# Patient Record
Sex: MALE | Race: BLACK OR AFRICAN AMERICAN | Employment: FULL TIME | URBAN - METROPOLITAN AREA
[De-identification: names, ages, dates, MRNs, and addresses within clinical notes are randomized per-mention and may not be internally consistent; named-entity substitution may affect disease eponyms.]

---

## 2017-02-05 ENCOUNTER — HOSPITAL ENCOUNTER (OUTPATIENT)
Dept: LAB | Age: 67
Discharge: HOME | End: 2017-02-05
Payer: COMMERCIAL

## 2017-02-05 LAB
25(OH)D3 SERPL-MCNC: 28.3 NG/ML (ref 30–100)
ALBUMIN SERPL BCP-MCNC: 4.1 G/DL (ref 3.4–5)
ALBUMIN/GLOB SERPL: 1.3 {RATIO} (ref 0.8–1.7)
ALP SERPL-CCNC: 76 U/L (ref 45–117)
ALT SERPL-CCNC: 25 U/L (ref 16–61)
ANION GAP BLD CALC-SCNC: 10 MMOL/L (ref 3–18)
AST SERPL W P-5'-P-CCNC: 15 U/L (ref 15–37)
BASOPHILS # BLD AUTO: 0 K/UL (ref 0–0.06)
BASOPHILS # BLD: 0 % (ref 0–2)
BILIRUB DIRECT SERPL-MCNC: 0.2 MG/DL (ref 0–0.2)
BILIRUB SERPL-MCNC: 0.6 MG/DL (ref 0.2–1)
BUN SERPL-MCNC: 18 MG/DL (ref 7–18)
BUN/CREAT SERPL: 17 (ref 12–20)
CALCIUM SERPL-MCNC: 9 MG/DL (ref 8.5–10.1)
CHLORIDE SERPL-SCNC: 109 MMOL/L (ref 100–108)
CHOLEST SERPL-MCNC: 104 MG/DL
CO2 SERPL-SCNC: 25 MMOL/L (ref 21–32)
CREAT SERPL-MCNC: 1.08 MG/DL (ref 0.6–1.3)
CREAT UR-MCNC: 233 MG/DL (ref 30–125)
DIFFERENTIAL METHOD BLD: ABNORMAL
EOSINOPHIL # BLD: 0.1 K/UL (ref 0–0.4)
EOSINOPHIL NFR BLD: 2 % (ref 0–5)
ERYTHROCYTE [DISTWIDTH] IN BLOOD BY AUTOMATED COUNT: 13.1 % (ref 11.6–14.5)
GLOBULIN SER CALC-MCNC: 3.1 G/DL (ref 2–4)
GLUCOSE SERPL-MCNC: 102 MG/DL (ref 74–99)
HBA1C MFR BLD: 6.3 % (ref 4.2–5.6)
HCT VFR BLD AUTO: 44.2 % (ref 36–48)
HDLC SERPL-MCNC: 34 MG/DL (ref 40–60)
HDLC SERPL: 3.1 {RATIO} (ref 0–5)
HGB BLD-MCNC: 15.1 G/DL (ref 13–16)
LDLC SERPL CALC-MCNC: 48 MG/DL (ref 0–100)
LIPID PROFILE,FLP: ABNORMAL
LYMPHOCYTES # BLD AUTO: 55 % (ref 21–52)
LYMPHOCYTES # BLD: 3 K/UL (ref 0.9–3.6)
MCH RBC QN AUTO: 31.7 PG (ref 24–34)
MCHC RBC AUTO-ENTMCNC: 34.2 G/DL (ref 31–37)
MCV RBC AUTO: 92.9 FL (ref 74–97)
MICROALBUMIN UR-MCNC: 1.2 MG/DL (ref 0–3)
MICROALBUMIN/CREAT UR-RTO: 5 MG/G (ref 0–30)
MONOCYTES # BLD: 0.4 K/UL (ref 0.05–1.2)
MONOCYTES NFR BLD AUTO: 6 % (ref 3–10)
NEUTS SEG # BLD: 2 K/UL (ref 1.8–8)
NEUTS SEG NFR BLD AUTO: 37 % (ref 40–73)
PLATELET # BLD AUTO: 122 K/UL (ref 135–420)
PMV BLD AUTO: 10.9 FL (ref 9.2–11.8)
POTASSIUM SERPL-SCNC: 4 MMOL/L (ref 3.5–5.5)
PROT SERPL-MCNC: 7.2 G/DL (ref 6.4–8.2)
RBC # BLD AUTO: 4.76 M/UL (ref 4.7–5.5)
SODIUM SERPL-SCNC: 144 MMOL/L (ref 136–145)
T4 FREE SERPL-MCNC: 1 NG/DL (ref 0.7–1.5)
TRIGL SERPL-MCNC: 110 MG/DL (ref ?–150)
TSH SERPL DL<=0.05 MIU/L-ACNC: 1.17 UIU/ML (ref 0.36–3.74)
VLDLC SERPL CALC-MCNC: 22 MG/DL
WBC # BLD AUTO: 5.5 K/UL (ref 4.6–13.2)

## 2017-02-05 PROCEDURE — 82306 VITAMIN D 25 HYDROXY: CPT | Performed by: INTERNAL MEDICINE

## 2017-02-05 PROCEDURE — 82043 UR ALBUMIN QUANTITATIVE: CPT | Performed by: INTERNAL MEDICINE

## 2017-02-05 PROCEDURE — 80061 LIPID PANEL: CPT | Performed by: INTERNAL MEDICINE

## 2017-02-05 PROCEDURE — 84439 ASSAY OF FREE THYROXINE: CPT | Performed by: INTERNAL MEDICINE

## 2017-02-05 PROCEDURE — 36415 COLL VENOUS BLD VENIPUNCTURE: CPT | Performed by: INTERNAL MEDICINE

## 2017-02-05 PROCEDURE — 85025 COMPLETE CBC W/AUTO DIFF WBC: CPT | Performed by: INTERNAL MEDICINE

## 2017-02-05 PROCEDURE — 83036 HEMOGLOBIN GLYCOSYLATED A1C: CPT | Performed by: INTERNAL MEDICINE

## 2017-02-05 PROCEDURE — 80048 BASIC METABOLIC PNL TOTAL CA: CPT | Performed by: INTERNAL MEDICINE

## 2017-02-05 PROCEDURE — 80076 HEPATIC FUNCTION PANEL: CPT | Performed by: INTERNAL MEDICINE

## 2017-02-05 PROCEDURE — 84443 ASSAY THYROID STIM HORMONE: CPT | Performed by: INTERNAL MEDICINE

## 2017-02-08 ENCOUNTER — OFFICE VISIT (OUTPATIENT)
Dept: FAMILY MEDICINE CLINIC | Age: 67
End: 2017-02-08

## 2017-02-08 VITALS
BODY MASS INDEX: 29.85 KG/M2 | OXYGEN SATURATION: 99 % | DIASTOLIC BLOOD PRESSURE: 76 MMHG | HEART RATE: 74 BPM | HEIGHT: 65 IN | SYSTOLIC BLOOD PRESSURE: 130 MMHG | RESPIRATION RATE: 20 BRPM | WEIGHT: 179.2 LBS | TEMPERATURE: 98.1 F

## 2017-02-08 DIAGNOSIS — Z12.5 PROSTATE CANCER SCREENING: ICD-10-CM

## 2017-02-08 DIAGNOSIS — E11.9 TYPE 2 DIABETES MELLITUS WITHOUT COMPLICATION, WITHOUT LONG-TERM CURRENT USE OF INSULIN (HCC): ICD-10-CM

## 2017-02-08 DIAGNOSIS — E78.00 PURE HYPERCHOLESTEROLEMIA: ICD-10-CM

## 2017-02-08 DIAGNOSIS — E55.9 VITAMIN D DEFICIENCY: ICD-10-CM

## 2017-02-08 DIAGNOSIS — Z00.00 ANNUAL PHYSICAL EXAM: Primary | ICD-10-CM

## 2017-02-08 NOTE — PROGRESS NOTES
SUBJECTIVE:   Cecilia Gonzalez is a 61 y.o. male presenting for his annual checkup. Still has a click in the LT ear that he gets when he has to pull on the ear lobe. Has an occasional \"soreness\" but follows it with \"it is really not that bad\", along the center on the RT sole of foot. Does not affect his walking or ability to do anything. Past Medical History   Diagnosis Date    CAD (coronary artery disease)     Constipation     Diabetes mellitus ([address] 8/7/2015    Diarrhea     Gastric ulcer 12/16/2014     On EGD in 2014.  Hiatal hernia 12/16/2014     On EGD, 2014. With Dr. Massiel Mascorro.  History of Helicobacter pylori infection 12/16/2014     On EGD in 2014, Dr. Massiel Mascorro. S/p Tx.  Hyperlipidemia     Impotence of organic origin     Lactose intolerance     Low serum testosterone level     Nocturia     Other testicular hypofunction     Sinus congestion     Stented coronary artery 09/2013.  Vitamin D deficiency        Allergies: Amaryl [glimepiride]     Current Outpatient Prescriptions   Medication Sig    simvastatin (ZOCOR) 20 mg tablet take 1 tablet by mouth NIGHTLY    ONETOUCH ULTRA TEST strip TEST once daily    metFORMIN (GLUCOPHAGE) 500 mg tablet take 1 tablet by mouth twice a day with meals    ergocalciferol (ERGOCALCIFEROL) 50,000 unit capsule     Lancets (ONETOUCH ULTRASOFT LANCETS) misc For once daily testing    nitroglycerin (NITROSTAT) 0.4 mg SL tablet by SubLINGual route every five (5) minutes as needed for Chest Pain.  aspirin 81 mg tablet Take 81 mg by mouth daily.  sildenafil citrate (VIAGRA) 100 mg tablet Take 1 Tab by mouth daily as needed for up to 4 doses. No current facility-administered medications for this visit. ROS:  Feeling well. No dyspnea or chest pain on exertion. No abdominal pain, change in bowel habits, black or bloody stools. No urinary tract or prostatic symptoms. No neurological complaints.       OBJECTIVE:   The patient appears well, alert, oriented x 3, in no distress. Visit Vitals    /76 (BP 1 Location: Left arm, BP Patient Position: Sitting)    Pulse 74    Temp 98.1 °F (36.7 °C) (Oral)    Resp 20    Ht 5' 5\" (1.651 m)    Wt 179 lb 3.2 oz (81.3 kg)    SpO2 99%    BMI 29.82 kg/m2       General appearance: alert, cooperative, no distress, appears stated age  Head: Normocephalic, without obvious abnormality, atraumatic  Ears: normal TM's and external ear canals AU  Throat: Lips, mucosa, and tongue normal. Teeth and gums normal  Neck: supple, symmetrical, trachea midline, no adenopathy, thyroid: not enlarged, symmetric, no tenderness/mass/nodules, no carotid bruit and no JVD  Back: symmetric, no curvature. ROM normal. No CVA tenderness. Lungs: clear to auscultation bilaterally  Heart: regular rate and rhythm, S1, S2 normal, no murmur, click, rub or gallop  Abdomen: soft, non-tender. Bowel sounds normal. No masses,  no organomegaly  Extremities: extremities normal, atraumatic, no cyanosis or edema  Pulses: 2+ and symmetric  Skin: Skin color, texture, turgor normal. No rashes or lesions  Neurological is normal, no focal findings.     Diabetic foot exam:     Left:    Filament test normal sensation with micro filament   Pulse DP: 2+ (normal)   Pulse PT: 2+ (normal)   Deformities: None  Right:    Filament test normal sensation with micro filament   Pulse DP: 2+ (normal)   Pulse PT: 2+ (normal)   Deformities: None        Lab Results   Component Value Date/Time    WBC 5.5 02/05/2017 08:37 AM    HGB 15.1 02/05/2017 08:37 AM    HCT 44.2 02/05/2017 08:37 AM    PLATELET 972 94/32/4886 08:37 AM    MCV 92.9 02/05/2017 08:37 AM         Lab Results   Component Value Date/Time    Sodium 144 02/05/2017 08:37 AM    Potassium 4.0 02/05/2017 08:37 AM    Chloride 109 02/05/2017 08:37 AM    CO2 25 02/05/2017 08:37 AM    Anion gap 10 02/05/2017 08:37 AM    Glucose 102 02/05/2017 08:37 AM    BUN 18 02/05/2017 08:37 AM    Creatinine 1.08 02/05/2017 08:37 AM    BUN/Creatinine ratio 17 02/05/2017 08:37 AM    GFR est AA >60 02/05/2017 08:37 AM    GFR est non-AA >60 02/05/2017 08:37 AM    Calcium 9.0 02/05/2017 08:37 AM         Lab Results   Component Value Date/Time    ALT (SGPT) 25 02/05/2017 08:37 AM    AST (SGOT) 15 02/05/2017 08:37 AM    Alk. phosphatase 76 02/05/2017 08:37 AM    Bilirubin, direct 0.2 02/05/2017 08:37 AM    Bilirubin, total 0.6 02/05/2017 08:37 AM         Lab Results   Component Value Date/Time    Cholesterol, total 104 02/05/2017 08:37 AM    HDL Cholesterol 34 02/05/2017 08:37 AM    LDL, calculated 48 02/05/2017 08:37 AM    VLDL, calculated 22 02/05/2017 08:37 AM    Triglyceride 110 02/05/2017 08:37 AM    CHOL/HDL Ratio 3.1 02/05/2017 08:37 AM         Lab Results   Component Value Date/Time    TSH 1.17 02/05/2017 08:37 AM    T4, Free 1.0 02/05/2017 08:37 AM         Lab Results   Component Value Date/Time    Vitamin D 25-Hydroxy 28.3 02/05/2017 08:37 AM    VITAMIN D, 25-HYDROXY 31.0 12/06/2014 09:15 AM           ASSESSMENT:   healthy adult male    Natalio Hayden was seen today for complete physical.    Diagnoses and all orders for this visit:    Annual physical exam    Prostate cancer screening  -     PROSTATE SPECIFIC AG; Future    Type 2 diabetes mellitus without complication, without long-term current use of insulin ([facility] [address]  -     REFERRAL TO OPHTHALMOLOGY  -      DIABETES FOOT EXAM    Pure hypercholesterolemia    Vitamin D deficiency          PLAN:   follow a low fat, low cholesterol diet. Will f/u in 4 mon for DM. The plan was discussed with the patient. The patient verbalized understanding and is in agreement with the plan. All medication potential side effects were discussed with the patient.

## 2017-02-08 NOTE — PATIENT INSTRUCTIONS

## 2017-02-08 NOTE — PROGRESS NOTES
Jaciel Ovalle is a 61 y.o. male  Chief Complaint   Patient presents with    Complete Physical     1. Have you been to the ER, urgent care clinic since your last visit? Hospitalized since your last visit? No    2. Have you seen or consulted any other health care providers outside of the [address] [place] [address] since your last visit? Include any pap smears or colon screening.  No

## 2017-02-08 NOTE — MR AVS SNAPSHOT
Visit Information Date & Time Provider Department Dept. Phone Encounter #  
 2/8/2017  3:00 PM Thalia Xavier, 3 [facility] [phone] [id number] Upcoming Health Maintenance Date Due Hepatitis C Screening 1950 EYE EXAM RETINAL OR DILATED Q1 8/17/2016 FOOT EXAM Q1 2/1/2017 HEMOGLOBIN A1C Q6M 8/5/2017 MICROALBUMIN Q1 2/5/2018 LIPID PANEL Q1 2/5/2018 COLONOSCOPY 1/1/2022 DTaP/Tdap/Td series (2 - Td) 12/16/2024 Allergies as of 2/8/2017  Review Complete On: 2/8/2017 By: Margo Newberry LPN Severity Noted Reaction Type Reactions Amaryl [Glimepiride]  08/11/2015   Side Effect Vertigo Patient reported that this medications made him very dizzy and light headed. It was discontinued by Dr Stanley Guillen Current Immunizations  Reviewed on 2/8/2017 Name Date Tdap 12/16/2014 Reviewed by Thalia Xavier MD on 2/8/2017 at  3:35 PM  
You Were Diagnosed With   
  
 Codes Comments Annual physical exam    -  Primary ICD-10-CM: Z00.00 ICD-9-CM: V70.0 Prostate cancer screening     ICD-10-CM: Z12.5 ICD-9-CM: V76.44 Type 2 diabetes mellitus without complication, without long-term current use of insulin (HCC)     ICD-10-CM: E11.9 ICD-9-CM: 250.00 Pure hypercholesterolemia     ICD-10-CM: E78.00 ICD-9-CM: 272.0 Vitamin D deficiency     ICD-10-CM: E55.9 ICD-9-CM: 268.9 Vitals BP Pulse Temp Resp Height(growth percentile) Weight(growth percentile) 130/76 (BP 1 Location: Left arm, BP Patient Position: Sitting) 74 98.1 °F (36.7 °C) (Oral) 20 5' 5\" (1.651 m) 179 lb 3.2 oz (81.3 kg) SpO2 BMI Smoking Status 99% 29.82 kg/m2 Former Smoker Vitals History BMI and BSA Data Body Mass Index Body Surface Area  
 29.82 kg/m 2 1.93 m 2 Preferred Pharmacy Pharmacy Name Phone [facility] [address] [phone] Your Updated Medication List  
  
   
This list is accurate as of: 2/8/17  3:51 PM.  Always use your most recent med list.  
  
  
  
  
 aspirin 81 mg tablet Take 81 mg by mouth daily. ergocalciferol 50,000 unit capsule Commonly known as:  ERGOCALCIFEROL Lancets Misc Commonly known as:  ONETOUCH ULTRASOFT LANCETS For once daily testing  
  
 metFORMIN 500 mg tablet Commonly known as:  GLUCOPHAGE  
take 1 tablet by mouth twice a day with meals NITROSTAT 0.4 mg SL tablet Generic drug:  nitroglycerin  
by SubLINGual route every five (5) minutes as needed for Chest Pain. ONETOUCH ULTRA TEST strip Generic drug:  glucose blood VI test strips TEST once daily  
  
 sildenafil citrate 100 mg tablet Commonly known as:  VIAGRA Take 1 Tab by mouth daily as needed for up to 4 doses. simvastatin 20 mg tablet Commonly known as:  ZOCOR  
take 1 tablet by mouth NIGHTLY We Performed the Following  DIABETES FOOT EXAM [HM7 Custom] REFERRAL TO OPHTHALMOLOGY [REF57 Custom] Comments:  
 DO NOT SCHEDULE  Get note from Dr. Filiberto Grove To-Do List   
 02/08/2017 Lab:  [facility] (PROSTATIC SPECIFIC AG) Referral Information Referral ID Referred By Referred To  
  
 [id number] Alexia eHrrera Not Available Visits Status Start Date End Date 1 New Request 2/8/17 2/8/18 If your referral has a status of pending review or denied, additional information will be sent to support the outcome of this decision. Patient Instructions Well Visit, Men 48 to 72: Care Instructions Your Care Instructions Physical exams can help you stay healthy. Your doctor has checked your overall health and may have suggested ways to take good care of yourself. He or she also may have recommended tests. At home, you can help prevent illness with healthy eating, regular exercise, and other steps. Follow-up care is a key part of your treatment and safety. Be sure to make and go to all appointments, and call your doctor if you are having problems. It's also a good idea to know your test results and keep a list of the medicines you take. How can you care for yourself at home? · Reach and stay at a healthy weight. This will lower your risk for many problems, such as obesity, diabetes, heart disease, and high blood pressure. · Get at least 30 minutes of exercise on most days of the week. Walking is a good choice. You also may want to do other activities, such as running, swimming, cycling, or playing tennis or team sports. · Do not smoke. Smoking can make health problems worse. If you need help quitting, talk to your doctor about stop-smoking programs and medicines. These can increase your chances of quitting for good. · Protect your skin from too much sun. When you're outdoors from 10 a.m. to 4 p.m., stay in the shade or cover up with clothing and a hat with a wide brim. Wear sunglasses that block UV rays. Even when it's cloudy, put broad-spectrum sunscreen (SPF 30 or higher) on any exposed skin. · See a dentist one or two times a year for checkups and to have your teeth cleaned. · Wear a seat belt in the car. · Limit alcohol to 2 drinks a day. Too much alcohol can cause health problems. Follow your doctor's advice about when to have certain tests. These tests can spot problems early. · Cholesterol. Your doctor will tell you how often to have this done based on your overall health and other things that can increase your risk for heart attack and stroke. · Blood pressure. Have your blood pressure checked during a routine doctor visit. Your doctor will tell you how often to check your blood pressure based on your age, your blood pressure results, and other factors.  
· Prostate exam. Talk to your doctor about whether you should have a blood test (called a PSA test) for prostate cancer. Experts disagree on whether men should have this test. Some experts recommend that you discuss the benefits and risks of the test with your doctor. · Diabetes. Ask your doctor whether you should have tests for diabetes. · Vision. Some experts recommend that you have yearly exams for glaucoma and other age-related eye problems starting at age 48. · Hearing. Tell your doctor if you notice any change in your hearing. You can have tests to find out how well you hear. · Colon cancer. You should begin tests for colon cancer at age 48. You may have one of several tests. Your doctor will tell you how often to have tests based on your age and risk. Risks include whether you already had a precancerous polyp removed from your colon or whether your parent, brother, sister, or child has had colon cancer. · Heart attack and stroke risk. At least every 4 to 6 years, you should have your risk for heart attack and stroke assessed. Your doctor uses factors such as your age, blood pressure, cholesterol, and whether you smoke or have diabetes to show what your risk for a heart attack or stroke is over the next 10 years. · Abdominal aortic aneurysm. Ask your doctor whether you should have a test to check for an aneurysm. You may need a test if you ever smoked or if your parent, brother, sister, or child has had an aneurysm. When should you call for help? Watch closely for changes in your health, and be sure to contact your doctor if you have any problems or symptoms that concern you. Where can you learn more? Go to http://indio-kavita.info/. Enter G897 in the search box to learn more about \"Well Visit, Men 48 to 72: Care Instructions. \" Current as of: July 19, 2016 Content Version: 11.1 © 5888-2383 [employer].  Care instructions adapted under license by [employer] (which disclaims liability or warranty for this information). If you have questions about a medical condition or this instruction, always ask your healthcare professional. [address] any warranty or liability for your use of this information. Introducing \[facility] [place] [facility]\"" & HEALTH SERVICES! Dear Bruce Vanegas: Thank you for requesting a [employer] account. Our records indicate that you already have an active [employer] account. You can access your account anytime at [email or web address] Did you know that you can access your hospital and ER discharge instructions at any time in In[employer]ient? You can also review all of your test results from your hospital stay or ER visit. Additional Information If you have questions, please visit the Frequently Asked Questions section of the In[employer] website at [email or web address] Remember, [employer] is NOT to be used for urgent needs. For medical emergencies, dial 911. Now available from your iPhone and Android! Please provide this summary of care documentation to your next provider. Your primary care clinician is listed as [address]. If you have any questions after today's visit, please call [phone].

## 2017-03-30 ENCOUNTER — OFFICE VISIT (OUTPATIENT)
Dept: FAMILY MEDICINE CLINIC | Age: 67
End: 2017-03-30

## 2017-03-30 ENCOUNTER — HOSPITAL ENCOUNTER (OUTPATIENT)
Dept: LAB | Age: 67
Discharge: HOME | End: 2017-03-30
Payer: COMMERCIAL

## 2017-03-30 VITALS
HEIGHT: 65 IN | DIASTOLIC BLOOD PRESSURE: 82 MMHG | BODY MASS INDEX: 29.99 KG/M2 | RESPIRATION RATE: 18 BRPM | HEART RATE: 73 BPM | OXYGEN SATURATION: 97 % | TEMPERATURE: 97.7 F | WEIGHT: 180 LBS | SYSTOLIC BLOOD PRESSURE: 128 MMHG

## 2017-03-30 DIAGNOSIS — J06.9 UPPER RESPIRATORY TRACT INFECTION, UNSPECIFIED TYPE: Primary | ICD-10-CM

## 2017-03-30 DIAGNOSIS — J10.1 INFLUENZA B: ICD-10-CM

## 2017-03-30 DIAGNOSIS — J02.9 SORE THROAT: ICD-10-CM

## 2017-03-30 LAB
FLUAV+FLUBV AG NOSE QL IA.RAPID: NEGATIVE POS/NEG
FLUAV+FLUBV AG NOSE QL IA.RAPID: POSITIVE POS/NEG
PSA SERPL-MCNC: 0.3 NG/ML (ref 0–4)
VALID INTERNAL CONTROL?: YES

## 2017-03-30 PROCEDURE — 84153 ASSAY OF PSA TOTAL: CPT | Performed by: INTERNAL MEDICINE

## 2017-03-30 PROCEDURE — 36415 COLL VENOUS BLD VENIPUNCTURE: CPT | Performed by: INTERNAL MEDICINE

## 2017-03-30 RX ORDER — OSELTAMIVIR PHOSPHATE 75 MG/1
75 CAPSULE ORAL 2 TIMES DAILY
Qty: 10 CAP | Refills: 0 | Status: SHIPPED | OUTPATIENT
Start: 2017-03-30 | End: 2017-04-04

## 2017-03-30 NOTE — PATIENT INSTRUCTIONS
Influenza (Flu): Care Instructions  Your Care Instructions  Influenza (flu) is an infection in the lungs and breathing passages. It is caused by the influenza virus. There are different strains, or types, of the flu virus from year to year. Unlike the common cold, the flu comes on suddenly and the symptoms, such as a cough, congestion, fever, chills, fatigue, aches, and pains, are more severe. These symptoms may last up to 10 days. Although the flu can make you feel very sick, it usually doesn't cause serious health problems. Home treatment is usually all you need for flu symptoms. But your doctor may prescribe antiviral medicine to prevent other health problems, such as pneumonia, from developing. Older people and those who have a long-term health condition, such as lung disease, are most at risk for having pneumonia or other health problems. Follow-up care is a key part of your treatment and safety. Be sure to make and go to all appointments, and call your doctor if you are having problems. Its also a good idea to know your test results and keep a list of the medicines you take. How can you care for yourself at home? · Get plenty of rest.  · Drink plenty of fluids, enough so that your urine is light yellow or clear like water. If you have kidney, heart, or liver disease and have to limit fluids, talk with your doctor before you increase the amount of fluids you drink. · Take an over-the-counter pain medicine if needed, such as acetaminophen (Tylenol), ibuprofen (Advil, Motrin), or naproxen (Aleve), to relieve fever, headache, and muscle aches. Read and follow all instructions on the label. No one younger than 20 should take aspirin. It has been linked to Reye syndrome, a serious illness. · Do not smoke. Smoking can make the flu worse. If you need help quitting, talk to your doctor about stop-smoking programs and medicines. These can increase your chances of quitting for good.   · Breathe moist air from a hot shower or from a sink filled with hot water to help clear a stuffy nose. · Before you use cough and cold medicines, check the label. These medicines may not be safe for young children or for people with certain health problems. · If the skin around your nose and lips becomes sore, put some petroleum jelly on the area. · To ease coughing:  ¨ Drink fluids to soothe a scratchy throat. ¨ Suck on cough drops or plain hard candy. ¨ Take an over-the-counter cough medicine that contains dextromethorphan to help you get some sleep. Read and follow all instructions on the label. ¨ Raise your head at night with an extra pillow. This may help you rest if coughing keeps you awake. · Take any prescribed medicine exactly as directed. Call your doctor if you think you are having a problem with your medicine. To avoid spreading the flu  · Wash your hands regularly, and keep your hands away from your face. · Stay home from school, work, and other public places until you are feeling better and your fever has been gone for at least 24 hours. The fever needs to have gone away on its own without the help of medicine. · Ask people living with you to talk to their doctors about preventing the flu. They may get antiviral medicine to keep from getting the flu from you. · To prevent the flu in the future, get a flu vaccine every fall. Encourage people living with you to get the vaccine. · Cover your mouth when you cough or sneeze. When should you call for help? Call 911 anytime you think you may need emergency care. For example, call if:  · You have severe trouble breathing. Call your doctor now or seek immediate medical care if:  · You have new or worse trouble breathing. · You seem to be getting much sicker. · You feel very sleepy or confused. · You have a new or higher fever. · You get a new rash.   Watch closely for changes in your health, and be sure to contact your doctor if:  · You begin to get better and then get worse. · You are not getting better after 1 week. Where can you learn more? Go to http://indio-kavita.info/. Enter W509 in the search box to learn more about \"Influenza (Flu): Care Instructions. \"  Current as of: May 23, 2016  Content Version: 11.2  © 7100-1197 [employer]. Care instructions adapted under license by [employer] (which disclaims liability or warranty for this information). If you have questions about a medical condition or this instruction, always ask your healthcare professional. [address] any warranty or liability for your use of this information.

## 2017-03-30 NOTE — PROGRESS NOTES
Paula Calderon is a 61 y.o. male here today with complaints of cough and congestion        1. Have you been to the ER, urgent care clinic since your last visit? Hospitalized since your last visit? Yes Reason for visit: patient first 3/23/17 cold    2. Have you seen or consulted any other health care providers outside of the [address] [place] [address] since your last visit? Include any pap smears or colon screening.  No

## 2017-03-30 NOTE — PROGRESS NOTES
Assessment/Plan:    Bridget Pierson was seen today for cough. Diagnoses and all orders for this visit:    Upper respiratory tract infection, unspecified type    Sore throat  -     AMB POC KALPESH INFLUENZA A/B TEST    Influenza B  -     oseltamivir (TAMIFLU) 75 mg capsule; Take 1 Cap by mouth two (2) times a day for 5 days. The plan was discussed with the patient. The patient verbalized understanding and is in agreement with the plan. All medication potential side effects were discussed with the patient.    -------------------------------------------------------------------------------------------------------------------        Skip Soulier is a 61 y.o. male and presents with Cough         Subjective:  Pt here for > 1 week of cough, sore throat, sinus congestion, feeling run down. He went to [facility] ago, received what he believes was Amoxicillin for sinusitis. He has not felt any improvement on the meds. ROS:  Constitutional: No recent weight change. No weakness/fatigue. No f/c. Skin: No rashes, change in nails/hair, itching   HENT: No HA, dizziness. No hearing loss/tinnitus. No nasal congestion/discharge. Eyes: No change in vision, double/blurred vision or eye pain/redness. Cardiovascular: No CP/palpitations. No MOE/orthopnea/PND. Respiratory: No cough/sputum, dyspnea, wheezing. Gastointestinal: No dysphagia, reflux. No n/v. No constipation/diarrhea. No melena/rectal bleeding. Genitourinary: No dysuria, urinary hesitancy, nocturia, hematuria. No incontinence. Musculoskeletal: No joint pain/stiffness. No muscle pain/tenderness. Endo: No heat/cold intolerance, no polyuria/polydypsia. Heme: No h/o anemia. No easy bleeding/bruising. Allergy/Immunology: No seasonal rhinitis. Denies frequent colds, sinus/ear infections. Neurological: No seizures/numbness/weakness. No paresthesias. Psychiatric:  No depression, anxiety.      The problem list was updated as a part of today's visit. Patient Active Problem List   Diagnosis Code    Hyperlipidemia E78.5    Lactose intolerance E73.9    Sinus congestion R09.81    CAD (coronary artery disease) I25.10    Vitamin D deficiency E55.9    Low serum testosterone level E29.1    Hiatal hernia K44.9    History of Helicobacter pylori infection Z86.19    Gastric ulcer K25.9    Impotence of organic origin N52.9    Diabetes mellitus ([address] E11.9       The PSH, FH were reviewed. SH:  Social History   Substance Use Topics    Smoking status: Former Smoker     Packs/day: 1.00    Smokeless tobacco: Current User    Alcohol use Yes      Comment: occasionally       Medications/Allergies:  Current Outpatient Prescriptions on File Prior to Visit   Medication Sig Dispense Refill    sildenafil citrate (VIAGRA) 100 mg tablet Take 1 Tab by mouth daily as needed for up to 4 doses. 6 Tab 12    simvastatin (ZOCOR) 20 mg tablet take 1 tablet by mouth NIGHTLY 90 Tab 2    ONETOUCH ULTRA TEST strip TEST once daily 100 Strip 2    metFORMIN (GLUCOPHAGE) 500 mg tablet take 1 tablet by mouth twice a day with meals 180 Tab 2    Lancets (ONETOUCH ULTRASOFT LANCETS) misc For once daily testing 1 Each 5    nitroglycerin (NITROSTAT) 0.4 mg SL tablet by SubLINGual route every five (5) minutes as needed for Chest Pain.  aspirin 81 mg tablet Take 81 mg by mouth daily.  ergocalciferol (ERGOCALCIFEROL) 50,000 unit capsule   0     No current facility-administered medications on file prior to visit. Allergies   Allergen Reactions    Amaryl [Glimepiride] Vertigo     Patient reported that this medications made him very dizzy and light headed.  It was discontinued by Dr Tere Calabrese Maintenance:   Health Maintenance   Topic Date Due    Hepatitis C Screening  1950    HEMOGLOBIN A1C Q6M  08/05/2017    EYE EXAM RETINAL OR DILATED Q1  08/24/2017    MICROALBUMIN Q1  02/05/2018    LIPID PANEL Q1  02/05/2018    FOOT EXAM Q1  02/08/2018    COLONOSCOPY  03/22/2022    DTaP/Tdap/Td series (2 - Td) 12/16/2024    ZOSTER VACCINE AGE 60>  Addressed    Pneumococcal 19-64 Medium Risk  Addressed    INFLUENZA AGE 9 TO ADULT  Addressed       Objective:  Visit Vitals    /82    Pulse 73    Temp 97.7 °F (36.5 °C)    Resp 18    Ht 5' 5\" (1.651 m)    Wt 180 lb (81.6 kg)    SpO2 97%    BMI 29.95 kg/m2          Nurses notes and VS reviewed. Physical Examination: General appearance - ill-appearing  Mouth - erythematous  Neck - supple, no significant adenopathy  Chest - clear to auscultation, no wheezes, rales or rhonchi, symmetric air entry  Heart - normal rate, regular rhythm, normal S1, S2, no murmurs, rubs, clicks or gallops        Labwork and Ancillary Studies:    CBC w/Diff  Lab Results   Component Value Date/Time    WBC 5.5 02/05/2017 08:37 AM    HGB 15.1 02/05/2017 08:37 AM    PLATELET 638 59/93/5905 08:37 AM         Basic Metabolic Profile  Lab Results   Component Value Date/Time    Sodium 144 02/05/2017 08:37 AM    Potassium 4.0 02/05/2017 08:37 AM    Chloride 109 02/05/2017 08:37 AM    CO2 25 02/05/2017 08:37 AM    Anion gap 10 02/05/2017 08:37 AM    Glucose 102 02/05/2017 08:37 AM    BUN 18 02/05/2017 08:37 AM    Creatinine 1.08 02/05/2017 08:37 AM    BUN/Creatinine ratio 17 02/05/2017 08:37 AM    GFR est AA >60 02/05/2017 08:37 AM    GFR est non-AA >60 02/05/2017 08:37 AM    Calcium 9.0 02/05/2017 08:37 AM         LFT  Lab Results   Component Value Date/Time    ALT (SGPT) 25 02/05/2017 08:37 AM    AST (SGOT) 15 02/05/2017 08:37 AM    Alk.  phosphatase 76 02/05/2017 08:37 AM    Bilirubin, direct 0.2 02/05/2017 08:37 AM    Bilirubin, total 0.6 02/05/2017 08:37 AM         Cholesterol  Lab Results   Component Value Date/Time    Cholesterol, total 104 02/05/2017 08:37 AM    HDL Cholesterol 34 02/05/2017 08:37 AM    LDL, calculated 48 02/05/2017 08:37 AM    Triglyceride 110 02/05/2017 08:37 AM    CHOL/HDL Ratio 3.1 02/05/2017 08:37 AM

## 2017-06-03 LAB
CREATININE, EXTERNAL: 0.97
LDL-C, EXTERNAL: 54

## 2017-06-04 RX ORDER — METFORMIN HYDROCHLORIDE 500 MG/1
TABLET ORAL
Qty: 180 TAB | Refills: 1 | Status: SHIPPED | OUTPATIENT
Start: 2017-06-04 | End: 2017-11-25 | Stop reason: SDUPTHER

## 2017-06-13 ENCOUNTER — OFFICE VISIT (OUTPATIENT)
Dept: FAMILY MEDICINE CLINIC | Age: 67
End: 2017-06-13

## 2017-06-13 VITALS
HEIGHT: 65 IN | OXYGEN SATURATION: 95 % | DIASTOLIC BLOOD PRESSURE: 90 MMHG | WEIGHT: 181 LBS | TEMPERATURE: 97.9 F | RESPIRATION RATE: 16 BRPM | BODY MASS INDEX: 30.16 KG/M2 | SYSTOLIC BLOOD PRESSURE: 124 MMHG | HEART RATE: 65 BPM

## 2017-06-13 DIAGNOSIS — R03.0 ELEVATED BLOOD PRESSURE READING: ICD-10-CM

## 2017-06-13 DIAGNOSIS — E11.9 TYPE 2 DIABETES MELLITUS WITHOUT COMPLICATION, WITHOUT LONG-TERM CURRENT USE OF INSULIN (HCC): Primary | ICD-10-CM

## 2017-06-13 DIAGNOSIS — E78.00 PURE HYPERCHOLESTEROLEMIA: ICD-10-CM

## 2017-06-13 DIAGNOSIS — E11.9 TYPE 2 DIABETES MELLITUS WITHOUT COMPLICATION, WITHOUT LONG-TERM CURRENT USE OF INSULIN (HCC): ICD-10-CM

## 2017-06-13 LAB — HBA1C MFR BLD HPLC: 6.2 %

## 2017-06-13 NOTE — PATIENT INSTRUCTIONS

## 2017-06-13 NOTE — PROGRESS NOTES
Assessment/Plan:    Kleber Hicks was seen today for diabetes. Diagnoses and all orders for this visit:    Type 2 diabetes mellitus without complication, without long-term current use of insulin ([facility])  -     AMB POC HEMOGLOBIN A1C  -     HEMOGLOBIN A1C W/O EAG; Future    Pure hypercholesterolemia  -     LIPID PANEL; Future    Elevated blood pressure reading        Pt will f/u 4 mon. Repeat labs. The plan was discussed with the patient. The patient verbalized understanding and is in agreement with the plan. All medication potential side effects were discussed with the patient.    -------------------------------------------------------------------------------------------------------------------        Darcy Sweeney is a 61 y.o. male and presents with Diabetes         Subjective:  DM: well controlled. A1c in office 6.2%. HLD: TGs are elevated on recent BW he did in June for his employment physical.   His TGs were well controlled when I saw him in Feb 2017. He admits that he has not been following his diet as expected. His BP is also not at goal, like it usually is. Does not have a diagnosis of HTN     ROS:  Constitutional: No recent weight change. No weakness/fatigue. No f/c. Skin: No rashes, change in nails/hair, itching   HENT: No HA, dizziness. No hearing loss/tinnitus. No nasal congestion/discharge. Eyes: No change in vision, double/blurred vision or eye pain/redness. Cardiovascular: No CP/palpitations. No MOE/orthopnea/PND. Respiratory: No cough/sputum, dyspnea, wheezing. Gastointestinal: No dysphagia, reflux. No n/v. No constipation/diarrhea. No melena/rectal bleeding. Genitourinary: No dysuria, urinary hesitancy, nocturia, hematuria. No incontinence. Musculoskeletal: No joint pain/stiffness. No muscle pain/tenderness. Endo: No heat/cold intolerance, no polyuria/polydypsia. Heme: No h/o anemia. No easy bleeding/bruising. Allergy/Immunology: No seasonal rhinitis.  Denies frequent colds, sinus/ear infections. Neurological: No seizures/numbness/weakness. No paresthesias. Psychiatric:  No depression, anxiety. The problem list was updated as a part of today's visit. Patient Active Problem List   Diagnosis Code    Hyperlipidemia E78.5    Lactose intolerance E73.9    Sinus congestion R09.81    CAD (coronary artery disease) I25.10    Vitamin D deficiency E55.9    Low serum testosterone level E29.1    Hiatal hernia K44.9    History of Helicobacter pylori infection Z86.19    Gastric ulcer K25.9    Impotence of organic origin N52.9    Diabetes mellitus ([facility] [address] E11.9       The PSH, FH were reviewed. SH:  Social History   Substance Use Topics    Smoking status: Former Smoker     Packs/day: 1.00    Smokeless tobacco: Current User    Alcohol use Yes      Comment: occasionally       Medications/Allergies:  Current Outpatient Prescriptions on File Prior to Visit   Medication Sig Dispense Refill    metFORMIN (GLUCOPHAGE) 500 mg tablet take 1 tablet by mouth twice a day with meals 180 Tab 1    sildenafil citrate (VIAGRA) 100 mg tablet Take 1 Tab by mouth daily as needed for up to 4 doses. 6 Tab 12    simvastatin (ZOCOR) 20 mg tablet take 1 tablet by mouth NIGHTLY 90 Tab 2    ONETOUCH ULTRA TEST strip TEST once daily 100 Strip 2    Lancets (ONETOUCH ULTRASOFT LANCETS) misc For once daily testing 1 Each 5    nitroglycerin (NITROSTAT) 0.4 mg SL tablet by SubLINGual route every five (5) minutes as needed for Chest Pain.  aspirin 81 mg tablet Take 81 mg by mouth daily. No current facility-administered medications on file prior to visit. Allergies   Allergen Reactions    Amaryl [Glimepiride] Vertigo     Patient reported that this medications made him very dizzy and light headed.  It was discontinued by Dr Susu Giraldo Maintenance:   Health Maintenance   Topic Date Due    INFLUENZA AGE 9 TO ADULT  08/01/2017    HEMOGLOBIN A1C Q6M  08/05/2017  EYE EXAM RETINAL OR DILATED Q1  08/24/2017    MICROALBUMIN Q1  02/05/2018    LIPID PANEL Q1  02/05/2018    FOOT EXAM Q1  02/08/2018    COLONOSCOPY  03/22/2022    DTaP/Tdap/Td series (2 - Td) 12/16/2024    Hepatitis C Screening  Completed    ZOSTER VACCINE AGE 60>  Addressed    Pneumococcal 19-64 Medium Risk  Addressed       Objective:  Visit Vitals    /90 (BP 1 Location: Left arm, BP Patient Position: Sitting)    Pulse 65    Temp 97.9 °F (36.6 °C) (Oral)    Resp 16    Ht 5' 5\" (1.651 m)    Wt 181 lb (82.1 kg)    SpO2 95%    BMI 30.12 kg/m2          Nurses notes and VS reviewed. Physical Examination: General appearance - alert, well appearing, and in no distress  Chest - clear to auscultation, no wheezes, rales or rhonchi, symmetric air entry  Heart - normal rate, regular rhythm, normal S1, S2, no murmurs, rubs, clicks or gallops      Labwork and Ancillary Studies:    CBC w/Diff  Lab Results   Component Value Date/Time    WBC 5.5 02/05/2017 08:37 AM    HGB 15.1 02/05/2017 08:37 AM    PLATELET 646 85/21/0322 08:37 AM         Basic Metabolic Profile  Lab Results   Component Value Date/Time    Sodium 144 02/05/2017 08:37 AM    Potassium 4.0 02/05/2017 08:37 AM    Chloride 109 02/05/2017 08:37 AM    CO2 25 02/05/2017 08:37 AM    Anion gap 10 02/05/2017 08:37 AM    Glucose 102 02/05/2017 08:37 AM    BUN 18 02/05/2017 08:37 AM    Creatinine 1.08 02/05/2017 08:37 AM    BUN/Creatinine ratio 17 02/05/2017 08:37 AM    GFR est AA >60 02/05/2017 08:37 AM    GFR est non-AA >60 02/05/2017 08:37 AM    Calcium 9.0 02/05/2017 08:37 AM         LFT  Lab Results   Component Value Date/Time    ALT (SGPT) 25 02/05/2017 08:37 AM    AST (SGOT) 15 02/05/2017 08:37 AM    Alk.  phosphatase 76 02/05/2017 08:37 AM    Bilirubin, direct 0.2 02/05/2017 08:37 AM    Bilirubin, total 0.6 02/05/2017 08:37 AM         Cholesterol  Lab Results   Component Value Date/Time    Cholesterol, total 104 02/05/2017 08:37 AM    HDL Cholesterol 34 02/05/2017 08:37 AM    LDL, calculated 48 02/05/2017 08:37 AM    Triglyceride 110 02/05/2017 08:37 AM    CHOL/HDL Ratio 3.1 02/05/2017 08:37 AM

## 2017-06-13 NOTE — PROGRESS NOTES
Rhoda Wilson is a 61 y.o. male here today for 4 month follow up     1. Have you been to the ER, urgent care clinic or hospitalized since your last visit? [facility]     2. Have you seen or consulted any other health care providers outside of the [address] [place] [address] since your last visit (Include any pap smears or colon screening)? NO      Do you have an Advanced Directive? NO    Would you like information on Advanced Directives?  NO    Learning Assessment 4/4/2014   PRIMARY LEARNER Patient   HIGHEST LEVEL OF EDUCATION - PRIMARY LEARNER  GRADUATED HIGH SCHOOL OR GED   BARRIERS PRIMARY LEARNER NONE   PRIMARY LANGUAGE ENGLISH   LEARNER PREFERENCE PRIMARY VIDEOS   ANSWERED BY PATIENT   RELATIONSHIP SELF

## 2017-06-13 NOTE — MR AVS SNAPSHOT
Visit Information Date & Time Provider Department Dept. Phone Encounter #  
 6/13/2017  3:15 PM Yamileth Muhammad [phone] [id number] Upcoming Health Maintenance Date Due INFLUENZA AGE 9 TO ADULT 8/1/2017 HEMOGLOBIN A1C Q6M 8/5/2017 EYE EXAM RETINAL OR DILATED Q1 8/24/2017 MICROALBUMIN Q1 2/5/2018 LIPID PANEL Q1 2/5/2018 FOOT EXAM Q1 2/8/2018 COLONOSCOPY 3/22/2022 DTaP/Tdap/Td series (2 - Td) 12/16/2024 Allergies as of 6/13/2017  Review Complete On: 6/13/2017 By: Elizabeth ePdro LPN Severity Noted Reaction Type Reactions Amaryl [Glimepiride]  08/11/2015   Side Effect Vertigo Patient reported that this medications made him very dizzy and light headed. It was discontinued by Dr Raiza Cordova Current Immunizations  Reviewed on 3/30/2017 Name Date Tdap 12/16/2014 Not reviewed this visit You Were Diagnosed With   
  
 Codes Comments Type 2 diabetes mellitus without complication, without long-term current use of insulin (HCC)    -  Primary ICD-10-CM: E11.9 ICD-9-CM: 250.00 Pure hypercholesterolemia     ICD-10-CM: E78.00 ICD-9-CM: 272.0 Vitals BP Pulse Temp Resp Height(growth percentile) Weight(growth percentile) 124/90 (BP 1 Location: Left arm, BP Patient Position: Sitting) 65 97.9 °F (36.6 °C) (Oral) 16 5' 5\" (1.651 m) 181 lb (82.1 kg) SpO2 BMI Smoking Status 95% 30.12 kg/m2 Former Smoker Vitals History BMI and BSA Data Body Mass Index Body Surface Area  
 30.12 kg/m 2 1.94 m 2 Preferred Pharmacy Pharmacy Name Phone [address] [phone] Your Updated Medication List  
  
   
This list is accurate as of: 6/13/17  3:31 PM.  Always use your most recent med list.  
  
  
  
  
 aspirin 81 mg tablet Take 81 mg by mouth daily. Lancets Misc Commonly known as:  ONETOUCH ULTRASOFT LANCETS For once daily testing  
  
 metFORMIN 500 mg tablet Commonly known as:  GLUCOPHAGE  
take 1 tablet by mouth twice a day with meals NITROSTAT 0.4 mg SL tablet Generic drug:  nitroglycerin  
by SubLINGual route every five (5) minutes as needed for Chest Pain. ONETOUCH ULTRA TEST strip Generic drug:  glucose blood VI test strips TEST once daily  
  
 sildenafil citrate 100 mg tablet Commonly known as:  VIAGRA Take 1 Tab by mouth daily as needed for up to 4 doses. simvastatin 20 mg tablet Commonly known as:  ZOCOR  
take 1 tablet by mouth NIGHTLY We Performed the Following AMB POC HEMOGLOBIN A1C [10395 CPT(R)] To-Do List   
 06/13/2017 Lab:  HEMOGLOBIN A1C W/O EAG   
  
 06/13/2017 Lab:  LIPID PANEL Patient Instructions Learning About Diabetes Food Guidelines Your Care Instructions Meal planning is important to manage diabetes. It helps keep your blood sugar at a target level (which you set with your doctor). You don't have to eat special foods. You can eat what your family eats, including sweets once in a while. But you do have to pay attention to how often you eat and how much you eat of certain foods. You may want to work with a dietitian or a certified diabetes educator (CDE) to help you plan meals and snacks. A dietitian or CDE can also help you lose weight if that is one of your goals. What should you know about eating carbs? Managing the amount of carbohydrate (carbs) you eat is an important part of healthy meals when you have diabetes. Carbohydrate is found in many foods. · Learn which foods have carbs. And learn the amounts of carbs in different foods. ¨ Bread, cereal, pasta, and rice have about 15 grams of carbs in a serving. A serving is 1 slice of bread (1 ounce), ½ cup of cooked cereal, or 1/3 cup of cooked pasta or rice. ¨ Fruits have 15 grams of carbs in a serving. A serving is 1 small fresh fruit, such as an apple or orange; ½ of a banana; ½ cup of cooked or canned fruit; ½ cup of fruit juice; 1 cup of melon or raspberries; or 2 tablespoons of dried fruit. ¨ Milk and no-sugar-added yogurt have 15 grams of carbs in a serving. A serving is 1 cup of milk or 2/3 cup of no-sugar-added yogurt. ¨ Starchy vegetables have 15 grams of carbs in a serving. A serving is ½ cup of mashed potatoes or sweet potato; 1 cup winter squash; ½ of a small baked potato; ½ cup of cooked beans; or ½ cup cooked corn or green peas. · Learn how much carbs to eat each day and at each meal. A dietitian or CDE can teach you how to keep track of the amount of carbs you eat. This is called carbohydrate counting. · If you are not sure how to count carbohydrate grams, use the Plate Method to plan meals. It is a good, quick way to make sure that you have a balanced meal. It also helps you spread carbs throughout the day. ¨ Divide your plate by types of foods. Put non-starchy vegetables on half the plate, meat or other protein food on one-quarter of the plate, and a grain or starchy vegetable in the final quarter of the plate. To this you can add a small piece of fruit and 1 cup of milk or yogurt, depending on how many carbs you are supposed to eat at a meal. 
· Try to eat about the same amount of carbs at each meal. Do not \"save up\" your daily allowance of carbs to eat at one meal. 
· Proteins have very little or no carbs per serving. Examples of proteins are beef, chicken, turkey, fish, eggs, tofu, cheese, cottage cheese, and peanut butter. A serving size of meat is 3 ounces, which is about the size of a deck of cards. Examples of meat substitute serving sizes (equal to 1 ounce of meat) are 1/4 cup of cottage cheese, 1 egg, 1 tablespoon of peanut butter, and ½ cup of tofu. How can you eat out and still eat healthy? · Learn to estimate the serving sizes of foods that have carbohydrate. If you measure food at home, it will be easier to estimate the amount in a serving of restaurant food. · If the meal you order has too much carbohydrate (such as potatoes, corn, or baked beans), ask to have a low-carbohydrate food instead. Ask for a salad or green vegetables. · If you use insulin, check your blood sugar before and after eating out to help you plan how much to eat in the future. · If you eat more carbohydrate at a meal than you had planned, take a walk or do other exercise. This will help lower your blood sugar. What else should you know? · Limit saturated fat, such as the fat from meat and dairy products. This is a healthy choice because people who have diabetes are at higher risk of heart disease. So choose lean cuts of meat and nonfat or low-fat dairy products. Use olive or canola oil instead of butter or shortening when cooking. · Don't skip meals. Your blood sugar may drop too low if you skip meals and take insulin or certain medicines for diabetes. · Check with your doctor before you drink alcohol. Alcohol can cause your blood sugar to drop too low. Alcohol can also cause a bad reaction if you take certain diabetes medicines. Follow-up care is a key part of your treatment and safety. Be sure to make and go to all appointments, and call your doctor if you are having problems. It's also a good idea to know your test results and keep a list of the medicines you take. Where can you learn more? Go to http://indio-kavita.info/. Enter G397 in the search box to learn more about \"Learning About Diabetes Food Guidelines. \" Current as of: May 23, 2016 Content Version: 11.2 © 6927-5468 [employer]. Care instructions adapted under license by [employer] (which disclaims liability or warranty for this information).  If you have questions about a medical condition or this instruction, always ask your healthcare professional. Giulia Posey any warranty or liability for your use of this information. Introducing [facility]! Dear Tiffanie Mohan: Thank you for requesting a [employer] account. Our records indicate that you already have an active [employer] account. You can access your account anytime at [email or web address] Did you know that you can access your hospital and ER discharge instructions at any time in [employer]? You can also review all of your test results from your hospital stay or ER visit. Additional Information If you have questions, please visit the Frequently Asked Questions section of the [employer] website at [email or web address] Remember, [employer] is NOT to be used for urgent needs. For medical emergencies, dial 911. Now available from your iPhone and Android! Please provide this summary of care documentation to your next provider. Your primary care clinician is listed as Ronda [address]. If you have any questions after today's visit, please call [phone].

## 2017-06-27 ENCOUNTER — OFFICE VISIT (OUTPATIENT)
Dept: FAMILY MEDICINE CLINIC | Age: 67
End: 2017-06-27

## 2017-06-27 VITALS
BODY MASS INDEX: 30.49 KG/M2 | RESPIRATION RATE: 16 BRPM | HEART RATE: 62 BPM | DIASTOLIC BLOOD PRESSURE: 88 MMHG | TEMPERATURE: 97.8 F | SYSTOLIC BLOOD PRESSURE: 132 MMHG | OXYGEN SATURATION: 97 % | WEIGHT: 183 LBS | HEIGHT: 65 IN

## 2017-06-27 DIAGNOSIS — R09.81 SINUS CONGESTION: ICD-10-CM

## 2017-06-27 DIAGNOSIS — H69.82 EUSTACHIAN TUBE DYSFUNCTION, LEFT: Primary | ICD-10-CM

## 2017-06-27 NOTE — PROGRESS NOTES
Prince Bonilla is a 61 y.o. male here today with complaints of left ear pressure. 1. Have you been to the ER, urgent care clinic since your last visit? Hospitalized since your last visit? No    2. Have you seen or consulted any other health care providers outside of the [address] [place] [address] since your last visit? Include any pap smears or colon screening.  No

## 2017-06-27 NOTE — MR AVS SNAPSHOT
Visit Information Date & Time Provider Department Dept. Phone Encounter #  
 6/27/2017  8:00 AM Simonnigel Karina, 3 [facility] [phone] [id number] Your Appointments 10/11/2017  3:15 PM  
Follow Up with Shana Collins MD  
3 [facility] Merlene June) Appt Note: 4 month f/u  
 [address] [facility] [address] [facility] [id number] [phone]  
  
   
 [address] Upcoming Health Maintenance Date Due INFLUENZA AGE 9 TO ADULT 8/1/2017 HEMOGLOBIN A1C Q6M 12/13/2017 MICROALBUMIN Q1 2/5/2018 FOOT EXAM Q1 2/8/2018 EYE EXAM RETINAL OR DILATED Q1 6/1/2018 LIPID PANEL Q1 6/3/2018 COLONOSCOPY 3/22/2022 DTaP/Tdap/Td series (2 - Td) 12/16/2024 Allergies as of 6/27/2017  Review Complete On: 6/27/2017 By: Shana Collins MD  
  
 Severity Noted Reaction Type Reactions Amaryl [Glimepiride]  08/11/2015   Side Effect Vertigo Patient reported that this medications made him very dizzy and light headed. It was discontinued by Dr Dianelys Phillips Current Immunizations  Reviewed on 3/30/2017 Name Date Tdap 12/16/2014 Not reviewed this visit You Were Diagnosed With   
  
 Codes Comments Eustachian tube dysfunction, left    -  Primary ICD-10-CM: B11.39 ICD-9-CM: 381.81 Sinus congestion     ICD-10-CM: R09.81 ICD-9-CM: 478.19 Vitals BP Pulse Temp Resp Height(growth percentile) Weight(growth percentile) 132/88 62 97.8 °F (36.6 °C) 16 5' 5\" (1.651 m) 183 lb (83 kg) SpO2 BMI Smoking Status 97% 30.45 kg/m2 Former Smoker Vitals History BMI and BSA Data Body Mass Index Body Surface Area  
 30.45 kg/m 2 1.95 m 2 Preferred Pharmacy Pharmacy Name Phone [address]  [address] [phone] Your Updated Medication List  
  
   
 This list is accurate as of: 6/27/17  8:26 AM.  Always use your most recent med list.  
  
  
  
  
 aspirin 81 mg tablet Take 81 mg by mouth daily. Lancets Misc Commonly known as:  ONETOUCH ULTRASOFT LANCETS For once daily testing  
  
 metFORMIN 500 mg tablet Commonly known as:  GLUCOPHAGE  
take 1 tablet by mouth twice a day with meals NITROSTAT 0.4 mg SL tablet Generic drug:  nitroglycerin  
by SubLINGual route every five (5) minutes as needed for Chest Pain. ONETOUCH ULTRA TEST strip Generic drug:  glucose blood VI test strips TEST once daily  
  
 sildenafil citrate 100 mg tablet Commonly known as:  VIAGRA Take 1 Tab by mouth daily as needed for up to 4 doses. simvastatin 20 mg tablet Commonly known as:  ZOCOR  
take 1 tablet by mouth NIGHTLY We Performed the Following REFERRAL TO ENT-OTOLARYNGOLOGY [HKZ20 Custom] Referral Information Referral ID Referred By Referred To  
  
 [id number] [address], [facility] 2018 [facility] [address] Phone: [phone] Fax: [phone] Visits Status Start Date End Date 1 New Request 6/27/17 6/27/18 If your referral has a status of pending review or denied, additional information will be sent to support the outcome of this decision. Introducing [facility] & HEALTH SERVICES! Dear Madhavi Julien: Thank you for requesting a Fashion Project account. Our records indicate that you already have an active Fashion Project account. You can access your account anytime at [email or web address] Did you know that you can access your hospital and ER discharge instructions at any time in Fashion Project? You can also review all of your test results from your hospital stay or ER visit. Additional Information If you have questions, please visit the Frequently Asked Questions section of the CondoDomain website at [email or web address] Remember, CondoDomain is NOT to be used for urgent needs. For medical emergencies, dial 911. Now available from your iPhone and Android! Please provide this summary of care documentation to your next provider. Your primary care clinician is listed as Ronda 51. If you have any questions after today's visit, please call [phone].

## 2017-06-27 NOTE — PROGRESS NOTES
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 Diagnoses and all orders for this visit:    Eustachian tube dysfunction, left  -     REFERRAL TO ENT-OTOLARYNGOLOGY    Sinus congestion  -     REFERRAL TO ENT-OTOLARYNGOLOGY      Will await recommendations. The plan was discussed with the patient. The patient verbalized understanding and is in agreement with the plan. All medication potential side effects were discussed with the patient.    -------------------------------------------------------------------------------------------------------------------        Anson Mac is a 61 y.o. male and presents with Ear Fullness         Subjective:  Pt here for a continuous issue with his LT ear, fullness, pressure. Has been going on for months. Goes back to the time when he had the URI in March 2017. He has periods where he feels fine, like the issue has resolved then it returns. Associated post nasal drainage and sinus congestion. ROS:  Constitutional: No recent weight change. No weakness/fatigue. No f/c. Skin: No rashes, change in nails/hair, itching   HENT: No HA, dizziness. No hearing loss/tinnitus. No nasal congestion/discharge. Eyes: No change in vision, double/blurred vision or eye pain/redness. Cardiovascular: No CP/palpitations. No MOE/orthopnea/PND. Respiratory: No cough/sputum, dyspnea, wheezing. Gastointestinal: No dysphagia, reflux. No n/v. No constipation/diarrhea. No melena/rectal bleeding. Genitourinary: No dysuria, urinary hesitancy, nocturia, hematuria. No incontinence. Musculoskeletal: No joint pain/stiffness. No muscle pain/tenderness. Endo: No heat/cold intolerance, no polyuria/polydypsia. Heme: No h/o anemia. No easy bleeding/bruising. Allergy/Immunology: No seasonal rhinitis. Denies frequent colds, sinus/ear infections. Neurological: No seizures/numbness/weakness. No paresthesias. Psychiatric:  No depression, anxiety.      The problem list was updated as a part of today's visit. Patient Active Problem List   Diagnosis Code    Hyperlipidemia E78.5    Lactose intolerance E73.9    Sinus congestion R09.81    CAD (coronary artery disease) I25.10    Vitamin D deficiency E55.9    Low serum testosterone level E29.1    Hiatal hernia K44.9    History of Helicobacter pylori infection Z86.19    Gastric ulcer K25.9    Impotence of organic origin N52.9    Diabetes mellitus ([address] E11.9       The PSH, FH were reviewed. SH:  Social History   Substance Use Topics    Smoking status: Former Smoker     Packs/day: 1.00    Smokeless tobacco: Current User    Alcohol use Yes      Comment: occasionally       Medications/Allergies:  Current Outpatient Prescriptions on File Prior to Visit   Medication Sig Dispense Refill    metFORMIN (GLUCOPHAGE) 500 mg tablet take 1 tablet by mouth twice a day with meals 180 Tab 1    sildenafil citrate (VIAGRA) 100 mg tablet Take 1 Tab by mouth daily as needed for up to 4 doses. 6 Tab 12    simvastatin (ZOCOR) 20 mg tablet take 1 tablet by mouth NIGHTLY 90 Tab 2    ONETOUCH ULTRA TEST strip TEST once daily 100 Strip 2    Lancets (ONETOUCH ULTRASOFT LANCETS) misc For once daily testing 1 Each 5    nitroglycerin (NITROSTAT) 0.4 mg SL tablet by SubLINGual route every five (5) minutes as needed for Chest Pain.  aspirin 81 mg tablet Take 81 mg by mouth daily. No current facility-administered medications on file prior to visit. Allergies   Allergen Reactions    Amaryl [Glimepiride] Vertigo     Patient reported that this medications made him very dizzy and light headed.  It was discontinued by Dr Shaun Leeivers Maintenance:   Health Maintenance   Topic Date Due    INFLUENZA AGE 9 TO ADULT  08/01/2017    HEMOGLOBIN A1C Q6M  12/13/2017    MICROALBUMIN Q1  02/05/2018    FOOT EXAM Q1  02/08/2018    EYE EXAM RETINAL OR DILATED Q1  06/01/2018    LIPID PANEL Q1  06/03/2018    COLONOSCOPY  03/22/2022  DTaP/Tdap/Td series (2 - Td) 12/16/2024    Hepatitis C Screening  Completed    ZOSTER VACCINE AGE 60>  Addressed    Pneumococcal 19-64 Medium Risk  Addressed       Objective:  Visit Vitals    /88    Pulse 62    Temp 97.8 °F (36.6 °C)    Resp 16    Ht 5' 5\" (1.651 m)    Wt 183 lb (83 kg)    SpO2 97%    BMI 30.45 kg/m2          Nurses notes and VS reviewed. Physical Examination: General appearance - alert, well appearing, and in no distress  Ears - bilateral TM's and external ear canals normal  Mouth - mucous membranes moist, pharynx normal without lesions  Chest - clear to auscultation, no wheezes, rales or rhonchi, symmetric air entry  Heart - normal rate, regular rhythm, normal S1, S2, no murmurs, rubs, clicks or gallops        Labwork and Ancillary Studies:    CBC w/Diff  Lab Results   Component Value Date/Time    WBC 5.5 02/05/2017 08:37 AM    HGB 15.1 02/05/2017 08:37 AM    PLATELET 372 87/01/1658 08:37 AM         Basic Metabolic Profile  Lab Results   Component Value Date/Time    Sodium 144 02/05/2017 08:37 AM    Potassium 4.0 02/05/2017 08:37 AM    Chloride 109 02/05/2017 08:37 AM    CO2 25 02/05/2017 08:37 AM    Anion gap 10 02/05/2017 08:37 AM    Glucose 102 02/05/2017 08:37 AM    BUN 18 02/05/2017 08:37 AM    Creatinine 1.08 02/05/2017 08:37 AM    BUN/Creatinine ratio 17 02/05/2017 08:37 AM    GFR est AA >60 02/05/2017 08:37 AM    GFR est non-AA >60 02/05/2017 08:37 AM    Calcium 9.0 02/05/2017 08:37 AM         LFT  Lab Results   Component Value Date/Time    ALT (SGPT) 25 02/05/2017 08:37 AM    AST (SGOT) 15 02/05/2017 08:37 AM    Alk.  phosphatase 76 02/05/2017 08:37 AM    Bilirubin, direct 0.2 02/05/2017 08:37 AM    Bilirubin, total 0.6 02/05/2017 08:37 AM         Cholesterol  Lab Results   Component Value Date/Time    Cholesterol, total 104 02/05/2017 08:37 AM    HDL Cholesterol 34 02/05/2017 08:37 AM    LDL, calculated 48 02/05/2017 08:37 AM    Triglyceride 110 02/05/2017 08:37 AM    CHOL/HDL Ratio 3.1 02/05/2017 08:37 AM

## 2017-08-23 RX ORDER — SIMVASTATIN 20 MG/1
TABLET, FILM COATED ORAL
Qty: 90 TAB | Refills: 2 | Status: SHIPPED | OUTPATIENT
Start: 2017-08-23 | End: 2018-04-27 | Stop reason: ALTCHOICE

## 2017-10-11 ENCOUNTER — OFFICE VISIT (OUTPATIENT)
Dept: FAMILY MEDICINE CLINIC | Age: 67
End: 2017-10-11

## 2017-10-11 VITALS
TEMPERATURE: 97.6 F | RESPIRATION RATE: 20 BRPM | BODY MASS INDEX: 30.66 KG/M2 | SYSTOLIC BLOOD PRESSURE: 124 MMHG | WEIGHT: 184 LBS | HEIGHT: 65 IN | DIASTOLIC BLOOD PRESSURE: 80 MMHG | HEART RATE: 68 BPM | OXYGEN SATURATION: 96 %

## 2017-10-11 DIAGNOSIS — E78.00 PURE HYPERCHOLESTEROLEMIA: ICD-10-CM

## 2017-10-11 DIAGNOSIS — E11.9 TYPE 2 DIABETES MELLITUS WITHOUT COMPLICATION, WITHOUT LONG-TERM CURRENT USE OF INSULIN (HCC): Primary | ICD-10-CM

## 2017-10-11 NOTE — MR AVS SNAPSHOT
Visit Information Date & Time Provider Department Dept. Phone Encounter #  
 10/11/2017  1:45 PM Kathy Menchaca, [address] [phone] [id number] Upcoming Health Maintenance Date Due INFLUENZA AGE 9 TO ADULT 8/1/2017 Pneumococcal 65+ Low/Medium Risk (1 of 2 - PCV13) 8/29/2017 MEDICARE YEARLY EXAM 8/29/2017 HEMOGLOBIN A1C Q6M 12/13/2017 MICROALBUMIN Q1 2/5/2018 FOOT EXAM Q1 2/8/2018 EYE EXAM RETINAL OR DILATED Q1 6/1/2018 LIPID PANEL Q1 6/3/2018 GLAUCOMA SCREENING Q2Y 6/1/2019 COLONOSCOPY 3/22/2022 DTaP/Tdap/Td series (2 - Td) 12/16/2024 Allergies as of 10/11/2017  Review Complete On: 10/11/2017 By: Sofía Downs LPN Severity Noted Reaction Type Reactions Amaryl [Glimepiride]  08/11/2015   Side Effect Vertigo Patient reported that this medications made him very dizzy and light headed. It was discontinued by Dr Maciel Bell Current Immunizations  Reviewed on 10/11/2017 Name Date Tdap 12/16/2014 Reviewed by Kathy Menchaca MD on 10/11/2017 at  2:30 PM  
You Were Diagnosed With   
  
 Codes Comments Type 2 diabetes mellitus without complication, without long-term current use of insulin (HCC)    -  Primary ICD-10-CM: E11.9 ICD-9-CM: 250.00 Pure hypercholesterolemia     ICD-10-CM: E78.00 ICD-9-CM: 272.0 Vitals BP Pulse Temp Resp Height(growth percentile) Weight(growth percentile) 124/80 (BP 1 Location: Left arm, BP Patient Position: Sitting) 68 97.6 °F (36.4 °C) (Oral) 20 5' 5\" (1.651 m) 184 lb (83.5 kg) SpO2 BMI Smoking Status 96% 30.62 kg/m2 Former Smoker BMI and BSA Data Body Mass Index Body Surface Area  
 30.62 kg/m 2 1.96 m 2 Preferred Pharmacy Pharmacy Name Phone [address]  [address] [phone] Your Updated Medication List  
  
   
 This list is accurate as of: 10/11/17  2:31 PM.  Always use your most recent med list.  
  
  
  
  
 aspirin 81 mg tablet Take 81 mg by mouth daily. Lancets Misc Commonly known as:  ONETOUCH ULTRASOFT LANCETS For once daily testing  
  
 metFORMIN 500 mg tablet Commonly known as:  GLUCOPHAGE  
take 1 tablet by mouth twice a day with meals NITROSTAT 0.4 mg SL tablet Generic drug:  nitroglycerin  
by SubLINGual route every five (5) minutes as needed for Chest Pain. ONETOUCH ULTRA TEST strip Generic drug:  glucose blood VI test strips TEST once daily  
  
 sildenafil citrate 100 mg tablet Commonly known as:  VIAGRA Take 1 Tab by mouth daily as needed for up to 4 doses. simvastatin 20 mg tablet Commonly known as:  ZOCOR  
TAKE ONE TABLET BY MOUTH NIGHTLY To-Do List   
 10/11/2017 Lab:  HEMOGLOBIN A1C W/O EAG   
  
 10/11/2017 Lab:  LIPID PANEL Patient Instructions Learning About Meal Planning for Diabetes Why plan your meals? Meal planning can be a key part of managing diabetes. Planning meals and snacks with the right balance of carbohydrate, protein, and fat can help you keep your blood sugar at the target level you set with your doctor. You don't have to eat special foods. You can eat what your family eats, including sweets once in a while. But you do have to pay attention to how often you eat and how much you eat of certain foods. You may want to work with a dietitian or a certified diabetes educator. He or she can give you tips and meal ideas and can answer your questions about meal planning. This health professional can also help you reach a healthy weight if that is one of your goals. What plan is right for you? Your dietitian or diabetes educator may suggest that you start with the plate format or carbohydrate counting. The plate format The plate format is a simple way to help you manage how you eat.  You plan meals by learning how much space each food should take on a plate. Using the plate format helps you spread carbohydrate throughout the day. It can make it easier to keep your blood sugar level within your target range. It also helps you see if you're eating healthy portion sizes. To use the plate format, you put non-starchy vegetables on half your plate. Add meat or meat substitutes on one-quarter of the plate. Put a grain or starchy vegetable (such as brown rice or a potato) on the final quarter of the plate. You can add a small piece of fruit and some low-fat or fat-free milk or yogurt, depending on your carbohydrate goal for each meal. 
Here are some tips for using the plate format: · Make sure that you are not using an oversized plate. A 9-inch plate is best. Many restaurants use larger plates. · Get used to using the plate format at home. Then you can use it when you eat out. · Write down your questions about using the plate format. Talk to your doctor, a dietitian, or a diabetes educator about your concerns. Carbohydrate counting With carbohydrate counting, you plan meals based on the amount of carbohydrate in each food. Carbohydrate raises blood sugar higher and more quickly than any other nutrient. It is found in desserts, breads and cereals, and fruit. It's also found in starchy vegetables such as potatoes and corn, grains such as rice and pasta, and milk and yogurt. Spreading carbohydrate throughout the day helps keep your blood sugar levels within your target range. Your daily amount depends on several things, including your weight, how active you are, which diabetes medicines you take, and what your goals are for your blood sugar levels. A registered dietitian or diabetes educator can help you plan how much carbohydrate to include in each meal and snack. A guideline for your daily amount of carbohydrate is: · 45 to 60 grams at each meal. That's about the same as 3 to 4 carbohydrate servings. · 15 to 20 grams at each snack. That's about the same as 1 carbohydrate serving. The Nutrition Facts label on packaged foods tells you how much carbohydrate is in a serving of the food. First, look at the serving size on the food label. Is that the amount you eat in a serving? All of the nutrition information on a food label is based on that serving size. So if you eat more or less than that, you'll need to adjust the other numbers. Total carbohydrate is the next thing you need to look for on the label. If you count carbohydrate servings, one serving of carbohydrate is 15 grams. For foods that don't come with labels, such as fresh fruits and vegetables, you'll need a guide that lists carbohydrate in these foods. Ask your doctor, dietitian, or diabetes educator about books or other nutrition guides you can use. If you take insulin, you need to know how many grams of carbohydrate are in a meal. This lets you know how much rapid-acting insulin to take before you eat. If you use an insulin pump, you get a constant rate of insulin during the day. So the pump must be programmed at meals to give you extra insulin to cover the rise in blood sugar after meals. When you know how much carbohydrate you will eat, you can take the right amount of insulin. Or, if you always use the same amount of insulin, you need to make sure that you eat the same amount of carbohydrate at meals. If you need more help to understand carbohydrate counting and food labels, ask your doctor, dietitian, or diabetes educator. How do you get started with meal planning? Here are some tips to get started: 
· Plan your meals a week at a time. Don't forget to include snacks too. · Use cookbooks or online recipes to plan several main meals. Plan some quick meals for busy nights. You also can double some recipes that freeze well. Then you can save half for other busy nights when you don't have time to cook. · Make sure you have the ingredients you need for your recipes. If you're running low on basic items, put these items on your shopping list too. · List foods that you use to make breakfasts, lunches, and snacks. List plenty of fruits and vegetables. · Post this list on the refrigerator. Add to it as you think of more things you need. · Take the list to the store to do your weekly shopping. Follow-up care is a key part of your treatment and safety. Be sure to make and go to all appointments, and call your doctor if you are having problems. It's also a good idea to know your test results and keep a list of the medicines you take. Where can you learn more? Go to http://indio-kavita.info/. Christiana Roy in the search box to learn more about \"Learning About Meal Planning for Diabetes. \" Current as of: March 13, 2017 Content Version: 11.3 © 2195-8322 SQLstream. Care instructions adapted under license by Litesprite (which disclaims liability or warranty for this information). If you have questions about a medical condition or this instruction, always ask your healthcare professional. [place] [address] any warranty or liability for your use of this information. Introducing [facility] & HEALTH SERVICES! Dear Emi Orozco: Thank you for requesting a Handpressions account. Our records indicate that you already have an active Handpressions account. You can access your account anytime at [email or web address] Did you know that you can access your hospital and ER discharge instructions at any time in Handpressions? You can also review all of your test results from your hospital stay or ER visit. Additional Information If you have questions, please visit the Frequently Asked Questions section of the Handpressions website at [email or web address] Remember, Handpressions is NOT to be used for urgent needs. For medical emergencies, dial 911. Now available from your iPhone and Android! Please provide this summary of care documentation to your next provider. Your primary care clinician is listed as Ronda 51. If you have any questions after today's visit, please call [phone].

## 2017-10-11 NOTE — PROGRESS NOTES
Julisa Sánchez is a 74 y.o. male (: 1950) presenting to address:DM 4 month f/u    Chief Complaint   Patient presents with    Diabetes     4 month f/u       Vitals:    10/11/17 1340   BP: 124/80   Pulse: 68   Resp: 20   Temp: 97.6 °F (36.4 °C)   TempSrc: Oral   SpO2: 96%   Weight: 184 lb (83.5 kg)   Height: 5' 5\" (1.651 m)   PainSc:   0 - No pain       Hearing/Vision:   No exam data present    Learning Assessment:     Learning Assessment 2014   PRIMARY LEARNER Patient   HIGHEST LEVEL OF EDUCATION - PRIMARY LEARNER  GRADUATED HIGH SCHOOL OR GED   BARRIERS PRIMARY LEARNER NONE   PRIMARY LANGUAGE ENGLISH   LEARNER PREFERENCE PRIMARY VIDEOS   ANSWERED BY PATIENT   RELATIONSHIP SELF     Depression Screening:     PHQ over the last two weeks 2017   Little interest or pleasure in doing things Not at all   Feeling down, depressed or hopeless Not at all   Total Score PHQ 2 0     Fall Risk Assessment:   No flowsheet data found. Abuse Screening:     Abuse Screening Questionnaire 2014   Do you ever feel afraid of your partner? N   Are you in a relationship with someone who physically or mentally threatens you? N   Is it safe for you to go home? Y     Coordination of Care Questionaire:   1. Have you been to the ER, urgent care clinic since your last visit? Hospitalized since your last visit? no    2. Have you seen or consulted any other health care providers outside of the [address] [place] [address] since your last visit? Include any pap smears or colon screening. no    Advanced Directive:   1. Do you have an Advanced Directive? no    2. Would you like information on Advanced Directives? No    Patient declined flu vaccine.

## 2017-10-11 NOTE — PROGRESS NOTES
Assessment/Plan:    *Diagnoses and all orders for this visit:    1. Type 2 diabetes mellitus without complication, without long-term current use of insulin (HCC)  -     HEMOGLOBIN A1C W/O EAG; Future    2. Pure hypercholesterolemia  -     LIPID PANEL; Future        physical in Feb 2018. Labs above this week. Will contact pt on results. The plan was discussed with the patient. The patient verbalized understanding and is in agreement with the plan. All medication potential side effects were discussed with the patient.    -------------------------------------------------------------------------------------------------------------------        Chai Valencia is a 74 y.o. male and presents with Diabetes (4 month f/u)         Subjective:  DM: has been stable, needs repeat A1c. Was supposed to do labs prior to visit but did not. HLD: will repeat. ROS:  Constitutional: No recent weight change. No weakness/fatigue. No f/c. Skin: No rashes, change in nails/hair, itching   HENT: No HA, dizziness. No hearing loss/tinnitus. No nasal congestion/discharge. Eyes: No change in vision, double/blurred vision or eye pain/redness. Cardiovascular: No CP/palpitations. No MOE/orthopnea/PND. Respiratory: No cough/sputum, dyspnea, wheezing. Gastointestinal: No dysphagia, reflux. No n/v. No constipation/diarrhea. No melena/rectal bleeding. Genitourinary: No dysuria, urinary hesitancy, nocturia, hematuria. No incontinence. Musculoskeletal: No joint pain/stiffness. No muscle pain/tenderness. Endo: No heat/cold intolerance, no polyuria/polydypsia. Heme: No h/o anemia. No easy bleeding/bruising. Allergy/Immunology: No seasonal rhinitis. Denies frequent colds, sinus/ear infections. Neurological: No seizures/numbness/weakness. No paresthesias. Psychiatric:  No depression, anxiety. The problem list was updated as a part of today's visit.   Patient Active Problem List   Diagnosis Code    Hyperlipidemia E78.5    Lactose intolerance E73.9    Sinus congestion R09.81    CAD (coronary artery disease) I25.10    Vitamin D deficiency E55.9    Low serum testosterone level E29.1    Hiatal hernia K44.9    History of Helicobacter pylori infection Z86.19    Gastric ulcer K25.9    Impotence of organic origin N52.9    Diabetes mellitus (HCC) E11.9       The PSH, FH were reviewed. SH:  Social History   Substance Use Topics    Smoking status: Former Smoker     Packs/day: 1.00    Smokeless tobacco: Current User    Alcohol use Yes      Comment: occasionally       Medications/Allergies:  Current Outpatient Prescriptions on File Prior to Visit   Medication Sig Dispense Refill    simvastatin (ZOCOR) 20 mg tablet TAKE ONE TABLET BY MOUTH NIGHTLY 90 Tab 2    metFORMIN (GLUCOPHAGE) 500 mg tablet take 1 tablet by mouth twice a day with meals 180 Tab 1    sildenafil citrate (VIAGRA) 100 mg tablet Take 1 Tab by mouth daily as needed for up to 4 doses. 6 Tab 12    ONETOUCH ULTRA TEST strip TEST once daily 100 Strip 2    Lancets (ONETOUCH ULTRASOFT LANCETS) misc For once daily testing 1 Each 5    nitroglycerin (NITROSTAT) 0.4 mg SL tablet by SubLINGual route every five (5) minutes as needed for Chest Pain.  aspirin 81 mg tablet Take 81 mg by mouth daily. No current facility-administered medications on file prior to visit. Allergies   Allergen Reactions    Amaryl [Glimepiride] Vertigo     Patient reported that this medications made him very dizzy and light headed.  It was discontinued by Dr Lemon Esters Maintenance:   Health Maintenance   Topic Date Due    INFLUENZA AGE 9 TO ADULT  08/01/2017    Pneumococcal 65+ Low/Medium Risk (1 of 2 - PCV13) 08/29/2017    MEDICARE YEARLY EXAM  08/29/2017    HEMOGLOBIN A1C Q6M  12/13/2017    MICROALBUMIN Q1  02/05/2018    FOOT EXAM Q1  02/08/2018    EYE EXAM RETINAL OR DILATED Q1  06/01/2018    LIPID PANEL Q1  06/03/2018    GLAUCOMA SCREENING Q2Y  06/01/2019    COLONOSCOPY  03/22/2022    DTaP/Tdap/Td series (2 - Td) 12/16/2024    Hepatitis C Screening  Completed    ZOSTER VACCINE AGE 60>  Addressed       Objective:  Visit Vitals    /80 (BP 1 Location: Left arm, BP Patient Position: Sitting)    Pulse 68    Temp 97.6 °F (36.4 °C) (Oral)    Resp 20    Ht 5' 5\" (1.651 m)    Wt 184 lb (83.5 kg)    SpO2 96%    BMI 30.62 kg/m2          Nurses notes and VS reviewed. Physical Examination: General appearance - alert, well appearing, and in no distress  Chest - clear to auscultation, no wheezes, rales or rhonchi, symmetric air entry  Heart - normal rate, regular rhythm, normal S1, S2, no murmurs, rubs, clicks or gallops  Abdomen - soft, nontender, nondistended, no masses or organomegaly        Labwork and Ancillary Studies:    CBC w/Diff  Lab Results   Component Value Date/Time    WBC 5.5 02/05/2017 08:37 AM    HGB 15.1 02/05/2017 08:37 AM    PLATELET 365 00/89/9499 08:37 AM         Basic Metabolic Profile  Lab Results   Component Value Date/Time    Sodium 144 02/05/2017 08:37 AM    Potassium 4.0 02/05/2017 08:37 AM    Chloride 109 02/05/2017 08:37 AM    CO2 25 02/05/2017 08:37 AM    Anion gap 10 02/05/2017 08:37 AM    Glucose 102 02/05/2017 08:37 AM    BUN 18 02/05/2017 08:37 AM    Creatinine 1.08 02/05/2017 08:37 AM    BUN/Creatinine ratio 17 02/05/2017 08:37 AM    GFR est AA >60 02/05/2017 08:37 AM    GFR est non-AA >60 02/05/2017 08:37 AM    Calcium 9.0 02/05/2017 08:37 AM         LFT  Lab Results   Component Value Date/Time    ALT (SGPT) 25 02/05/2017 08:37 AM    AST (SGOT) 15 02/05/2017 08:37 AM    Alk.  phosphatase 76 02/05/2017 08:37 AM    Bilirubin, direct 0.2 02/05/2017 08:37 AM    Bilirubin, total 0.6 02/05/2017 08:37 AM         Cholesterol  Lab Results   Component Value Date/Time    Cholesterol, total 104 02/05/2017 08:37 AM    HDL Cholesterol 34 02/05/2017 08:37 AM    LDL, calculated 48 02/05/2017 08:37 AM Triglyceride 110 02/05/2017 08:37 AM    CHOL/HDL Ratio 3.1 02/05/2017 08:37 AM

## 2017-10-14 ENCOUNTER — HOSPITAL ENCOUNTER (OUTPATIENT)
Dept: LAB | Age: 67
Discharge: HOME | End: 2017-10-14
Payer: COMMERCIAL

## 2017-10-14 DIAGNOSIS — E78.00 PURE HYPERCHOLESTEROLEMIA: ICD-10-CM

## 2017-10-14 DIAGNOSIS — E11.9 TYPE 2 DIABETES MELLITUS WITHOUT COMPLICATION, WITHOUT LONG-TERM CURRENT USE OF INSULIN (HCC): ICD-10-CM

## 2017-10-14 LAB
CHOLEST SERPL-MCNC: 118 MG/DL
HBA1C MFR BLD: 6.4 % (ref 4.2–5.6)
HDLC SERPL-MCNC: 34 MG/DL (ref 40–60)
HDLC SERPL: 3.5 {RATIO} (ref 0–5)
LDLC SERPL CALC-MCNC: 55.4 MG/DL (ref 0–100)
LIPID PROFILE,FLP: ABNORMAL
TRIGL SERPL-MCNC: 143 MG/DL (ref ?–150)
VLDLC SERPL CALC-MCNC: 28.6 MG/DL

## 2017-10-14 PROCEDURE — 80061 LIPID PANEL: CPT | Performed by: INTERNAL MEDICINE

## 2017-10-14 PROCEDURE — 83036 HEMOGLOBIN GLYCOSYLATED A1C: CPT | Performed by: INTERNAL MEDICINE

## 2017-10-14 PROCEDURE — 36415 COLL VENOUS BLD VENIPUNCTURE: CPT | Performed by: INTERNAL MEDICINE

## 2017-10-25 ENCOUNTER — OFFICE VISIT (OUTPATIENT)
Dept: FAMILY MEDICINE CLINIC | Age: 67
End: 2017-10-25

## 2017-10-25 VITALS
SYSTOLIC BLOOD PRESSURE: 118 MMHG | HEART RATE: 76 BPM | DIASTOLIC BLOOD PRESSURE: 82 MMHG | WEIGHT: 182.8 LBS | BODY MASS INDEX: 30.46 KG/M2 | HEIGHT: 65 IN | TEMPERATURE: 97.8 F | OXYGEN SATURATION: 98 % | RESPIRATION RATE: 18 BRPM

## 2017-10-25 DIAGNOSIS — J22 ACUTE RESPIRATORY INFECTION: Primary | ICD-10-CM

## 2017-10-25 LAB
S PYO AG THROAT QL: NEGATIVE
VALID INTERNAL CONTROL?: YES

## 2017-10-25 NOTE — PATIENT INSTRUCTIONS
Viral URI VS allergy Sx  Try OTC antihistamine such as Claritin, Allegra Zyrtec  Take OTC acetaminophen (TYLENOL) or ibuprofen in age appropriate dosages if needed for pain or fever. Do not take aspirin. Avoid acidic beverages such as orange juice. Gargle with warm water, use throat lozenges as needed. Follow up for new symptoms, worsening symptoms or failure to improve. Upper Respiratory Infection (Cold): Care Instructions  Your Care Instructions    An upper respiratory infection, or URI, is an infection of the nose, sinuses, or throat. URIs are spread by coughs, sneezes, and direct contact. The common cold is the most frequent kind of URI. The flu and sinus infections are other kinds of URIs. Almost all URIs are caused by viruses. Antibiotics won't cure them. But you can treat most infections with home care. This may include drinking lots of fluids and taking over-the-counter pain medicine. You will probably feel better in 4 to 10 days. The doctor has checked you carefully, but problems can develop later. If you notice any problems or new symptoms, get medical treatment right away. Follow-up care is a key part of your treatment and safety. Be sure to make and go to all appointments, and call your doctor if you are having problems. It's also a good idea to know your test results and keep a list of the medicines you take. How can you care for yourself at home? · To prevent dehydration, drink plenty of fluids, enough so that your urine is light yellow or clear like water. Choose water and other caffeine-free clear liquids until you feel better. If you have kidney, heart, or liver disease and have to limit fluids, talk with your doctor before you increase the amount of fluids you drink. · Take an over-the-counter pain medicine, such as acetaminophen (Tylenol), ibuprofen (Advil, Motrin), or naproxen (Aleve). Read and follow all instructions on the label.   · Before you use cough and cold medicines, check the label. These medicines may not be safe for young children or for people with certain health problems. · Be careful when taking over-the-counter cold or flu medicines and Tylenol at the same time. Many of these medicines have acetaminophen, which is Tylenol. Read the labels to make sure that you are not taking more than the recommended dose. Too much acetaminophen (Tylenol) can be harmful. · Get plenty of rest.  · Do not smoke or allow others to smoke around you. If you need help quitting, talk to your doctor about stop-smoking programs and medicines. These can increase your chances of quitting for good. When should you call for help? Call 911 anytime you think you may need emergency care. For example, call if:  · You have severe trouble breathing. Call your doctor now or seek immediate medical care if:  · You seem to be getting much sicker. · You have new or worse trouble breathing. · You have a new or higher fever. · You have a new rash. Watch closely for changes in your health, and be sure to contact your doctor if:  · You have a new symptom, such as a sore throat, an earache, or sinus pain. · You cough more deeply or more often, especially if you notice more mucus or a change in the color of your mucus. · You do not get better as expected. Where can you learn more? Go to http://indio-kavita.info/. Enter I634 in the search box to learn more about \"Upper Respiratory Infection (Cold): Care Instructions. \"  Current as of: March 25, 2017  Content Version: 11.3  © 6514-8464 PrePlay. Care instructions adapted under license by [employer] (which disclaims liability or warranty for this information). If you have questions about a medical condition or this instruction, always ask your healthcare professional. [address] any warranty or liability for your use of this information.

## 2017-10-25 NOTE — MR AVS SNAPSHOT
Visit Information Date & Time Provider Department Dept. Phone Encounter #  
 10/25/2017 10:45 AM Juan Manuel Anderson MD [facility] [phone] [id number] Your Appointments 2/14/2018  1:30 PM  
PHYSICAL with Natacha Davis MD  
3 [facility] [address] [facility]) Appt Note: CPE  
 [address] [facility] [address] [phone]  
  
   
 [address] Upcoming Health Maintenance Date Due  
 MEDICARE YEARLY EXAM 8/29/2017 MICROALBUMIN Q1 2/5/2018 FOOT EXAM Q1 2/8/2018 HEMOGLOBIN A1C Q6M 4/14/2018 EYE EXAM RETINAL OR DILATED Q1 6/1/2018 Pneumococcal 65+ Low/Medium Risk (2 of 2 - PPSV23) 10/11/2018 LIPID PANEL Q1 10/14/2018 GLAUCOMA SCREENING Q2Y 6/1/2019 COLONOSCOPY 3/22/2022 DTaP/Tdap/Td series (2 - Td) 12/16/2024 Allergies as of 10/25/2017  Review Complete On: 10/25/2017 By: Juan Manuel Anderson MD  
  
 Severity Noted Reaction Type Reactions Amaryl [Glimepiride]  08/11/2015   Side Effect Vertigo Patient reported that this medications made him very dizzy and light headed. It was discontinued by Dr García Herman Current Immunizations  Reviewed on 10/11/2017 Name Date Tdap 12/16/2014 Not reviewed this visit You Were Diagnosed With   
  
 Codes Comments Acute respiratory infection    -  Primary ICD-10-CM: Rubin Garcia ICD-9-CM: 519.8 Vitals BP Pulse Temp Resp Height(growth percentile) Weight(growth percentile) 118/82 (BP 1 Location: Left arm, BP Patient Position: Sitting) 76 97.8 °F (36.6 °C) (Oral) 18 5' 5\" (1.651 m) 182 lb 12.8 oz (82.9 kg) SpO2 BMI Smoking Status 98% 30.42 kg/m2 Former Smoker Vitals History BMI and BSA Data Body Mass Index Body Surface Area  
 30.42 kg/m 2 1.95 m 2 Preferred Pharmacy Pharmacy Name Phone [address] [phone] Your Updated Medication List  
  
   
This list is accurate as of: 10/25/17 11:04 AM.  Always use your most recent med list.  
  
  
  
  
 Ismael Lynn Commonly known as:  ONETOUCH ULTRASOFT LANCETS For once daily testing  
  
 metFORMIN 500 mg tablet Commonly known as:  GLUCOPHAGE  
take 1 tablet by mouth twice a day with meals NITROSTAT 0.4 mg SL tablet Generic drug:  nitroglycerin  
by SubLINGual route every five (5) minutes as needed for Chest Pain. ONETOUCH ULTRA TEST strip Generic drug:  glucose blood VI test strips TEST once daily  
  
 sildenafil citrate 100 mg tablet Commonly known as:  VIAGRA Take 1 Tab by mouth daily as needed for up to 4 doses. simvastatin 20 mg tablet Commonly known as:  ZOCOR  
TAKE ONE TABLET BY MOUTH NIGHTLY We Performed the Following AMB POC RAPID STREP A [60903 CPT(R)] Patient Instructions Viral URI VS allergy Sx Try OTC antihistamine such as Claritin, Allegra Zyrtec Take OTC acetaminophen (TYLENOL) or ibuprofen in age appropriate dosages if needed for pain or fever. Do not take aspirin. Avoid acidic beverages such as orange juice. Gargle with warm water, use throat lozenges as needed. Follow up for new symptoms, worsening symptoms or failure to improve. Upper Respiratory Infection (Cold): Care Instructions Your Care Instructions An upper respiratory infection, or URI, is an infection of the nose, sinuses, or throat. URIs are spread by coughs, sneezes, and direct contact. The common cold is the most frequent kind of URI. The flu and sinus infections are other kinds of URIs. Almost all URIs are caused by viruses. Antibiotics won't cure them. But you can treat most infections with home care. This may include drinking lots of fluids and taking over-the-counter pain medicine.  You will probably feel better in 4 to 10 days. The doctor has checked you carefully, but problems can develop later. If you notice any problems or new symptoms, get medical treatment right away. Follow-up care is a key part of your treatment and safety. Be sure to make and go to all appointments, and call your doctor if you are having problems. It's also a good idea to know your test results and keep a list of the medicines you take. How can you care for yourself at home? · To prevent dehydration, drink plenty of fluids, enough so that your urine is light yellow or clear like water. Choose water and other caffeine-free clear liquids until you feel better. If you have kidney, heart, or liver disease and have to limit fluids, talk with your doctor before you increase the amount of fluids you drink. · Take an over-the-counter pain medicine, such as acetaminophen (Tylenol), ibuprofen (Advil, Motrin), or naproxen (Aleve). Read and follow all instructions on the label. · Before you use cough and cold medicines, check the label. These medicines may not be safe for young children or for people with certain health problems. · Be careful when taking over-the-counter cold or flu medicines and Tylenol at the same time. Many of these medicines have acetaminophen, which is Tylenol. Read the labels to make sure that you are not taking more than the recommended dose. Too much acetaminophen (Tylenol) can be harmful. · Get plenty of rest. 
· Do not smoke or allow others to smoke around you. If you need help quitting, talk to your doctor about stop-smoking programs and medicines. These can increase your chances of quitting for good. When should you call for help? Call 911 anytime you think you may need emergency care. For example, call if: 
· You have severe trouble breathing. Call your doctor now or seek immediate medical care if: 
· You seem to be getting much sicker. · You have new or worse trouble breathing. · You have a new or higher fever. · You have a new rash. Watch closely for changes in your health, and be sure to contact your doctor if: 
· You have a new symptom, such as a sore throat, an earache, or sinus pain. · You cough more deeply or more often, especially if you notice more mucus or a change in the color of your mucus. · You do not get better as expected. Where can you learn more? Go to http://indio-kavita.info/. Enter P505 in the search box to learn more about \"Upper Respiratory Infection (Cold): Care Instructions. \" Current as of: March 25, 2017 Content Version: 11.3 © 5300-1337 CleanMy[employer]M. Care instructions adapted under license by [employer] (which disclaims liability or warranty for this information). If you have questions about a medical condition or this instruction, always ask your healthcare professional. [place] [address] any warranty or liability for your use of this information. Introducing [facility] & HEALTH [facility]! Dear Iesha Britt: Thank you for requesting a Eduvant account. Our records indicate that you already have an active Eduvant account. You can access your account anytime at [email or web address] Did you know that you can access your hospital and ER discharge instructions at any time in Eduvant? You can also review all of your test results from your hospital stay or ER visit. Additional Information If you have questions, please visit the Frequently Asked Questions section of the Eduvant website at [email or web address] Remember, Eduvant is NOT to be used for urgent needs. For medical emergencies, dial 911. Now available from your iPhone and Android! Please provide this summary of care documentation to your next provider. Your primary care clinician is listed as Ronda 51. If you have any questions after today's visit, please call [phone].

## 2017-10-25 NOTE — PROGRESS NOTES
Torito Choi is a 74 y.o. male here for sore throat    Torito Choi is a 74 y.o. male (: 1950) presenting to address:    Chief Complaint   Patient presents with    Cold Symptoms     pt states since saturday he's had a sore throat, hoarse, congestion,cough        Vitals:    10/25/17 1041   BP: 118/82   Pulse: 76   Resp: 18   SpO2: 98%   Weight: 182 lb 12.8 oz (82.9 kg)   Height: 5' 5\" (1.651 m)   PainSc:   2   PainLoc: Throat       Hearing/Vision:   No exam data present    Learning Assessment:     Learning Assessment 2014   PRIMARY LEARNER Patient   HIGHEST LEVEL OF EDUCATION - PRIMARY LEARNER  GRADUATED HIGH SCHOOL OR GED   BARRIERS PRIMARY LEARNER NONE   PRIMARY LANGUAGE ENGLISH   LEARNER PREFERENCE PRIMARY VIDEOS   ANSWERED BY PATIENT   RELATIONSHIP SELF     Depression Screening:     PHQ over the last two weeks 10/25/2017   Little interest or pleasure in doing things Not at all   Feeling down, depressed or hopeless Not at all   Total Score PHQ 2 0     Fall Risk Assessment:     Fall Risk Assessment, last 12 mths 10/25/2017   Able to walk? Yes   Fall in past 12 months? No     Abuse Screening:     Abuse Screening Questionnaire 2014   Do you ever feel afraid of your partner? N   Are you in a relationship with someone who physically or mentally threatens you? N   Is it safe for you to go home? Y     Coordination of Care Questionaire:   1. Have you been to the ER, urgent care clinic since your last visit? Hospitalized since your last visit? NO    2. Have you seen or consulted any other health care providers outside of the [address] [place] [address] since your last visit? Include any pap smears or colon screening. NO    Advanced Directive:   1. Do you have an Advanced Directive? NO    2. Would you like information on Advanced Directives?  NO

## 2017-10-25 NOTE — PROGRESS NOTES
HISTORY OF PRESENT ILLNESS  Severa Gourd is a 74 y.o. male. Cold Symptoms   The history is provided by the patient and medical records. This is a new problem. Episode onset: 5 days ago. Associated symptoms include headaches (better now) and sore throat. Pertinent negatives include no chills. Patient Active Problem List   Diagnosis Code    Hyperlipidemia E78.5    Lactose intolerance E73.9    Sinus congestion R09.81    CAD (coronary artery disease) I25.10    Vitamin D deficiency E55.9    Low serum testosterone level E29.1    Hiatal hernia K44.9    History of Helicobacter pylori infection Z86.19    Gastric ulcer K25.9    Impotence of organic origin N52.9    [facility] E11.9       Current Outpatient Prescriptions:     ONETOUCH ULTRA TEST strip, TEST once daily, Disp: 300 Strip, Rfl: 2    simvastatin (ZOCOR) 20 mg tablet, TAKE ONE TABLET BY MOUTH NIGHTLY, Disp: 90 Tab, Rfl: 2    metFORMIN (GLUCOPHAGE) 500 mg tablet, take 1 tablet by mouth twice a day with meals, Disp: 180 Tab, Rfl: 1    sildenafil citrate (VIAGRA) 100 mg tablet, Take 1 Tab by mouth daily as needed for up to 4 doses. , Disp: 6 Tab, Rfl: 12    Lancets (ONETOUCH ULTRASOFT LANCETS) [facility], For once daily testing, Disp: 1 Each, Rfl: 5    nitroglycerin (NITROSTAT) 0.4 mg SL tablet, by SubLINGual route every five (5) minutes as needed for Chest Pain., Disp: , Rfl:       Review of Systems   Constitutional: Negative for chills and fever. HENT: Positive for congestion and sore throat. Respiratory: Positive for cough and sputum production (a little). Neurological: Positive for headaches (better now). Visit Vitals    /82 (BP 1 Location: Left arm, BP Patient Position: Sitting)    Pulse 76    Temp 97.8 °F (36.6 °C) (Oral)    Resp 18    Ht 5' 5\" (1.651 m)    Wt 182 lb 12.8 oz (82.9 kg)    SpO2 98%    BMI 30.42 kg/m2     Physical Exam   Constitutional: He is oriented to person, place, and time.  He appears well-developed and well-nourished. HENT:   Head: Normocephalic. Right Ear: Tympanic membrane and ear canal normal.   Left Ear: Tympanic membrane and ear canal normal.   Mouth/Throat: Oropharynx is clear and moist.   Eyes: Conjunctivae and EOM are normal.   Neck: Neck supple. Cardiovascular: Normal rate, regular rhythm and normal heart sounds. Pulmonary/Chest: Effort normal and breath sounds normal.   Lymphadenopathy:     He has no cervical adenopathy. Neurological: He is alert and oriented to person, place, and time. Skin: Skin is warm and dry. Psychiatric: He has a normal mood and affect. His behavior is normal.   Nursing note and vitals reviewed. Rapid strep negative  ASSESSMENT and PLAN    ICD-10-CM ICD-9-CM    1. Acute respiratory infection J22 519.8 AMB POC RAPID STREP A   Viral URI VS allergy Sx  Try OTC antihistamine such as Claritin, Allegra Zyrtec  Take OTC acetaminophen (TYLENOL) or ibuprofen in age appropriate dosages if needed for pain or fever. Do not take aspirin. Avoid acidic beverages such as orange juice. Gargle with warm water, use throat lozenges as needed. Follow up for new symptoms, worsening symptoms or failure to improve.

## 2017-11-27 RX ORDER — METFORMIN HYDROCHLORIDE 500 MG/1
TABLET ORAL
Qty: 180 TAB | Refills: 1 | Status: SHIPPED | OUTPATIENT
Start: 2017-11-27 | End: 2018-05-26 | Stop reason: SDUPTHER

## 2018-03-19 ENCOUNTER — TELEPHONE (OUTPATIENT)
Dept: FAMILY MEDICINE CLINIC | Age: 68
End: 2018-03-19

## 2018-03-19 DIAGNOSIS — E11.9 TYPE 2 DIABETES MELLITUS WITHOUT COMPLICATION, WITHOUT LONG-TERM CURRENT USE OF INSULIN (HCC): ICD-10-CM

## 2018-03-19 DIAGNOSIS — Z00.00 ANNUAL PHYSICAL EXAM: Primary | ICD-10-CM

## 2018-03-19 DIAGNOSIS — Z12.5 PROSTATE CANCER SCREENING: ICD-10-CM

## 2018-03-19 DIAGNOSIS — E78.00 PURE HYPERCHOLESTEROLEMIA: ICD-10-CM

## 2018-03-19 NOTE — TELEPHONE ENCOUNTER
Patient has a CPE scheduled and is needing to have lab orders place prior to his appointment     Future Appointments  Date Time Provider Renuka Blunt   3/23/2018 1:30 PM Michele Aguilar MD [facility]

## 2018-03-23 ENCOUNTER — OFFICE VISIT (OUTPATIENT)
Dept: FAMILY MEDICINE CLINIC | Age: 68
End: 2018-03-23

## 2018-03-23 VITALS
SYSTOLIC BLOOD PRESSURE: 130 MMHG | TEMPERATURE: 97.8 F | BODY MASS INDEX: 30.99 KG/M2 | HEART RATE: 75 BPM | DIASTOLIC BLOOD PRESSURE: 76 MMHG | WEIGHT: 186 LBS | RESPIRATION RATE: 20 BRPM | HEIGHT: 65 IN | OXYGEN SATURATION: 94 %

## 2018-03-23 DIAGNOSIS — R42 VERTIGO: Primary | ICD-10-CM

## 2018-03-23 RX ORDER — MECLIZINE HYDROCHLORIDE 25 MG/1
TABLET ORAL
COMMUNITY
End: 2018-04-27 | Stop reason: ALTCHOICE

## 2018-03-23 NOTE — MR AVS SNAPSHOT
[address] [address] [place]  [address] [facility] [address] [phone] Patient: Tyson Lomax MRN: [id number] IMS:9[birth year]27 Visit Information Date & Time Provider Department Dept. Phone Encounter #  
 3/23/2018  1:30 PM Yamileth Zhang [place] [phone] [id number] Upcoming Health Maintenance Date Due MICROALBUMIN Q1 2/5/2018 FOOT EXAM Q1 2/8/2018 HEMOGLOBIN A1C Q6M 4/14/2018 Pneumococcal 65+ Low/Medium Risk (2 of 2 - PPSV23) 10/11/2018 LIPID PANEL Q1 10/14/2018 EYE EXAM RETINAL OR DILATED Q1 10/19/2018 GLAUCOMA SCREENING Q2Y 10/19/2019 COLONOSCOPY 3/22/2022 DTaP/Tdap/Td series (2 - Td) 12/16/2024 Allergies as of 3/23/2018  Review Complete On: 3/23/2018 By: Karen Arce LPN Severity Noted Reaction Type Reactions Amaryl [Glimepiride]  08/11/2015   Side Effect Vertigo Patient reported that this medications made him very dizzy and light headed. It was discontinued by Dr Cyndi Castano Current Immunizations  Reviewed on 10/11/2017 Name Date Tdap 12/16/2014 Not reviewed this visit Vitals BP Pulse Temp Resp Height(growth percentile) Weight(growth percentile) 130/76 (BP 1 Location: Left arm, BP Patient Position: Sitting) 75 97.8 °F (36.6 °C) (Oral) 20 5' 5\" (1.651 m) 186 lb (84.4 kg) SpO2 BMI Smoking Status 94% 30.95 kg/m2 Former Smoker Vitals History BMI and BSA Data Body Mass Index Body Surface Area 30.95 kg/m 2 1.97 m 2 Preferred Pharmacy Pharmacy Name Phone [address]  [address] [phone] Your Updated Medication List  
  
   
This list is accurate as of 3/23/18  2:05 PM.  Always use your most recent med list.  
  
  
  
  
 Dena Bench Commonly known as:  ONETOUCH ULTRASOFT LANCETS For once daily testing  
  
 meclizine 25 mg tablet Commonly known as:  ANTIVERT Take  by mouth three (3) times daily as needed. metFORMIN 500 mg tablet Commonly known as:  GLUCOPHAGE  
take 1 tablet by mouth twice a day with meals NITROSTAT 0.4 mg SL tablet Generic drug:  nitroglycerin  
by SubLINGual route every five (5) minutes as needed for Chest Pain. ONETOUCH ULTRA TEST strip Generic drug:  glucose blood VI test strips TEST once daily  
  
 sildenafil citrate 100 mg tablet Commonly known as:  VIAGRA Take 1 Tab by mouth daily as needed for up to 4 doses. simvastatin 20 mg tablet Commonly known as:  ZOCOR  
TAKE ONE TABLET BY MOUTH NIGHTLY Introducing [facility]! Dear Junaid Joe: Thank you for requesting a FooPets account. Our records indicate that you already have an active FooPets account. You can access your account anytime at [email or web address] Did you know that you can access your hospital and ER discharge instructions at any time in FooPets? You can also review all of your test results from your hospital stay or ER visit. Additional Information If you have questions, please visit the Frequently Asked Questions section of the FooPets website at [email or web address] Remember, FooPets is NOT to be used for urgent needs. For medical emergencies, dial 911. Now available from your iPhone and Android! Please provide this summary of care documentation to your next provider. Your primary care clinician is listed as Ronda 51. If you have any questions after today's visit, please call [phone].

## 2018-03-23 NOTE — PROGRESS NOTES
Assessment/Plan:    *Diagnoses and all orders for this visit:    1. Vertigo      Will return for physical with labs prior. The plan was discussed with the patient. The patient verbalized understanding and is in agreement with the plan. All medication potential side effects were discussed with the patient.    -------------------------------------------------------------------------------------------------------------------        Danni Alex is a 74 y.o. male and presents with Follow-up (patient first) and Dizziness         Subjective:  Pt here for f/u of a recent visit to urgent care. He was diagnosed with vertigo. Was given meclizine, which he has been taking. He was feeling drowsy from it so has cut back. Also taking Claritin for sinuses. Was told he had fluid in the left ear, which he feels is getting better. The spinning sensation has resolved, just a little lightheadedness. Much better. ROS:  Constitutional: No recent weight change. No weakness/fatigue. No f/c. Skin: No rashes, change in nails/hair, itching   HENT: No HA, dizziness. No hearing loss/tinnitus. No nasal congestion/discharge. Eyes: No change in vision, double/blurred vision or eye pain/redness. Cardiovascular: No CP/palpitations. No MOE/orthopnea/PND. Respiratory: No cough/sputum, dyspnea, wheezing. Gastointestinal: No dysphagia, reflux. No n/v. No constipation/diarrhea. No melena/rectal bleeding. Genitourinary: No dysuria, urinary hesitancy, nocturia, hematuria. No incontinence. Musculoskeletal: No joint pain/stiffness. No muscle pain/tenderness. Endo: No heat/cold intolerance, no polyuria/polydypsia. Heme: No h/o anemia. No easy bleeding/bruising. Allergy/Immunology: No seasonal rhinitis. Denies frequent colds, sinus/ear infections. Neurological: No seizures/numbness/weakness. No paresthesias. Psychiatric:  No depression, anxiety.      The problem list was updated as a part of today's visit. Patient Active Problem List   Diagnosis Code    Hyperlipidemia E78.5    Lactose intolerance E73.9    Sinus congestion R09.81    CAD (coronary artery disease) I25.10    Vitamin D deficiency E55.9    Low serum testosterone level E29.1    Hiatal hernia K44.9    History of Helicobacter pylori infection Z86.19    Gastric ulcer K25.9    Impotence of organic origin N52.9    Diabetes mellitus ([address] E11.9       The PSH, FH were reviewed. SH:  Social History   Substance Use Topics    Smoking status: Former Smoker     Packs/day: 1.00    Smokeless tobacco: Current User    Alcohol use Yes      Comment: occasionally       Medications/Allergies:  Current Outpatient Prescriptions on File Prior to Visit   Medication Sig Dispense Refill    metFORMIN (GLUCOPHAGE) 500 mg tablet take 1 tablet by mouth twice a day with meals 180 Tab 1    ONETOUCH ULTRA TEST strip TEST once daily 300 Strip 2    simvastatin (ZOCOR) 20 mg tablet TAKE ONE TABLET BY MOUTH NIGHTLY 90 Tab 2    sildenafil citrate (VIAGRA) 100 mg tablet Take 1 Tab by mouth daily as needed for up to 4 doses. 6 Tab 12    Lancets (ONETOUCH ULTRASOFT LANCETS) misc For once daily testing 1 Each 5    nitroglycerin (NITROSTAT) 0.4 mg SL tablet by SubLINGual route every five (5) minutes as needed for Chest Pain. No current facility-administered medications on file prior to visit. Allergies   Allergen Reactions    Amaryl [Glimepiride] Vertigo     Patient reported that this medications made him very dizzy and light headed.  It was discontinued by Dr Barney Estrada Maintenance:   Health Maintenance   Topic Date Due    MICROALBUMIN Q1  02/05/2018    FOOT EXAM Q1  02/08/2018    HEMOGLOBIN A1C Q6M  04/14/2018    Pneumococcal 65+ Low/Medium Risk (2 of 2 - PPSV23) 10/11/2018    LIPID PANEL Q1  10/14/2018    EYE EXAM RETINAL OR DILATED Q1  10/19/2018    GLAUCOMA SCREENING Q2Y  10/19/2019    COLONOSCOPY  03/22/2022    DTaP/Tdap/Td series (2 - Td) 12/16/2024    Hepatitis C Screening  Completed    ZOSTER VACCINE AGE 60>  Addressed    Influenza Age 5 to Adult  Addressed       Objective:  Visit Vitals    /76 (BP 1 Location: Left arm, BP Patient Position: Sitting)    Pulse 75    Temp 97.8 °F (36.6 °C) (Oral)    Resp 20    Ht 5' 5\" (1.651 m)    Wt 186 lb (84.4 kg)    SpO2 94%    BMI 30.95 kg/m2          Nurses notes and VS reviewed. Physical Examination: General appearance - alert, well appearing, and in no distress  Ears - bilateral TM's and external ear canals normal        Labwork and Ancillary Studies:    CBC w/Diff  Lab Results   Component Value Date/Time    WBC 5.5 02/05/2017 08:37 AM    HGB 15.1 02/05/2017 08:37 AM    PLATELET 872 (L) 43/28/2295 08:37 AM         Basic Metabolic Profile  Lab Results   Component Value Date/Time    Sodium 144 02/05/2017 08:37 AM    Potassium 4.0 02/05/2017 08:37 AM    Chloride 109 (H) 02/05/2017 08:37 AM    CO2 25 02/05/2017 08:37 AM    Anion gap 10 02/05/2017 08:37 AM    Glucose 102 (H) 02/05/2017 08:37 AM    BUN 18 02/05/2017 08:37 AM    Creatinine 1.08 02/05/2017 08:37 AM    BUN/Creatinine ratio 17 02/05/2017 08:37 AM    GFR est AA >60 02/05/2017 08:37 AM    GFR est non-AA >60 02/05/2017 08:37 AM    Calcium 9.0 02/05/2017 08:37 AM         LFT  Lab Results   Component Value Date/Time    ALT (SGPT) 25 02/05/2017 08:37 AM    AST (SGOT) 15 02/05/2017 08:37 AM    Alk.  phosphatase 76 02/05/2017 08:37 AM    Bilirubin, direct 0.2 02/05/2017 08:37 AM    Bilirubin, total 0.6 02/05/2017 08:37 AM         Cholesterol  Lab Results   Component Value Date/Time    Cholesterol, total 118 10/14/2017 10:07 AM    HDL Cholesterol 34 (L) 10/14/2017 10:07 AM    LDL, calculated 55.4 10/14/2017 10:07 AM    Triglyceride 143 10/14/2017 10:07 AM    CHOL/HDL Ratio 3.5 10/14/2017 10:07 AM

## 2018-03-23 NOTE — PROGRESS NOTES
Michael Burch is a 74 y.o. male (: 1950) presenting to address:    Chief Complaint   Patient presents with    Follow-up     patient first    Dizziness       Vitals:    18 1309   BP: 130/76   Pulse: 75   Resp: 20   Temp: 97.8 °F (36.6 °C)   TempSrc: Oral   SpO2: 94%   Weight: 186 lb (84.4 kg)   Height: 5' 5\" (1.651 m)   PainSc:   4   PainLoc: Neck       Hearing/Vision:   No exam data present    Learning Assessment:     Learning Assessment 2014   PRIMARY LEARNER Patient   HIGHEST LEVEL OF EDUCATION - PRIMARY LEARNER  GRADUATED HIGH SCHOOL OR GED   BARRIERS PRIMARY LEARNER NONE   PRIMARY LANGUAGE ENGLISH   LEARNER PREFERENCE PRIMARY VIDEOS   ANSWERED BY PATIENT   RELATIONSHIP SELF     Depression Screening:     PHQ over the last two weeks 3/23/2018   Little interest or pleasure in doing things Several days   Feeling down, depressed or hopeless Several days   Total Score PHQ 2 2     Fall Risk Assessment:     Fall Risk Assessment, last 12 mths 3/23/2018   Able to walk? Yes   Fall in past 12 months? No     Abuse Screening:     Abuse Screening Questionnaire 3/23/2018   Do you ever feel afraid of your partner? N   Are you in a relationship with someone who physically or mentally threatens you? N   Is it safe for you to go home? Y     Coordination of Care Questionaire:   1. Have you been to the ER, urgent care clinic since your last visit? Hospitalized since your last visit? YES urgent care    2. Have you seen or consulted any other health care providers outside of the [address] [place] [address] since your last visit? Include any pap smears or colon screening. NO    Advanced Directive:   1. Do you have an Advanced Directive? NO    2. Would you like information on Advanced Directives?  NO

## 2018-04-19 ENCOUNTER — HOSPITAL ENCOUNTER (OUTPATIENT)
Dept: LAB | Age: 68
Discharge: HOME | End: 2018-04-19
Payer: COMMERCIAL

## 2018-04-19 DIAGNOSIS — E11.9 TYPE 2 DIABETES MELLITUS WITHOUT COMPLICATION, WITHOUT LONG-TERM CURRENT USE OF INSULIN (HCC): ICD-10-CM

## 2018-04-19 DIAGNOSIS — E78.00 PURE HYPERCHOLESTEROLEMIA: ICD-10-CM

## 2018-04-19 DIAGNOSIS — Z12.5 PROSTATE CANCER SCREENING: ICD-10-CM

## 2018-04-19 DIAGNOSIS — Z00.00 ANNUAL PHYSICAL EXAM: ICD-10-CM

## 2018-04-19 LAB
25(OH)D3 SERPL-MCNC: 21.7 NG/ML (ref 30–100)
ALBUMIN SERPL-MCNC: 4 G/DL (ref 3.4–5)
ALBUMIN/GLOB SERPL: 1.1 {RATIO} (ref 0.8–1.7)
ALP SERPL-CCNC: 88 U/L (ref 45–117)
ALT SERPL-CCNC: 38 U/L (ref 16–61)
ANION GAP SERPL CALC-SCNC: 7 MMOL/L (ref 3–18)
APPEARANCE UR: CLEAR
AST SERPL-CCNC: 25 U/L (ref 15–37)
BASOPHILS # BLD: 0 K/UL (ref 0–0.06)
BASOPHILS NFR BLD: 0 % (ref 0–2)
BILIRUB DIRECT SERPL-MCNC: 0.1 MG/DL (ref 0–0.2)
BILIRUB SERPL-MCNC: 0.4 MG/DL (ref 0.2–1)
BILIRUB UR QL: NEGATIVE
BUN SERPL-MCNC: 15 MG/DL (ref 7–18)
BUN/CREAT SERPL: 13 (ref 12–20)
CALCIUM SERPL-MCNC: 9.6 MG/DL (ref 8.5–10.1)
CHLORIDE SERPL-SCNC: 107 MMOL/L (ref 100–108)
CHOLEST SERPL-MCNC: 108 MG/DL
CO2 SERPL-SCNC: 29 MMOL/L (ref 21–32)
COLOR UR: YELLOW
CREAT SERPL-MCNC: 1.2 MG/DL (ref 0.6–1.3)
CREAT UR-MCNC: 286.75 MG/DL (ref 30–125)
DIFFERENTIAL METHOD BLD: ABNORMAL
EOSINOPHIL # BLD: 0.2 K/UL (ref 0–0.4)
EOSINOPHIL NFR BLD: 3 % (ref 0–5)
ERYTHROCYTE [DISTWIDTH] IN BLOOD BY AUTOMATED COUNT: 13.6 % (ref 11.6–14.5)
GLOBULIN SER CALC-MCNC: 3.6 G/DL (ref 2–4)
GLUCOSE SERPL-MCNC: 106 MG/DL (ref 74–99)
GLUCOSE UR STRIP.AUTO-MCNC: NEGATIVE MG/DL
HBA1C MFR BLD: 6.5 % (ref 4.2–5.6)
HCT VFR BLD AUTO: 44.3 % (ref 36–48)
HDLC SERPL-MCNC: 28 MG/DL (ref 40–60)
HDLC SERPL: 3.9 {RATIO} (ref 0–5)
HGB BLD-MCNC: 14.4 G/DL (ref 13–16)
HGB UR QL STRIP: NEGATIVE
KETONES UR QL STRIP.AUTO: ABNORMAL MG/DL
LDLC SERPL CALC-MCNC: 20 MG/DL (ref 0–100)
LEUKOCYTE ESTERASE UR QL STRIP.AUTO: NEGATIVE
LIPID PROFILE,FLP: ABNORMAL
LYMPHOCYTES # BLD: 3.2 K/UL (ref 0.9–3.6)
LYMPHOCYTES NFR BLD: 42 % (ref 21–52)
MCH RBC QN AUTO: 30.8 PG (ref 24–34)
MCHC RBC AUTO-ENTMCNC: 32.5 G/DL (ref 31–37)
MCV RBC AUTO: 94.9 FL (ref 74–97)
MICROALBUMIN UR-MCNC: 1.1 MG/DL (ref 0–3)
MICROALBUMIN/CREAT UR-RTO: 4 MG/G (ref 0–30)
MONOCYTES # BLD: 0.7 K/UL (ref 0.05–1.2)
MONOCYTES NFR BLD: 9 % (ref 3–10)
NEUTS SEG # BLD: 3.4 K/UL (ref 1.8–8)
NEUTS SEG NFR BLD: 46 % (ref 40–73)
NITRITE UR QL STRIP.AUTO: NEGATIVE
PH UR STRIP: 5 [PH] (ref 5–8)
PLATELET # BLD AUTO: 128 K/UL (ref 135–420)
PMV BLD AUTO: 11.6 FL (ref 9.2–11.8)
POTASSIUM SERPL-SCNC: 4.2 MMOL/L (ref 3.5–5.5)
PROT SERPL-MCNC: 7.6 G/DL (ref 6.4–8.2)
PROT UR STRIP-MCNC: NEGATIVE MG/DL
PSA SERPL-MCNC: 0.5 NG/ML (ref 0–4)
RBC # BLD AUTO: 4.67 M/UL (ref 4.7–5.5)
SODIUM SERPL-SCNC: 143 MMOL/L (ref 136–145)
SP GR UR REFRACTOMETRY: 1.03 (ref 1–1.03)
T4 FREE SERPL-MCNC: 1.1 NG/DL (ref 0.7–1.5)
TRIGL SERPL-MCNC: 300 MG/DL (ref ?–150)
TSH SERPL DL<=0.05 MIU/L-ACNC: 0.55 UIU/ML (ref 0.36–3.74)
UROBILINOGEN UR QL STRIP.AUTO: 0.2 EU/DL (ref 0.2–1)
VLDLC SERPL CALC-MCNC: 60 MG/DL
WBC # BLD AUTO: 7.5 K/UL (ref 4.6–13.2)

## 2018-04-19 PROCEDURE — 81003 URINALYSIS AUTO W/O SCOPE: CPT | Performed by: INTERNAL MEDICINE

## 2018-04-19 PROCEDURE — 82043 UR ALBUMIN QUANTITATIVE: CPT | Performed by: INTERNAL MEDICINE

## 2018-04-19 PROCEDURE — 36415 COLL VENOUS BLD VENIPUNCTURE: CPT | Performed by: INTERNAL MEDICINE

## 2018-04-19 PROCEDURE — 84443 ASSAY THYROID STIM HORMONE: CPT | Performed by: INTERNAL MEDICINE

## 2018-04-19 PROCEDURE — 80076 HEPATIC FUNCTION PANEL: CPT | Performed by: INTERNAL MEDICINE

## 2018-04-19 PROCEDURE — 84439 ASSAY OF FREE THYROXINE: CPT | Performed by: INTERNAL MEDICINE

## 2018-04-19 PROCEDURE — 80061 LIPID PANEL: CPT | Performed by: INTERNAL MEDICINE

## 2018-04-19 PROCEDURE — 85025 COMPLETE CBC W/AUTO DIFF WBC: CPT | Performed by: INTERNAL MEDICINE

## 2018-04-19 PROCEDURE — 80048 BASIC METABOLIC PNL TOTAL CA: CPT | Performed by: INTERNAL MEDICINE

## 2018-04-19 PROCEDURE — 84153 ASSAY OF PSA TOTAL: CPT | Performed by: INTERNAL MEDICINE

## 2018-04-19 PROCEDURE — 82306 VITAMIN D 25 HYDROXY: CPT | Performed by: INTERNAL MEDICINE

## 2018-04-19 PROCEDURE — 83036 HEMOGLOBIN GLYCOSYLATED A1C: CPT | Performed by: INTERNAL MEDICINE

## 2018-04-20 ENCOUNTER — TELEPHONE (OUTPATIENT)
Dept: FAMILY MEDICINE CLINIC | Age: 68
End: 2018-04-20

## 2018-04-24 NOTE — TELEPHONE ENCOUNTER
Spoke with patient unsure of who called him as I don't; have anything in his chart he voiced understanding

## 2018-04-27 ENCOUNTER — OFFICE VISIT (OUTPATIENT)
Dept: FAMILY MEDICINE CLINIC | Age: 68
End: 2018-04-27

## 2018-04-27 VITALS
SYSTOLIC BLOOD PRESSURE: 122 MMHG | TEMPERATURE: 97.8 F | HEART RATE: 72 BPM | BODY MASS INDEX: 29.57 KG/M2 | DIASTOLIC BLOOD PRESSURE: 80 MMHG | HEIGHT: 66 IN | WEIGHT: 184 LBS | OXYGEN SATURATION: 99 % | RESPIRATION RATE: 17 BRPM

## 2018-04-27 DIAGNOSIS — N49.2 SCROTAL ABSCESS: ICD-10-CM

## 2018-04-27 DIAGNOSIS — E55.9 VITAMIN D DEFICIENCY: ICD-10-CM

## 2018-04-27 DIAGNOSIS — E78.00 PURE HYPERCHOLESTEROLEMIA: ICD-10-CM

## 2018-04-27 DIAGNOSIS — Z00.00 ANNUAL PHYSICAL EXAM: Primary | ICD-10-CM

## 2018-04-27 DIAGNOSIS — Z91.09 ENVIRONMENTAL ALLERGIES: ICD-10-CM

## 2018-04-27 DIAGNOSIS — E11.9 TYPE 2 DIABETES MELLITUS WITHOUT COMPLICATION, WITHOUT LONG-TERM CURRENT USE OF INSULIN (HCC): ICD-10-CM

## 2018-04-27 PROBLEM — K42.9 UMBILICAL HERNIA WITHOUT OBSTRUCTION AND WITHOUT GANGRENE: Status: ACTIVE | Noted: 2018-04-27

## 2018-04-27 RX ORDER — PREDNISONE 20 MG/1
1 TABLET ORAL DAILY
Refills: 0 | COMMUNITY
Start: 2018-04-24 | End: 2018-06-12 | Stop reason: ALTCHOICE

## 2018-04-27 RX ORDER — OMEPRAZOLE 20 MG/1
1 CAPSULE, DELAYED RELEASE ORAL DAILY
Refills: 0 | COMMUNITY
Start: 2018-04-12 | End: 2021-05-09

## 2018-04-27 RX ORDER — PSYLLIUM HUSK 0.52G
1 CAPSULE ORAL DAILY
Refills: 0 | COMMUNITY
Start: 2018-04-12 | End: 2022-10-04 | Stop reason: ALTCHOICE

## 2018-04-27 RX ORDER — MONTELUKAST SODIUM 10 MG/1
1 TABLET ORAL DAILY
Refills: 0 | COMMUNITY
Start: 2018-04-24 | End: 2021-05-09

## 2018-04-27 RX ORDER — SIMVASTATIN 10 MG/1
TABLET, FILM COATED ORAL
COMMUNITY
End: 2019-09-18 | Stop reason: SDUPTHER

## 2018-04-27 NOTE — MR AVS SNAPSHOT
[address] [place] [address] [address]i Sor[address] [address] [facility] [address] [phone] Patient: Adali Campos MRN: [id number] CVN:[id number] Visit Information Date & Time Provider Department Dept. Phone Encounter #  
 4/27/2018  1:30 PM [facility] [place] [phone] [id number] Upcoming Health Maintenance Date Due  
 FOOT EXAM Q1 2/8/2018 Influenza Age 5 to Adult 8/1/2018 Pneumococcal 65+ Low/Medium Risk (2 of 2 - PPSV23) 10/11/2018 HEMOGLOBIN A1C Q6M 10/19/2018 EYE EXAM RETINAL OR DILATED Q1 10/19/2018 MICROALBUMIN Q1 4/19/2019 LIPID PANEL Q1 4/19/2019 GLAUCOMA SCREENING Q2Y 10/19/2019 COLONOSCOPY 3/22/2022 DTaP/Tdap/Td series (2 - Td) 12/16/2024 Allergies as of 4/27/2018  Review Complete On: 4/27/2018 By: Ron Nash LPN Severity Noted Reaction Type Reactions Amaryl [Glimepiride]  08/11/2015   Side Effect Vertigo Patient reported that this medications made him very dizzy and light headed. It was discontinued by Dr Boo Lua Current Immunizations  Reviewed on 10/11/2017 Name Date Tdap 12/16/2014 Not reviewed this visit You Were Diagnosed With   
  
 Codes Comments Annual physical exam    -  Primary ICD-10-CM: Z00.00 ICD-9-CM: V70.0 Scrotal abscess     ICD-10-CM: N49.2 ICD-9-CM: 608.4 Environmental allergies     ICD-10-CM: Z91.09 
ICD-9-CM: V15.09   
 Pure hypercholesterolemia     ICD-10-CM: E78.00 ICD-9-CM: 272.0 Type 2 diabetes mellitus without complication, without long-term current use of insulin (HCC)     ICD-10-CM: E11.9 ICD-9-CM: 250.00 Vitamin D deficiency     ICD-10-CM: E55.9 ICD-9-CM: 268.9 Vitals BP Pulse Temp Resp Height(growth percentile) Weight(growth percentile) 122/80 (BP 1 Location: Left arm, BP Patient Position: Sitting) 72 97.8 °F (36.6 °C) (Oral) 17 5' 5.67\" (1.668 m) 184 lb (83.5 kg) SpO2 BMI Smoking Status 99% 30 kg/m2 Former Smoker Vitals History BMI and BSA Data Body Mass Index Body Surface Area  
 30 kg/m 2 1.97 m 2 Preferred Pharmacy Pharmacy Name Phone [address]  [address] [phone] Your Updated Medication List  
  
   
This list is accurate as of 4/27/18  2:22 PM.  Always use your most recent med list.  
  
  
  
  
 FIBER THERAPY LAXATIVE (HUSK) 0.52 gram capsule Generic drug:  psyllium Take 1 Cap by mouth daily. Lancets Misc Commonly known as:  ONETOUCH ULTRASOFT LANCETS For once daily testing  
  
 metFORMIN 500 mg tablet Commonly known as:  GLUCOPHAGE  
take 1 tablet by mouth twice a day with meals  
  
 montelukast 10 mg tablet Commonly known as:  SINGULAIR Take 1 Tab by mouth daily. NITROSTAT 0.4 mg SL tablet Generic drug:  nitroglycerin  
by SubLINGual route every five (5) minutes as needed for Chest Pain. omeprazole 20 mg capsule Commonly known as:  PRILOSEC Take 1 Cap by mouth daily. ONETOUCH ULTRA TEST strip Generic drug:  glucose blood VI test strips TEST once daily  
  
 predniSONE 20 mg tablet Commonly known as:  Odalis Loth Take 1 Tab by mouth daily. sildenafil citrate 100 mg tablet Commonly known as:  VIAGRA Take 1 Tab by mouth daily as needed for up to 4 doses. ZOCOR 10 mg tablet Generic drug:  simvastatin Take  by mouth nightly. We Performed the Following  DIABETES FOOT EXAM [[facility] Custom] Patient Instructions Well Visit, Men 48 to 72: Care Instructions Your Care Instructions Physical exams can help you stay healthy. Your doctor has checked your overall health and may have suggested ways to take good care of yourself. He or she also may have recommended tests. At home, you can help prevent illness with healthy eating, regular exercise, and other steps. Follow-up care is a key part of your treatment and safety. Be sure to make and go to all appointments, and call your doctor if you are having problems. It's also a good idea to know your test results and keep a list of the medicines you take. How can you care for yourself at home? · Reach and stay at a healthy weight. This will lower your risk for many problems, such as obesity, diabetes, heart disease, and high blood pressure. · Get at least 30 minutes of exercise on most days of the week. Walking is a good choice. You also may want to do other activities, such as running, swimming, cycling, or playing tennis or team sports. · Do not smoke. Smoking can make health problems worse. If you need help quitting, talk to your doctor about stop-smoking programs and medicines. These can increase your chances of quitting for good. · Protect your skin from too much sun. When you're outdoors from 10 a.m. to 4 p.m., stay in the shade or cover up with clothing and a hat with a wide brim. Wear sunglasses that block UV rays. Even when it's cloudy, put broad-spectrum sunscreen (SPF 30 or higher) on any exposed skin. · See a dentist one or two times a year for checkups and to have your teeth cleaned. · Wear a seat belt in the car. · Limit alcohol to 2 drinks a day. Too much alcohol can cause health problems. Follow your doctor's advice about when to have certain tests. These tests can spot problems early. · Cholesterol. Your doctor will tell you how often to have this done based on your overall health and other things that can increase your risk for heart attack and stroke. · Blood pressure. Have your blood pressure checked during a routine doctor visit. Your doctor will tell you how often to check your blood pressure based on your age, your blood pressure results, and other factors.  
· Prostate exam. Talk to your doctor about whether you should have a blood test (called a PSA test) for prostate cancer. Experts disagree on whether men should have this test. Some experts recommend that you discuss the benefits and risks of the test with your doctor. · Diabetes. Ask your doctor whether you should have tests for diabetes. · Vision. Some experts recommend that you have yearly exams for glaucoma and other age-related eye problems starting at age 48. · Hearing. Tell your doctor if you notice any change in your hearing. You can have tests to find out how well you hear. · Colon cancer. You should begin tests for colon cancer at age 48. You may have one of several tests. Your doctor will tell you how often to have tests based on your age and risk. Risks include whether you already had a precancerous polyp removed from your colon or whether your parent, brother, sister, or child has had colon cancer. · Heart attack and stroke risk. At least every 4 to 6 years, you should have your risk for heart attack and stroke assessed. Your doctor uses factors such as your age, blood pressure, cholesterol, and whether you smoke or have diabetes to show what your risk for a heart attack or stroke is over the next 10 years. · Abdominal aortic aneurysm. Ask your doctor whether you should have a test to check for an aneurysm. You may need a test if you ever smoked or if your parent, brother, sister, or child has had an aneurysm. When should you call for help? Watch closely for changes in your health, and be sure to contact your doctor if you have any problems or symptoms that concern you. Where can you learn more? Go to http://indio-kavita.info/. Enter Y028 in the search box to learn more about \"Well Visit, Men 48 to 72: Care Instructions. \" Current as of: May 12, 2017 Content Version: 11.4 © 2499-6942 Healthwise, Incorporated.  Care instructions adapted under license by [employer] (which disclaims liability or warranty for this information). If you have questions about a medical condition or this instruction, always ask your healthcare professional. [address] any warranty or liability for your use of this information. Introducing [facility]! Dear Walker Medina: Thank you for requesting a ShelfX account. Our records indicate that you already have an active ShelfX account. You can access your account anytime at [email or web address] Did you know that you can access your hospital and ER discharge instructions at any time in ShelfX? You can also review all of your test results from your hospital stay or ER visit. Additional Information If you have questions, please visit the Frequently Asked Questions section of the ShelfX website at [email or web address] Remember, ShelfX is NOT to be used for urgent needs. For medical emergencies, dial 911. Now available from your iPhone and Android! Please provide this summary of care documentation to your next provider. Your primary care clinician is listed as Ronda 51. If you have any questions after today's visit, please call [phone].

## 2018-04-27 NOTE — PROGRESS NOTES
Omer Martinez is a 74 y.o. male (: 1950) presenting to address:    Chief Complaint   Patient presents with    Complete Physical       Vitals:    18 1332   BP: 122/80   Pulse: 72   Resp: 17   Temp: 97.8 °F (36.6 °C)   TempSrc: Oral   SpO2: 99%   Weight: 184 lb (83.5 kg)   Height: 5' 5.67\" (1.668 m)   PainSc:   0 - No pain       Hearing/Vision:   No exam data present    Learning Assessment:     Learning Assessment 2014   PRIMARY LEARNER Patient   HIGHEST LEVEL OF EDUCATION - PRIMARY LEARNER  GRADUATED HIGH SCHOOL OR GED   BARRIERS PRIMARY LEARNER NONE   PRIMARY LANGUAGE ENGLISH   LEARNER PREFERENCE PRIMARY VIDEOS   ANSWERED BY PATIENT   RELATIONSHIP SELF     Depression Screening:     PHQ over the last two weeks 2018   Little interest or pleasure in doing things Not at all   Feeling down, depressed or hopeless Not at all   Total Score PHQ 2 0     Fall Risk Assessment:     Fall Risk Assessment, last 12 mths 2018   Able to walk? Yes   Fall in past 12 months? No     Abuse Screening:     Abuse Screening Questionnaire 2018   Do you ever feel afraid of your partner? N   Are you in a relationship with someone who physically or mentally threatens you? N   Is it safe for you to go home? Y     Coordination of Care Questionaire:   1. Have you been to the ER, urgent care clinic since your last visit? Hospitalized since your last visit? Yes, [facility] [address]     2. Have you seen or consulted any other health care providers outside of the [address] [place] [address] since your last visit? Include any pap smears or colon screening. Yes, Dr Mary Ann Braden sent to Dr. Rah Inman general surgeon for [facility] 18    Advanced Directive:   1. Do you have an Advanced Directive? No     2. Would you like information on Advanced Directives?   No

## 2018-04-27 NOTE — PROGRESS NOTES
SUBJECTIVE:   Flor Jalloh is a 74 y.o. male presenting for his annual checkup. Past Medical History:   Diagnosis Date    CAD (coronary artery disease)     Constipation     Diabetes mellitus ([address] 8/7/2015    Diarrhea     Gastric ulcer 12/16/2014    On EGD in 2014.  Hiatal hernia 12/16/2014    On EGD, 2014. With Dr. Drew Ortiz.  History of Helicobacter pylori infection 12/16/2014    On EGD in 2014, Dr. Drew Ortiz. S/p Tx.  Hyperlipidemia     Impotence of organic origin     Lactose intolerance     Low serum testosterone level     Nocturia     Other testicular hypofunction     Scrotal abscess 4/27/2018    Sinus congestion     Stented coronary artery 09/2013.  Umbilical hernia without obstruction and without gangrene 4/27/2018    Vitamin D deficiency        Allergies: Amaryl [glimepiride]     Current Outpatient Prescriptions   Medication Sig    omeprazole (PRILOSEC) 20 mg capsule Take 1 Cap by mouth daily.  montelukast (SINGULAIR) 10 mg tablet Take 1 Tab by mouth daily.  FIBER THERAPY LAXATIVE, HUSK, 0.52 gram capsule Take 1 Cap by mouth daily.  predniSONE (DELTASONE) 20 mg tablet Take 1 Tab by mouth daily.  simvastatin (ZOCOR) 10 mg tablet Take  by mouth nightly.  metFORMIN (GLUCOPHAGE) 500 mg tablet take 1 tablet by mouth twice a day with meals    ONETOUCH ULTRA TEST strip TEST once daily    sildenafil citrate (VIAGRA) 100 mg tablet Take 1 Tab by mouth daily as needed for up to 4 doses.  Lancets (ONETOUCH ULTRASOFT LANCETS) misc For once daily testing    nitroglycerin (NITROSTAT) 0.4 mg SL tablet by SubLINGual route every five (5) minutes as needed for Chest Pain. No current facility-administered medications for this visit. ROS:  Feeling well. No dyspnea or chest pain on exertion. No abdominal pain, change in bowel habits, black or bloody stools. No urinary tract or prostatic symptoms. No neurological complaints.       OBJECTIVE:   The patient appears well, alert, oriented x 3, in no distress. Visit Vitals    /80 (BP 1 Location: Left arm, BP Patient Position: Sitting)    Pulse 72    Temp 97.8 °F (36.6 °C) (Oral)    Resp 17    Ht 5' 5.67\" (1.668 m)    Wt 184 lb (83.5 kg)    SpO2 99%    BMI 30 kg/m2       General appearance: alert, cooperative, no distress, appears stated age  Head: Normocephalic, without obvious abnormality, atraumatic  Ears: normal TM's and external ear canals AU  Throat: Lips, mucosa, and tongue normal. Teeth and gums normal  Neck: supple, symmetrical, trachea midline, no adenopathy, thyroid: not enlarged, symmetric, no tenderness/mass/nodules, no carotid bruit and no JVD  Back: symmetric, no curvature. ROM normal. No CVA tenderness. Lungs: clear to auscultation bilaterally  Heart: regular rate and rhythm, S1, S2 normal, no murmur, click, rub or gallop  Abdomen: soft, non-tender. Bowel sounds normal. No masses,  no organomegaly  Extremities: extremities normal, atraumatic, no cyanosis or edema  Pulses: 2+ and symmetric  Skin: Skin color, texture, turgor normal. No rashes or lesions  Neurological is normal, no focal findings.     Diabetic foot exam:     Left:    Filament test normal sensation with micro filament   Pulse DP: 2+ (normal)   Pulse PT: 2+ (normal)   Deformities: None  Right:    Filament test normal sensation with micro filament   Pulse DP: 2+ (normal)   Pulse PT: 2+ (normal)   Deformities: None        Lab Results   Component Value Date/Time    WBC 7.5 04/19/2018 11:28 AM    HGB 14.4 04/19/2018 11:28 AM    HCT 44.3 04/19/2018 11:28 AM    PLATELET 494 (L) 22/12/5430 11:28 AM    MCV 94.9 04/19/2018 11:28 AM         Lab Results   Component Value Date/Time    Sodium 143 04/19/2018 11:28 AM    Potassium 4.2 04/19/2018 11:28 AM    Chloride 107 04/19/2018 11:28 AM    CO2 29 04/19/2018 11:28 AM    Anion gap 7 04/19/2018 11:28 AM    Glucose 106 (H) 04/19/2018 11:28 AM    BUN 15 04/19/2018 11:28 AM    Creatinine 1.20 04/19/2018 11:28 AM    BUN/Creatinine ratio 13 04/19/2018 11:28 AM    GFR est AA >60 04/19/2018 11:28 AM    GFR est non-AA >60 04/19/2018 11:28 AM    Calcium 9.6 04/19/2018 11:28 AM         Lab Results   Component Value Date/Time    ALT (SGPT) 38 04/19/2018 11:28 AM    AST (SGOT) 25 04/19/2018 11:28 AM    Alk. phosphatase 88 04/19/2018 11:28 AM    Bilirubin, direct 0.1 04/19/2018 11:28 AM    Bilirubin, total 0.4 04/19/2018 11:28 AM         Lab Results   Component Value Date/Time    Cholesterol, total 108 04/19/2018 11:28 AM    HDL Cholesterol 28 (L) 04/19/2018 11:28 AM    LDL, calculated 20 04/19/2018 11:28 AM    VLDL, calculated 60 04/19/2018 11:28 AM    Triglyceride 300 (H) 04/19/2018 11:28 AM    CHOL/HDL Ratio 3.9 04/19/2018 11:28 AM         Lab Results   Component Value Date/Time    TSH 0.55 04/19/2018 11:28 AM    T4, Free 1.1 04/19/2018 11:28 AM         Lab Results   Component Value Date/Time    Vitamin D 25-Hydroxy 21.7 (L) 04/19/2018 11:28 AM           ASSESSMENT:   healthy adult male    Diagnoses and all orders for this visit:    1. Annual physical exam    2. Scrotal abscess    3. Environmental allergies    4. Pure hypercholesterolemia    5. Type 2 diabetes mellitus without complication, without long-term current use of insulin ([facility])  -      DIABETES FOOT EXAM    6. Vitamin D deficiency          PLAN:   begin progressive daily aerobic exercise program, follow a low fat, low cholesterol diet and continue current medications     Umbilical hernia: stable. Healing from the surgery for his scrotal abscess. Will work on losing weight. The plan was discussed with the patient. The patient verbalized understanding and is in agreement with the plan. All medication potential side effects were discussed with the patient.

## 2018-04-27 NOTE — PATIENT INSTRUCTIONS

## 2018-05-28 RX ORDER — METFORMIN HYDROCHLORIDE 500 MG/1
TABLET ORAL
Qty: 180 TAB | Refills: 1 | Status: SHIPPED | OUTPATIENT
Start: 2018-05-28 | End: 2018-11-08 | Stop reason: SDUPTHER

## 2018-06-12 ENCOUNTER — OFFICE VISIT (OUTPATIENT)
Dept: FAMILY MEDICINE CLINIC | Age: 68
End: 2018-06-12

## 2018-06-12 VITALS
BODY MASS INDEX: 28.77 KG/M2 | DIASTOLIC BLOOD PRESSURE: 70 MMHG | TEMPERATURE: 97.8 F | OXYGEN SATURATION: 97 % | WEIGHT: 179 LBS | HEIGHT: 66 IN | HEART RATE: 61 BPM | RESPIRATION RATE: 16 BRPM | SYSTOLIC BLOOD PRESSURE: 110 MMHG

## 2018-06-12 DIAGNOSIS — R42 DIZZINESS: ICD-10-CM

## 2018-06-12 DIAGNOSIS — H69.82 EUSTACHIAN TUBE DYSFUNCTION, LEFT: Primary | ICD-10-CM

## 2018-06-12 NOTE — MR AVS SNAPSHOT
[facility] 
 
 
 [address] [facility] [place] [facility] [id number] [phone] Patient: Kurtis Altman MRN: [id number] BATSHEVA:[id number] Visit Information Date & Time Provider Department Dept. Phone Encounter #  
 6/12/2018 11:00 AM Neelam Ibarra [facility] [phone] [id number] Your Appointments 8/27/2018  3:15 PM  
Follow Up with Neelam Ibarra MD  
[employer] [address] [place] [address]) Appt Note: 4 month f/u  
 [address] [place] [facility] [address] [phone]  
  
   
 [address] Upcoming Health Maintenance Date Due Influenza Age 5 to Adult 8/1/2018 Pneumococcal 65+ Low/Medium Risk (2 of 2 - PPSV23) 10/11/2018 HEMOGLOBIN A1C Q6M 10/19/2018 EYE EXAM RETINAL OR DILATED Q1 10/19/2018 MICROALBUMIN Q1 4/19/2019 LIPID PANEL Q1 4/19/2019 FOOT EXAM Q1 4/27/2019 GLAUCOMA SCREENING Q2Y 10/19/2019 COLONOSCOPY 3/22/2022 DTaP/Tdap/Td series (2 - Td) 12/16/2024 Allergies as of 6/12/2018  Review Complete On: 6/12/2018 By: Neelam Ibarra MD  
  
 Severity Noted Reaction Type Reactions Amaryl [Glimepiride]  08/11/2015   Side Effect Vertigo Patient reported that this medications made him very dizzy and light headed. It was discontinued by Dr Bob Rosas Current Immunizations  Reviewed on 10/11/2017 Name Date Tdap 12/16/2014 Not reviewed this visit You Were Diagnosed With   
  
 Codes Comments Eustachian tube dysfunction, left    -  Primary ICD-10-CM: H59.88 ICD-9-CM: 381.81 Dizziness     ICD-10-CM: G92 ICD-9-CM: 780.4 Vitals BP Pulse Temp Resp Height(growth percentile) Weight(growth percentile) 110/70 (BP 1 Location: Left arm, BP Patient Position: Sitting) 61 97.8 °F (36.6 °C) (Oral) 16 5' 5.67\" (1.668 m) 179 lb (81.2 kg) SpO2 BMI Smoking Status 97% 29.18 kg/m2 Former Smoker Vitals History BMI and BSA Data Body Mass Index Body Surface Area  
 29.18 kg/m 2 1.94 m 2 Preferred Pharmacy Pharmacy Name Phone [address] [phone] Your Updated Medication List  
  
   
This list is accurate as of 6/12/18 11:30 AM.  Always use your most recent med list.  
  
  
  
  
 FIBER THERAPY LAXATIVE (HUSK) 0.52 gram capsule Generic drug:  psyllium Take 1 Cap by mouth daily. Lancets Misc Commonly known as:  ONETOUCH ULTRASOFT LANCETS For once daily testing  
  
 metFORMIN 500 mg tablet Commonly known as:  GLUCOPHAGE  
take 1 tablet by mouth twice a day with meals  
  
 montelukast 10 mg tablet Commonly known as:  SINGULAIR Take 1 Tab by mouth daily. NITROSTAT 0.4 mg SL tablet Generic drug:  nitroglycerin  
by SubLINGual route every five (5) minutes as needed for Chest Pain. omeprazole 20 mg capsule Commonly known as:  PRILOSEC Take 1 Cap by mouth daily. ONETOUCH ULTRA TEST strip Generic drug:  glucose blood VI test strips TEST once daily  
  
 sildenafil citrate 100 mg tablet Commonly known as:  VIAGRA Take 1 Tab by mouth daily as needed for up to 4 doses. * ZOCOR 10 mg tablet Generic drug:  simvastatin Take  by mouth nightly. * simvastatin 10 mg tablet Commonly known as:  ZOCOR Take 1 Tab by mouth nightly. * Notice: This list has 2 medication(s) that are the same as other medications prescribed for you. Read the directions carefully, and ask your doctor or other care provider to review them with you. We Performed the Following REFERRAL TO AUDIOLOGY [REF7 Custom] Referral Information Referral ID Referred By Referred To  
  
 [id number] MD [address]   
   [address] [place], [address] Phone: [phone] Fax: [phone] Visits Status Start Date End Date 1 New Request 6/12/18 6/12/19 If your referral has a status of pending review or denied, additional information will be sent to support the outcome of this decision. Patient Instructions Dizziness: Care Instructions Your Care Instructions Dizziness is the feeling of unsteadiness or fuzziness in your head. It is different than having vertigo, which is a feeling that the room is spinning or that you are moving or falling. It is also different from lightheadedness, which is the feeling that you are about to faint. It can be hard to know what causes dizziness. Some people feel dizzy when they have migraine headaches. Sometimes bouts of flu can make you feel dizzy. Some medical conditions, such as heart problems or high blood pressure, can make you feel dizzy. Many medicines can cause dizziness, including medicines for high blood pressure, pain, or anxiety. If a medicine causes your symptoms, your doctor may recommend that you stop or change the medicine. If it is a problem with your heart, you may need medicine to help your heart work better. If there is no clear reason for your symptoms, your doctor may suggest watching and waiting for a while to see if the dizziness goes away on its own. Follow-up care is a key part of your treatment and safety. Be sure to make and go to all appointments, and call your doctor if you are having problems. It's also a good idea to know your test results and keep a list of the medicines you take. How can you care for yourself at home? · If your doctor recommends or prescribes medicine, take it exactly as directed. Call your doctor if you think you are having a problem with your medicine. · Do not drive while you feel dizzy. · Try to prevent falls. Steps you can take include: ¨ Using nonskid mats, adding grab bars near the tub, and using night-lights. ¨ Clearing your home so that walkways are free of anything you might trip on. ¨ Letting family and friends know that you have been feeling dizzy. This will help them know how to help you. When should you call for help? Call 911 anytime you think you may need emergency care. For example, call if: 
? · You passed out (lost consciousness). ? · You have dizziness along with symptoms of a heart attack. These may include: ¨ Chest pain or pressure, or a strange feeling in the chest. 
¨ Sweating. ¨ Shortness of breath. ¨ Nausea or vomiting. ¨ Pain, pressure, or a strange feeling in the back, neck, jaw, or upper belly or in one or both shoulders or arms. ¨ Lightheadedness or sudden weakness. ¨ A fast or irregular heartbeat. ? · You have symptoms of a stroke. These may include: 
¨ Sudden numbness, tingling, weakness, or loss of movement in your face, arm, or leg, especially on only one side of your body. ¨ Sudden vision changes. ¨ Sudden trouble speaking. ¨ Sudden confusion or trouble understanding simple statements. ¨ Sudden problems with walking or balance. ¨ A sudden, severe headache that is different from past headaches. ?Call your doctor now or seek immediate medical care if: 
? · You feel dizzy and have a fever, headache, or ringing in your ears. ? · You have new or increased nausea and vomiting. ? · Your dizziness does not go away or comes back. ? Watch closely for changes in your health, and be sure to contact your doctor if: 
? · You do not get better as expected. Where can you learn more? Go to http://indio-kavita.info/. Enter Y250 in the search box to learn more about \"Dizziness: Care Instructions. \" Current as of: March 20, 2017 Content Version: 11.4 © 4560-2159 [employer]. Care instructions adapted under license by [employer] (which disclaims liability or warranty for this information).  If you have questions about a medical condition or this instruction, always ask your healthcare professional. Giulia Posey any warranty or liability for your use of this information. Introducing [facility]! Dear Liseth Bowles: Thank you for requesting a Re[employer] account. Our records indicate that you already have an active ReefEdge account. You can access your account anytime at [email or web address] Did you know that you can access your hospital and ER discharge instructions at any time in ReefEdge? You can also review all of your test results from your hospital stay or ER visit. Additional Information If you have questions, please visit the Frequently Asked Questions section of the [employer]efEdge website at [email or web address] Remember, ReefEdge is NOT to be used for urgent needs. For medical emergencies, dial 911. Now available from your iPhone and Android! Please provide this summary of care documentation to your next provider. Your primary care clinician is listed as Ronda 51. If you have any questions after today's visit, please call [phone].

## 2018-06-12 NOTE — PATIENT INSTRUCTIONS
Dizziness: Care Instructions  Your Care Instructions  Dizziness is the feeling of unsteadiness or fuzziness in your head. It is different than having vertigo, which is a feeling that the room is spinning or that you are moving or falling. It is also different from lightheadedness, which is the feeling that you are about to faint. It can be hard to know what causes dizziness. Some people feel dizzy when they have migraine headaches. Sometimes bouts of flu can make you feel dizzy. Some medical conditions, such as heart problems or high blood pressure, can make you feel dizzy. Many medicines can cause dizziness, including medicines for high blood pressure, pain, or anxiety. If a medicine causes your symptoms, your doctor may recommend that you stop or change the medicine. If it is a problem with your heart, you may need medicine to help your heart work better. If there is no clear reason for your symptoms, your doctor may suggest watching and waiting for a while to see if the dizziness goes away on its own. Follow-up care is a key part of your treatment and safety. Be sure to make and go to all appointments, and call your doctor if you are having problems. It's also a good idea to know your test results and keep a list of the medicines you take. How can you care for yourself at home? · If your doctor recommends or prescribes medicine, take it exactly as directed. Call your doctor if you think you are having a problem with your medicine. · Do not drive while you feel dizzy. · Try to prevent falls. Steps you can take include:  ¨ Using nonskid mats, adding grab bars near the tub, and using night-lights. ¨ Clearing your home so that walkways are free of anything you might trip on. ¨ Letting family and friends know that you have been feeling dizzy. This will help them know how to help you. When should you call for help? Call 911 anytime you think you may need emergency care.  For example, call if:  ? · You passed out (lost consciousness). ? · You have dizziness along with symptoms of a heart attack. These may include:  ¨ Chest pain or pressure, or a strange feeling in the chest.  ¨ Sweating. ¨ Shortness of breath. ¨ Nausea or vomiting. ¨ Pain, pressure, or a strange feeling in the back, neck, jaw, or upper belly or in one or both shoulders or arms. ¨ Lightheadedness or sudden weakness. ¨ A fast or irregular heartbeat. ? · You have symptoms of a stroke. These may include:  ¨ Sudden numbness, tingling, weakness, or loss of movement in your face, arm, or leg, especially on only one side of your body. ¨ Sudden vision changes. ¨ Sudden trouble speaking. ¨ Sudden confusion or trouble understanding simple statements. ¨ Sudden problems with walking or balance. ¨ A sudden, severe headache that is different from past headaches. ?Call your doctor now or seek immediate medical care if:  ? · You feel dizzy and have a fever, headache, or ringing in your ears. ? · You have new or increased nausea and vomiting. ? · Your dizziness does not go away or comes back. ? Watch closely for changes in your health, and be sure to contact your doctor if:  ? · You do not get better as expected. Where can you learn more? Go to http://indio-kavita.info/. Enter K015 in the search box to learn more about \"Dizziness: Care Instructions. \"  Current as of: March 20, 2017  Content Version: 11.4  © 4096-8372 [employer]. Care instructions adapted under license by [employer] (which disclaims liability or warranty for this information). If you have questions about a medical condition or this instruction, always ask your healthcare professional. [place] [address] any warranty or liability for your use of this information.

## 2018-06-12 NOTE — PROGRESS NOTES
David La is a 74 y.o. male (: 1950) presenting to address:    Chief Complaint   Patient presents with    Ear Pain     left        Vitals:    18 1102   BP: 110/70   Pulse: 61   Resp: 16   Temp: 97.8 °F (36.6 °C)   TempSrc: Oral   SpO2: 97%   Weight: 179 lb (81.2 kg)   Height: 5' 5.67\" (1.668 m)   PainSc:   0 - No pain       Hearing/Vision:   No exam data present    Learning Assessment:     Learning Assessment 2014   PRIMARY LEARNER Patient   HIGHEST LEVEL OF EDUCATION - PRIMARY LEARNER  GRADUATED HIGH SCHOOL OR GED   BARRIERS PRIMARY LEARNER NONE   PRIMARY LANGUAGE ENGLISH   LEARNER PREFERENCE PRIMARY VIDEOS   ANSWERED BY PATIENT   RELATIONSHIP SELF     Depression Screening:     PHQ over the last two weeks 2018   Little interest or pleasure in doing things Not at all   Feeling down, depressed or hopeless Not at all   Total Score PHQ 2 0     Fall Risk Assessment:     Fall Risk Assessment, last 12 mths 2018   Able to walk? Yes   Fall in past 12 months? No     Abuse Screening:     Abuse Screening Questionnaire 2018   Do you ever feel afraid of your partner? N   Are you in a relationship with someone who physically or mentally threatens you? N   Is it safe for you to go home? Y     Coordination of Care Questionaire:   1. Have you been to the ER, urgent care clinic since your last visit? Hospitalized since your last visit? No     2. Have you seen or consulted any other health care providers outside of the [facility] since your last visit? Include any pap smears or colon screening. No     Advanced Directive:   1. Do you have an Advanced Directive? No     2.  Would you like information on Advanced Directives  No

## 2018-06-12 NOTE — PROGRESS NOTES
Assessment/Plan:    *Diagnoses and all orders for this visit:    1. Eustachian tube dysfunction, left  -     REFERRAL TO AUDIOLOGY    2. Dizziness  -     REFERRAL TO AUDIOLOGY      The plan was discussed with the patient. The patient verbalized understanding and is in agreement with the plan. All medication potential side effects were discussed with the patient.    -------------------------------------------------------------------------------------------------------------------        Kurtis Altman is a 74 y.o. male and presents with Ear Pain (left )         Subjective:  Pt here b/c he is still having issues with LT ear feeling clogged and associated dizziness. Denies any pain in the ear. He has been to 2 different ENTs and he is not any better. Symptoms are not debilitating just aggravating. Has been on different rounds of Abx but has not improved. ROS:  Constitutional: No recent weight change. No weakness/fatigue. No f/c. Skin: No rashes, change in nails/hair, itching   HENT: No HA, dizziness. No hearing loss/tinnitus. No nasal congestion/discharge. Eyes: No change in vision, double/blurred vision or eye pain/redness. Cardiovascular: No CP/palpitations. No MOE/orthopnea/PND. Respiratory: No cough/sputum, dyspnea, wheezing. Gastointestinal: No dysphagia, reflux. No n/v. No constipation/diarrhea. No melena/rectal bleeding. Genitourinary: No dysuria, urinary hesitancy, nocturia, hematuria. No incontinence. Musculoskeletal: No joint pain/stiffness. No muscle pain/tenderness. Endo: No heat/cold intolerance, no polyuria/polydypsia. Heme: No h/o anemia. No easy bleeding/bruising. Allergy/Immunology: No seasonal rhinitis. Denies frequent colds, sinus/ear infections. Neurological: No seizures/numbness/weakness. No paresthesias. Psychiatric:  No depression, anxiety. The problem list was updated as a part of today's visit.   Patient Active Problem List   Diagnosis Code    Hyperlipidemia E78.5    Lactose intolerance E73.9    Sinus congestion R09.81    CAD (coronary artery disease) I25.10    Vitamin D deficiency E55.9    Low serum testosterone level R79.89    Hiatal hernia K44.9    History of Helicobacter pylori infection Z86.19    Gastric ulcer K25.9    Impotence of organic origin N52.9    Diabetes mellitus (HCC) E11.9    Scrotal abscess T61.7    Umbilical hernia without obstruction and without gangrene K42.9       The PSH, FH were reviewed. SH:  Social History   Substance Use Topics    Smoking status: Former Smoker     Packs/day: 1.00    Smokeless tobacco: Current User    Alcohol use Yes      Comment: occasionally       Medications/Allergies:  Current Outpatient Prescriptions on File Prior to Visit   Medication Sig Dispense Refill    metFORMIN (GLUCOPHAGE) 500 mg tablet take 1 tablet by mouth twice a day with meals 180 Tab 1    simvastatin (ZOCOR) 10 mg tablet Take 1 Tab by mouth nightly. 90 Tab 2    omeprazole (PRILOSEC) 20 mg capsule Take 1 Cap by mouth daily. 0    montelukast (SINGULAIR) 10 mg tablet Take 1 Tab by mouth daily. 0    FIBER THERAPY LAXATIVE, HUSK, 0.52 gram capsule Take 1 Cap by mouth daily. 0    simvastatin (ZOCOR) 10 mg tablet Take  by mouth nightly.  ONETOUCH ULTRA TEST strip TEST once daily 300 Strip 2    sildenafil citrate (VIAGRA) 100 mg tablet Take 1 Tab by mouth daily as needed for up to 4 doses. 6 Tab 12    Lancets (ONETOUCH ULTRASOFT LANCETS) misc For once daily testing 1 Each 5    nitroglycerin (NITROSTAT) 0.4 mg SL tablet by SubLINGual route every five (5) minutes as needed for Chest Pain. No current facility-administered medications on file prior to visit. Allergies   Allergen Reactions    Amaryl [Glimepiride] Vertigo     Patient reported that this medications made him very dizzy and light headed.  It was discontinued by Dr Laine Champion Maintenance:   Health Maintenance   Topic Date Due    Influenza Age 5 to Adult  08/01/2018    Pneumococcal 65+ Low/Medium Risk (2 of 2 - PPSV23) 10/11/2018    HEMOGLOBIN A1C Q6M  10/19/2018    EYE EXAM RETINAL OR DILATED Q1  10/19/2018    MICROALBUMIN Q1  04/19/2019    LIPID PANEL Q1  04/19/2019    FOOT EXAM Q1  04/27/2019    GLAUCOMA SCREENING Q2Y  10/19/2019    COLONOSCOPY  03/22/2022    DTaP/Tdap/Td series (2 - Td) 12/16/2024    Hepatitis C Screening  Completed    ZOSTER VACCINE AGE 60>  Addressed       Objective:  Visit Vitals    /70 (BP 1 Location: Left arm, BP Patient Position: Sitting)    Pulse 61    Temp 97.8 °F (36.6 °C) (Oral)    Resp 16    Ht 5' 5.67\" (1.668 m)    Wt 179 lb (81.2 kg)    SpO2 97%    BMI 29.18 kg/m2          Nurses notes and VS reviewed. Physical Examination: General appearance - alert, well appearing, and in no distress  Ears - bilateral TM's and external ear canals normal        Labwork and Ancillary Studies:    CBC w/Diff  Lab Results   Component Value Date/Time    WBC 7.5 04/19/2018 11:28 AM    HGB 14.4 04/19/2018 11:28 AM    PLATELET 142 (L) 75/96/5460 11:28 AM         Basic Metabolic Profile  Lab Results   Component Value Date/Time    Sodium 143 04/19/2018 11:28 AM    Potassium 4.2 04/19/2018 11:28 AM    Chloride 107 04/19/2018 11:28 AM    CO2 29 04/19/2018 11:28 AM    Anion gap 7 04/19/2018 11:28 AM    Glucose 106 (H) 04/19/2018 11:28 AM    BUN 15 04/19/2018 11:28 AM    Creatinine 1.20 04/19/2018 11:28 AM    BUN/Creatinine ratio 13 04/19/2018 11:28 AM    GFR est AA >60 04/19/2018 11:28 AM    GFR est non-AA >60 04/19/2018 11:28 AM    Calcium 9.6 04/19/2018 11:28 AM         LFT  Lab Results   Component Value Date/Time    ALT (SGPT) 38 04/19/2018 11:28 AM    AST (SGOT) 25 04/19/2018 11:28 AM    Alk.  phosphatase 88 04/19/2018 11:28 AM    Bilirubin, direct 0.1 04/19/2018 11:28 AM    Bilirubin, total 0.4 04/19/2018 11:28 AM         Cholesterol  Lab Results   Component Value Date/Time    Cholesterol, total 108 04/19/2018 11:28 AM    HDL Cholesterol 28 (L) 04/19/2018 11:28 AM    LDL, calculated 20 04/19/2018 11:28 AM    Triglyceride 300 (H) 04/19/2018 11:28 AM    CHOL/HDL Ratio 3.9 04/19/2018 11:28 AM

## 2018-09-06 ENCOUNTER — OFFICE VISIT (OUTPATIENT)
Dept: FAMILY MEDICINE CLINIC | Age: 68
End: 2018-09-06

## 2018-09-06 VITALS
DIASTOLIC BLOOD PRESSURE: 64 MMHG | WEIGHT: 174 LBS | RESPIRATION RATE: 12 BRPM | HEART RATE: 71 BPM | SYSTOLIC BLOOD PRESSURE: 112 MMHG | TEMPERATURE: 97.8 F | HEIGHT: 66 IN | OXYGEN SATURATION: 96 % | BODY MASS INDEX: 27.97 KG/M2

## 2018-09-06 DIAGNOSIS — E78.00 PURE HYPERCHOLESTEROLEMIA: ICD-10-CM

## 2018-09-06 DIAGNOSIS — E11.9 TYPE 2 DIABETES MELLITUS WITHOUT COMPLICATION, WITHOUT LONG-TERM CURRENT USE OF INSULIN (HCC): Primary | ICD-10-CM

## 2018-09-06 LAB — HBA1C MFR BLD HPLC: 6 %

## 2018-09-06 NOTE — MR AVS SNAPSHOT
Willie Hernandez 
 
 
 [address] [address] [facility] [id number] [phone] Patient: Kiley Mail MRN: [id number] Visit Information Date & Time Provider Department Dept. Phone Encounter #  
 9/6/2018  3:15 PM Yamileth Hernandez [phone] [id number] Upcoming Health Maintenance Date Due Influenza Age 5 to Adult 9/12/2019* Pneumococcal 65+ Low/Medium Risk (2 of 2 - PPSV23) 10/11/2018 HEMOGLOBIN A1C Q6M 10/19/2018 EYE EXAM RETINAL OR DILATED Q1 10/19/2018 MICROALBUMIN Q1 4/19/2019 LIPID PANEL Q1 4/19/2019 FOOT EXAM Q1 4/27/2019 GLAUCOMA SCREENING Q2Y 10/19/2019 COLONOSCOPY 3/22/2022 DTaP/Tdap/Td series (2 - Td) 12/16/2024 *Topic was postponed. The date shown is not the original due date. Allergies as of 9/6/2018  Review Complete On: 9/6/2018 By: Frandy Saunders Severity Noted Reaction Type Reactions Amaryl [Glimepiride]  08/11/2015   Side Effect Vertigo Patient reported that this medications made him very dizzy and light headed. It was discontinued by Dr Theo Ornelas Current Immunizations  Reviewed on 10/11/2017 Name Date Tdap 12/16/2014 Not reviewed this visit You Were Diagnosed With   
  
 Codes Comments Type 2 diabetes mellitus without complication, without long-term current use of insulin (HCC)    -  Primary ICD-10-CM: E11.9 ICD-9-CM: 250.00 Pure hypercholesterolemia     ICD-10-CM: E78.00 ICD-9-CM: 272.0 Vitals BP Pulse Temp Resp Height(growth percentile) Weight(growth percentile) 112/64 71 97.8 °F (36.6 °C) (Oral) 12 5' 5.67\" (1.668 m) 174 lb (78.9 kg) SpO2 BMI Smoking Status 96% 28.37 kg/m2 Former Smoker BMI and BSA Data Body Mass Index Body Surface Area  
 28.37 kg/m 2 1.91 m 2 Preferred Pharmacy Pharmacy Name Phone [address] [place]  [address] [phone] Your Updated Medication List  
  
   
This list is accurate as of 9/6/18  3:55 PM.  Always use your most recent med list.  
  
  
  
  
 FIBER THERAPY LAXATIVE (HUSK) 0.52 gram capsule Generic drug:  psyllium Take 1 Cap by mouth daily. Lancets Misc Commonly known as:  ONETOUCH ULTRASOFT LANCETS For once daily testing  
  
 metFORMIN 500 mg tablet Commonly known as:  GLUCOPHAGE  
take 1 tablet by mouth twice a day with meals  
  
 montelukast 10 mg tablet Commonly known as:  SINGULAIR Take 1 Tab by mouth daily. NITROSTAT 0.4 mg SL tablet Generic drug:  nitroglycerin  
by SubLINGual route every five (5) minutes as needed for Chest Pain. omeprazole 20 mg capsule Commonly known as:  PRILOSEC Take 1 Cap by mouth daily. ONETOUCH ULTRA TEST strip Generic drug:  glucose blood VI test strips TEST once daily  
  
 sildenafil citrate 100 mg tablet Commonly known as:  VIAGRA Take 1 Tab by mouth daily as needed for up to 4 doses. * ZOCOR 10 mg tablet Generic drug:  simvastatin Take  by mouth nightly. * simvastatin 10 mg tablet Commonly known as:  ZOCOR Take 1 Tab by mouth nightly. * Notice: This list has 2 medication(s) that are the same as other medications prescribed for you. Read the directions carefully, and ask your doctor or other care provider to review them with you. We Performed the Following AMB POC HEMOGLOBIN A1C [45816 CPT(R)] Patient Instructions Learning About Meal Planning for Diabetes Why plan your meals? Meal planning can be a key part of managing diabetes. Planning meals and snacks with the right balance of carbohydrate, protein, and fat can help you keep your blood sugar at the target level you set with your doctor. You don't have to eat special foods.  You can eat what your family eats, including sweets once in a while. But you do have to pay attention to how often you eat and how much you eat of certain foods. You may want to work with a dietitian or a certified diabetes educator. He or she can give you tips and meal ideas and can answer your questions about meal planning. This health professional can also help you reach a healthy weight if that is one of your goals. What plan is right for you? Your dietitian or diabetes educator may suggest that you start with the plate format or carbohydrate counting. The plate format The plate format is a simple way to help you manage how you eat. You plan meals by learning how much space each food should take on a plate. Using the plate format helps you spread carbohydrate throughout the day. It can make it easier to keep your blood sugar level within your target range. It also helps you see if you're eating healthy portion sizes. To use the plate format, you put non-starchy vegetables on half your plate. Add meat or meat substitutes on one-quarter of the plate. Put a grain or starchy vegetable (such as brown rice or a potato) on the final quarter of the plate. You can add a small piece of fruit and some low-fat or fat-free milk or yogurt, depending on your carbohydrate goal for each meal. 
Here are some tips for using the plate format: · Make sure that you are not using an oversized plate. A 9-inch plate is best. Many restaurants use larger plates. · Get used to using the plate format at home. Then you can use it when you eat out. · Write down your questions about using the plate format. Talk to your doctor, a dietitian, or a diabetes educator about your concerns. Carbohydrate counting With carbohydrate counting, you plan meals based on the amount of carbohydrate in each food. Carbohydrate raises blood sugar higher and more quickly than any other nutrient.  It is found in desserts, breads and cereals, and fruit. It's also found in starchy vegetables such as potatoes and corn, grains such as rice and pasta, and milk and yogurt. Spreading carbohydrate throughout the day helps keep your blood sugar levels within your target range. Your daily amount depends on several things, including your weight, how active you are, which diabetes medicines you take, and what your goals are for your blood sugar levels. A registered dietitian or diabetes educator can help you plan how much carbohydrate to include in each meal and snack. A guideline for your daily amount of carbohydrate is: · 45 to 60 grams at each meal. That's about the same as 3 to 4 carbohydrate servings. · 15 to 20 grams at each snack. That's about the same as 1 carbohydrate serving. The Nutrition Facts label on packaged foods tells you how much carbohydrate is in a serving of the food. First, look at the serving size on the food label. Is that the amount you eat in a serving? All of the nutrition information on a food label is based on that serving size. So if you eat more or less than that, you'll need to adjust the other numbers. Total carbohydrate is the next thing you need to look for on the label. If you count carbohydrate servings, one serving of carbohydrate is 15 grams. For foods that don't come with labels, such as fresh fruits and vegetables, you'll need a guide that lists carbohydrate in these foods. Ask your doctor, dietitian, or diabetes educator about books or other nutrition guides you can use. If you take insulin, you need to know how many grams of carbohydrate are in a meal. This lets you know how much rapid-acting insulin to take before you eat. If you use an insulin pump, you get a constant rate of insulin during the day. So the pump must be programmed at meals to give you extra insulin to cover the rise in blood sugar after meals.  
When you know how much carbohydrate you will eat, you can take the right amount of insulin. Or, if you always use the same amount of insulin, you need to make sure that you eat the same amount of carbohydrate at meals. If you need more help to understand carbohydrate counting and food labels, ask your doctor, dietitian, or diabetes educator. How do you get started with meal planning? Here are some tips to get started: 
· Plan your meals a week at a time. Don't forget to include snacks too. · Use cookbooks or online recipes to plan several main meals. Plan some quick meals for busy nights. You also can double some recipes that freeze well. Then you can save half for other busy nights when you don't have time to cook. · Make sure you have the ingredients you need for your recipes. If you're running low on basic items, put these items on your shopping list too. · List foods that you use to make breakfasts, lunches, and snacks. List plenty of fruits and vegetables. · Post this list on the refrigerator. Add to it as you think of more things you need. · Take the list to the store to do your weekly shopping. Follow-up care is a key part of your treatment and safety. Be sure to make and go to all appointments, and call your doctor if you are having problems. It's also a good idea to know your test results and keep a list of the medicines you take. Where can you learn more? Go to http://indio-kavita.info/. Earney Gottron in the search box to learn more about \"Learning About Meal Planning for Diabetes. \" Current as of: December 7, 2017 Content Version: 11.7 © 7430-7336 [employer]. Care instructions adapted under license by [employer] (which disclaims liability or warranty for this information). If you have questions about a medical condition or this instruction, always ask your healthcare professional. [address] any warranty or liability for your use of this information. Introducing [facility]! Dear Em Hernandez: Thank you for requesting a PÃºbli[employer] account. Our records indicate that you already have an active PÃ[employer] account. You can access your account anytime at [email or web address]. [email or web address] Did you know that you can access your hospital and ER discharge instructions at any time in PÃºbliKo? You can also review all of your test results from your hospital stay or ER visit. Additional Information If you have questions, please visit the Frequently Asked Questions section of the PÃº[employer]liKo website at [email or web address]. [email or web address] Remember, PÃºbliKo is NOT to be used for urgent needs. For medical emergencies, dial 911. Now available from your iPhone and Android! Please provide this summary of care documentation to your next provider. Your primary care clinician is listed as Ronda 51. If you have any questions after today's visit, please call [phone].

## 2018-09-06 NOTE — PATIENT INSTRUCTIONS

## 2018-09-06 NOTE — PROGRESS NOTES
Assessment/Plan: *Diagnoses and all orders for this visit: 
 
1. Type 2 diabetes mellitus without complication, without long-term current use of insulin ([address] -     AMB POC HEMOGLOBIN A1C 
-     HEMOGLOBIN A1C W/O EAG; Future -     METABOLIC PANEL, BASIC; Future 2. Pure hypercholesterolemia -     LIPID PANEL; Future 
-     HEPATIC FUNCTION PANEL; Future F/u 4 months. BW prior. The plan was discussed with the patient. The patient verbalized understanding and is in agreement with the plan. All medication potential side effects were discussed with the patient. 
 
------------------------------------------------------------------------------------------------------------------- Jaciel Ovalle is a 79 y.o. male and presents with Follow-up Subjective: 
Pt here for f/u. Has been doing really well. The dizziness he was having has resolved, on its own. He did not need to see the audiologist. 
 
DM: well controlled, A1c 6.0%. Great! HLD: has been stable. ROS: 
Review of Systems - Negative in all systems. The problem list was updated as a part of today's visit. Patient Active Problem List  
Diagnosis Code  Hyperlipidemia E78.5  Lactose intolerance E73.9  Sinus congestion R09.81  
 CAD (coronary artery disease) I25.10  Vitamin D deficiency E55.9  Low serum testosterone level R79.89  
 Hiatal hernia K44.9  History of Helicobacter pylori infection Z86.19  
 Gastric ulcer K25.9  Impotence of organic origin N52.9  Diabetes mellitus ([address] E11.9  Scrotal abscess N49.2  Umbilical hernia without obstruction and without gangrene K42.9 The PSH, FH were reviewed. SH: Social History Substance Use Topics  Smoking status: Former Smoker Packs/day: 1.00  Smokeless tobacco: Current User  Alcohol use Yes Comment: occasionally Medications/Allergies: 
Current Outpatient Prescriptions on File Prior to Visit Medication Sig Dispense Refill  metFORMIN (GLUCOPHAGE) 500 mg tablet take 1 tablet by mouth twice a day with meals 180 Tab 1  
 simvastatin (ZOCOR) 10 mg tablet Take 1 Tab by mouth nightly. 90 Tab 2  
 omeprazole (PRILOSEC) 20 mg capsule Take 1 Cap by mouth daily. 0  
 montelukast (SINGULAIR) 10 mg tablet Take 1 Tab by mouth daily. 0  
 FIBER THERAPY LAXATIVE, HUSK, 0.52 gram capsule Take 1 Cap by mouth daily. 0  
 ONETOUCH ULTRA TEST strip TEST once daily 300 Strip 2  
 sildenafil citrate (VIAGRA) 100 mg tablet Take 1 Tab by mouth daily as needed for up to 4 doses. 6 Tab 12  Lancets (ONETOUCH ULTRASOFT LANCETS) misc For once daily testing 1 Each 5  
 nitroglycerin (NITROSTAT) 0.4 mg SL tablet by SubLINGual route every five (5) minutes as needed for Chest Pain.  simvastatin (ZOCOR) 10 mg tablet Take  by mouth nightly. No current facility-administered medications on file prior to visit. Allergies Allergen Reactions  Amaryl [Glimepiride] Vertigo Patient reported that this medications made him very dizzy and light headed. It was discontinued by Dr [facility] Health Maintenance:  
Health Maintenance Topic Date Due  Influenza Age 5 to Adult  09/12/2019 (Originally 8/1/2018)  Pneumococcal 65+ Low/Medium Risk (2 of 2 - PPSV23) 10/11/2018  HEMOGLOBIN A1C Q6M  10/19/2018  
 EYE EXAM RETINAL OR DILATED Q1  10/19/2018  MICROALBUMIN Q1  04/19/2019  LIPID PANEL Q1  04/19/2019  
 FOOT EXAM Q1  04/27/2019  GLAUCOMA SCREENING Q2Y  10/19/2019  COLONOSCOPY  03/22/2022  DTaP/Tdap/Td series (2 - Td) 12/16/2024  Hepatitis C Screening  Completed  ZOSTER VACCINE AGE 60>  Addressed Objective: 
Visit Vitals  /64  Pulse 71  Temp 97.8 °F (36.6 °C) (Oral)  Resp 12  Ht 5' 5.67\" (1.668 m)  Wt 174 lb (78.9 kg)  SpO2 96%  BMI 28.37 kg/m2 Nurses notes and VS reviewed. Physical Examination: General appearance - alert, well appearing, and in no distress Chest - clear to auscultation, no wheezes, rales or rhonchi, symmetric air entry Heart - normal rate, regular rhythm, normal S1, S2, no murmurs, rubs, clicks or gallops Labwork and Ancillary Studies: CBC w/Diff Lab Results Component Value Date/Time WBC 7.5 04/19/2018 11:28 AM  
 HGB 14.4 04/19/2018 11:28 AM  
 PLATELET 178 (L) 08/39/3176 11:28 AM  
  
 
 Basic Metabolic Profile Lab Results Component Value Date/Time Sodium 143 04/19/2018 11:28 AM  
 Potassium 4.2 04/19/2018 11:28 AM  
 Chloride 107 04/19/2018 11:28 AM  
 CO2 29 04/19/2018 11:28 AM  
 Anion gap 7 04/19/2018 11:28 AM  
 Glucose 106 (H) 04/19/2018 11:28 AM  
 BUN 15 04/19/2018 11:28 AM  
 Creatinine 1.20 04/19/2018 11:28 AM  
 BUN/Creatinine ratio 13 04/19/2018 11:28 AM  
 GFR est AA >60 04/19/2018 11:28 AM  
 GFR est non-AA >60 04/19/2018 11:28 AM  
 Calcium 9.6 04/19/2018 11:28 AM  
  
  
LFT Lab Results Component Value Date/Time ALT (SGPT) 38 04/19/2018 11:28 AM  
 AST (SGOT) 25 04/19/2018 11:28 AM  
 Alk. phosphatase 88 04/19/2018 11:28 AM  
 Bilirubin, direct 0.1 04/19/2018 11:28 AM  
 Bilirubin, total 0.4 04/19/2018 11:28 AM  
 
 
 
Cholesterol Lab Results Component Value Date/Time  Cholesterol, total 108 04/19/2018 11:28 AM  
 HDL Cholesterol 28 (L) 04/19/2018 11:28 AM  
 LDL, calculated 20 04/19/2018 11:28 AM  
 Triglyceride 300 (H) 04/19/2018 11:28 AM  
 CHOL/HDL Ratio 3.9 04/19/2018 11:28 AM

## 2018-09-06 NOTE — PROGRESS NOTES
Osmin Barba is a 79 y.o. male presents in office to Chief Complaint Patient presents with  Follow-up Health Maintenance Due Topic Date Due  Influenza Age 5 to Adult  08/01/2018 1. Have you been to the ER, urgent care clinic since your last visit? Hospitalized since your last visit? No 
 
2. Have you seen or consulted any other health care providers outside of the [address] [place] [address] since your last visit? Include any pap smears or colon screening. No 
 
Do you have an [facility]? No and declines information. Visit Vitals  /64  Pulse 71  Temp 97.8 °F (36.6 °C) (Oral)  Resp 12  Ht 5' 5.67\" (1.668 m)  Wt 174 lb (78.9 kg)  SpO2 96%  BMI 28.37 kg/m2 Pt declines flu vaccine.

## 2019-01-15 ENCOUNTER — OFFICE VISIT (OUTPATIENT)
Dept: FAMILY MEDICINE CLINIC | Age: 69
End: 2019-01-15

## 2019-01-15 VITALS
HEART RATE: 63 BPM | BODY MASS INDEX: 27.97 KG/M2 | RESPIRATION RATE: 18 BRPM | TEMPERATURE: 98.2 F | HEIGHT: 66 IN | OXYGEN SATURATION: 99 % | DIASTOLIC BLOOD PRESSURE: 78 MMHG | SYSTOLIC BLOOD PRESSURE: 124 MMHG | WEIGHT: 174 LBS

## 2019-01-15 DIAGNOSIS — E78.00 PURE HYPERCHOLESTEROLEMIA: ICD-10-CM

## 2019-01-15 DIAGNOSIS — E11.9 TYPE 2 DIABETES MELLITUS WITHOUT COMPLICATION, WITHOUT LONG-TERM CURRENT USE OF INSULIN (HCC): Primary | ICD-10-CM

## 2019-01-15 LAB — HBA1C MFR BLD HPLC: 6 %

## 2019-01-15 NOTE — PATIENT INSTRUCTIONS
Counting Carbohydrates: Care Instructions Your Care Instructions You don't have to eat special foods when you have diabetes. You just have to be careful to eat healthy foods. Carbohydrates (carbs) raise blood sugar higher and quicker than any other nutrient. Carbs are found in desserts, breads and cereals, and fruit. They're also in starchy vegetables. These include potatoes, corn, and grains such as rice and pasta. Carbs are also in milk and yogurt. The more carbs you eat at one time, the higher your blood sugar will rise. Spreading carbs all through the day helps keep your blood sugar levels within your target range. Counting carbs is one of the best ways to keep your blood sugar under control. If you use insulin, counting carbs helps you match the right amount of insulin to the number of grams of carbs in a meal. Then you can change your diet and insulin dose as needed. Testing your blood sugar several times a day can help you learn how carbs affect your blood sugar. A registered dietitian or certified diabetes educator can help you plan meals and snacks. Follow-up care is a key part of your treatment and safety. Be sure to make and go to all appointments, and call your doctor if you are having problems. It's also a good idea to know your test results and keep a list of the medicines you take. How can you care for yourself at home? Know your daily amount of carbohydrates Your daily amount depends on several things, such as your weight, how active you are, which diabetes medicines you take, and what your goals are for your blood sugar levels. A registered dietitian or certified diabetes educator can help you plan how many carbs to include in each meal and snack. For most adults, a guideline for the daily amount of carbs is: · 45 to 60 grams at each meal. That's about the same as 3 to 4 carbohydrate servings. · 15 to 20 grams at each snack. That's about the same as 1 carbohydrate serving. Count carbs Counting carbs lets you know how much rapid-acting insulin to take before you eat. If you use an insulin pump, you get a constant rate of insulin during the day. So the pump must be programmed at meals. This gives you extra insulin to cover the rise in blood sugar after meals. If you take insulin: 
· Learn your own insulin-to-carb ratio. You and your diabetes health professional will figure out the ratio. You can do this by testing your blood sugar after meals. For example, you may need a certain amount of insulin for every 15 grams of carbs. · Add up the carb grams in a meal. Then you can figure out how many units of insulin to take based on your insulin-to-carb ratio. · Exercise lowers blood sugar. You can use less insulin than you would if you were not doing exercise. Keep in mind that timing matters. If you exercise within 1 hour after a meal, your body may need less insulin for that meal than it would if you exercised 3 hours after the meal. Test your blood sugar to find out how exercise affects your need for insulin. If you do or don't take insulin: 
· Look at labels on packaged foods. This can tell you how many carbs are in a serving. You can also use guides from the American Diabetes Association. · Be aware of portions, or serving sizes. If a package has two servings and you eat the whole package, you need to double the number of grams of carbohydrate listed for one serving. · Protein, fat, and fiber do not raise blood sugar as much as carbs do. If you eat a lot of these nutrients in a meal, your blood sugar will rise more slowly than it would otherwise. Eat from all food groups · Eat at least three meals a day. · Plan meals to include food from all the food groups. The food groups include grains, fruits, dairy, proteins, and vegetables. · Talk to your dietitian or diabetes educator about ways to add limited amounts of sweets into your meal plan. · If you drink alcohol, talk to your doctor. It may not be recommended when you are taking certain diabetes medicines. Where can you learn more? Go to http://indio-kavita.info/. Enter X580 in the search box to learn more about \"Counting Carbohydrates: Care Instructions. \" Current as of: December 7, 2017 Content Version: 11.8 © 3134-0392 [employer]. Care instructions adapted under license by [employer] (which disclaims liability or warranty for this information). If you have questions about a medical condition or this instruction, always ask your healthcare professional. [place] [address] any warranty or liability for your use of this information.

## 2019-01-15 NOTE — PROGRESS NOTES
Esperanza Teran is a 79 y.o. male (: 1950) presenting to address: Chief Complaint Patient presents with  Diabetes  
  patient declined flu shot Vitals:  
 01/15/19 1508 BP: 124/78 Pulse: 63 Resp: 18 Temp: 98.2 °F (36.8 °C) TempSrc: Oral  
SpO2: 99% Weight: 174 lb (78.9 kg) Height: 5' 5.67\" (1.668 m) PainSc:   0 - No pain Hearing/Vision: No exam data present Learning Assessment:  
 
Learning Assessment 2014 PRIMARY LEARNER Patient HIGHEST LEVEL OF EDUCATION - PRIMARY LEARNER  GRADUATED HIGH SCHOOL OR GED  
BARRIERS PRIMARY LEARNER NONE PRIMARY LANGUAGE ENGLISH  
LEARNER PREFERENCE PRIMARY VIDEOS  
ANSWERED BY PATIENT  
RELATIONSHIP SELF Depression Screening: PHQ over the last two weeks 1/15/2019 Little interest or pleasure in doing things Not at all Feeling down, depressed, irritable, or hopeless Not at all Total Score PHQ 2 0 Fall Risk Assessment:  
 
Fall Risk Assessment, last 12 mths 1/15/2019 Able to walk? Yes Fall in past 12 months? No  
 
Abuse Screening:  
 
Abuse Screening Questionnaire 2018 Do you ever feel afraid of your partner? Juan Luis Florida Are you in a relationship with someone who physically or mentally threatens you? Juan Luis Florida Is it safe for you to go home? Zuleika Briceno Coordination of Care Questionaire: 1. Have you been to the ER, urgent care clinic since your last visit? Hospitalized since your last visit? [facility] 
 
2. Have you seen or consulted any other health care providers outside of the [address] [place] [address] since your last visit? Include any pap smears or colon screening. NO Advanced Directive: 1. Do you have an Advanced Directive? NO 
 
2. Would you like information on Advanced Directives?  NO

## 2019-01-15 NOTE — PROGRESS NOTES
Assessment/Plan: *Diagnoses and all orders for this visit: 
 
1. Type 2 diabetes mellitus without complication, without long-term current use of insulin ([facility] [address] [address] -     AMB POC HEMOGLOBIN A1C 2. Pure hypercholesterolemia Pt will return for other labs this week. Did not do labs prior to visit as requested. Physical in 4 months. Full labs. The plan was discussed with the patient. The patient verbalized understanding and is in agreement with the plan. All medication potential side effects were discussed with the patient. 
 
------------------------------------------------------------------------------------------------------------------- Татьяна Ny is a 79 y.o. male and presents with Diabetes (patient declined flu shot) Subjective: 
Pt here for f/u. Has been doing well. DM: A1c well controlled, at 6.0%. HLD:  Needs new labs done as the TGs were elevated last BW. ROS: 
Review of Systems - Negative The problem list was updated as a part of today's visit. Patient Active Problem List  
Diagnosis Code  Hyperlipidemia E78.5  Lactose intolerance E73.9  Sinus congestion R09.81  
 CAD (coronary artery disease) I25.10  Vitamin D deficiency E55.9  Low serum testosterone level R79.89  
 Hiatal hernia K44.9  History of Helicobacter pylori infection Z86.19  
 Gastric ulcer K25.9  Impotence of organic origin N52.9  Diabetes mellitus ([facility] [address] E11.9  Scrotal abscess N49.2  Umbilical hernia without obstruction and without gangrene K42.9 The PSH, FH were reviewed. SH: Social History Tobacco Use  Smoking status: Former Smoker Packs/day: 1.00  Smokeless tobacco: Current User Substance Use Topics  Alcohol use: Yes Comment: occasionally  Drug use: No  
 
 
Medications/Allergies: 
Current Outpatient Medications on File Prior to Visit Medication Sig Dispense Refill  metFORMIN (GLUCOPHAGE) 500 mg tablet take 1 tablet by mouth twice a day with meals 180 Tab 0  
 ONETOUCH ULTRA BLUE TEST STRIP strip TEST once daily 300 Strip 0  
 simvastatin (ZOCOR) 10 mg tablet Take 1 Tab by mouth nightly. 90 Tab 2  
 omeprazole (PRILOSEC) 20 mg capsule Take 1 Cap by mouth daily. 0  
 montelukast (SINGULAIR) 10 mg tablet Take 1 Tab by mouth daily. 0  
 FIBER THERAPY LAXATIVE, HUSK, 0.52 gram capsule Take 1 Cap by mouth daily. 0  
 simvastatin (ZOCOR) 10 mg tablet Take  by mouth nightly.  sildenafil citrate (VIAGRA) 100 mg tablet Take 1 Tab by mouth daily as needed for up to 4 doses. 6 Tab 12  Lancets (ONETOUCH ULTRASOFT LANCETS) misc For once daily testing 1 Each 5  
 nitroglycerin (NITROSTAT) 0.4 mg SL tablet by SubLINGual route every five (5) minutes as needed for Chest Pain. No current facility-administered medications on file prior to visit. Allergies Allergen Reactions  Amaryl [Glimepiride] Vertigo Patient reported that this medications made him very dizzy and light headed. It was discontinued by Dr Gorge Krueger Health Maintenance:  
Health Maintenance Topic Date Due  Shingrix Vaccine Age 50> (1 of 2) 08/29/2002  Pneumococcal 65+ Low/Medium Risk (2 of 2 - PPSV23) 10/11/2018  Influenza Age 5 to Adult  09/12/2019 (Originally 8/1/2018)  HEMOGLOBIN A1C Q6M  03/06/2019  MICROALBUMIN Q1  04/19/2019  LIPID PANEL Q1  04/19/2019  
 FOOT EXAM Q1  04/27/2019  GLAUCOMA SCREENING Q2Y  10/19/2019  
 EYE EXAM RETINAL OR DILATED  10/19/2019  COLONOSCOPY  03/22/2022  DTaP/Tdap/Td series (2 - Td) 12/16/2024  Hepatitis C Screening  Completed Objective: 
Visit Vitals /78 (BP 1 Location: Left arm, BP Patient Position: Sitting) Pulse 63 Temp 98.2 °F (36.8 °C) (Oral) Resp 18 Ht 5' 5.67\" (1.668 m) Wt 174 lb (78.9 kg) SpO2 99% BMI 28.37 kg/m² Nurses notes and VS reviewed. Physical Examination: General appearance - alert, well appearing, and in no distress Chest - clear to auscultation, no wheezes, rales or rhonchi, symmetric air entry Heart - normal rate, regular rhythm, normal S1, S2, no murmurs, rubs, clicks or gallops Abdomen - soft, nontender, nondistended, no masses or organomegaly Labwork and Ancillary Studies: CBC w/Diff Lab Results Component Value Date/Time WBC 7.5 04/19/2018 11:28 AM  
 HGB 14.4 04/19/2018 11:28 AM  
 PLATELET 426 (L) 96/96/9736 11:28 AM  
  
 
 Basic Metabolic Profile Lab Results Component Value Date/Time Sodium 143 04/19/2018 11:28 AM  
 Potassium 4.2 04/19/2018 11:28 AM  
 Chloride 107 04/19/2018 11:28 AM  
 CO2 29 04/19/2018 11:28 AM  
 Anion gap 7 04/19/2018 11:28 AM  
 Glucose 106 (H) 04/19/2018 11:28 AM  
 BUN 15 04/19/2018 11:28 AM  
 Creatinine 1.20 04/19/2018 11:28 AM  
 BUN/Creatinine ratio 13 04/19/2018 11:28 AM  
 GFR est AA >60 04/19/2018 11:28 AM  
 GFR est non-AA >60 04/19/2018 11:28 AM  
 Calcium 9.6 04/19/2018 11:28 AM  
  
  
LFT Lab Results Component Value Date/Time ALT (SGPT) 38 04/19/2018 11:28 AM  
 AST (SGOT) 25 04/19/2018 11:28 AM  
 Alk. phosphatase 88 04/19/2018 11:28 AM  
 Bilirubin, direct 0.1 04/19/2018 11:28 AM  
 Bilirubin, total 0.4 04/19/2018 11:28 AM  
 
 
 
Cholesterol Lab Results Component Value Date/Time  Cholesterol, total 108 04/19/2018 11:28 AM  
 HDL Cholesterol 28 (L) 04/19/2018 11:28 AM  
 LDL, calculated 20 04/19/2018 11:28 AM  
 Triglyceride 300 (H) 04/19/2018 11:28 AM  
 CHOL/HDL Ratio 3.9 04/19/2018 11:28 AM

## 2019-01-16 DIAGNOSIS — E78.00 PURE HYPERCHOLESTEROLEMIA: ICD-10-CM

## 2019-01-16 DIAGNOSIS — E11.9 TYPE 2 DIABETES MELLITUS WITHOUT COMPLICATION, WITHOUT LONG-TERM CURRENT USE OF INSULIN (HCC): ICD-10-CM

## 2019-01-16 LAB
A-G RATIO,AGRAT: 1.4 RATIO (ref 1.1–2.6)
ALBUMIN SERPL-MCNC: 4.4 G/DL (ref 3.5–5)
ALP SERPL-CCNC: 78 U/L (ref 40–125)
ALT SERPL-CCNC: 12 U/L (ref 5–40)
ANION GAP SERPL CALC-SCNC: 13 MMOL/L
AST SERPL W P-5'-P-CCNC: 10 U/L (ref 10–37)
BILIRUB SERPL-MCNC: 0.3 MG/DL (ref 0.2–1.2)
BILIRUBIN, DIRECT,CBIL: <0.2 MG/DL (ref 0–0.3)
BUN SERPL-MCNC: 17 MG/DL (ref 6–22)
CALCIUM SERPL-MCNC: 9.2 MG/DL (ref 8.4–10.4)
CHLORIDE SERPL-SCNC: 106 MMOL/L (ref 98–110)
CHOLEST SERPL-MCNC: 151 MG/DL (ref 110–200)
CO2 SERPL-SCNC: 26 MMOL/L (ref 20–32)
CREAT SERPL-MCNC: 1.1 MG/DL (ref 0.8–1.6)
GFRAA, 66117: >60
GFRNA, 66118: >60
GLOBULIN,GLOB: 3.1 G/DL (ref 2–4)
GLUCOSE SERPL-MCNC: 116 MG/DL (ref 70–99)
HDLC SERPL-MCNC: 3.8 MG/DL (ref 0–5)
HDLC SERPL-MCNC: 40 MG/DL (ref 40–59)
LDLC SERPL CALC-MCNC: 85 MG/DL (ref 50–99)
POTASSIUM SERPL-SCNC: 4.3 MMOL/L (ref 3.5–5.5)
PROT SERPL-MCNC: 7.5 G/DL (ref 6.2–8.1)
SODIUM SERPL-SCNC: 145 MMOL/L (ref 133–145)
TRIGL SERPL-MCNC: 128 MG/DL (ref 40–149)
VLDLC SERPL CALC-MCNC: 26 MG/DL (ref 8–30)

## 2019-02-05 RX ORDER — SIMVASTATIN 10 MG/1
TABLET, FILM COATED ORAL
Qty: 90 TAB | Refills: 2 | Status: SHIPPED | OUTPATIENT
Start: 2019-02-05 | End: 2020-09-15

## 2019-03-12 RX ORDER — METFORMIN HYDROCHLORIDE 500 MG/1
TABLET ORAL
Qty: 180 TAB | Refills: 0 | Status: SHIPPED | OUTPATIENT
Start: 2019-03-12 | End: 2019-03-12 | Stop reason: SDUPTHER

## 2019-03-12 RX ORDER — METFORMIN HYDROCHLORIDE 500 MG/1
TABLET ORAL
Qty: 180 TAB | Refills: 0 | Status: SHIPPED | OUTPATIENT
Start: 2019-03-12 | End: 2019-03-13 | Stop reason: SDUPTHER

## 2019-03-13 ENCOUNTER — TELEPHONE (OUTPATIENT)
Dept: FAMILY MEDICINE CLINIC | Age: 69
End: 2019-03-13

## 2019-03-13 RX ORDER — METFORMIN HYDROCHLORIDE 500 MG/1
TABLET ORAL
Qty: 180 TAB | Refills: 1 | Status: SHIPPED | OUTPATIENT
Start: 2019-03-13 | End: 2019-09-18 | Stop reason: SDUPTHER

## 2019-03-13 NOTE — TELEPHONE ENCOUNTER
Called to ask patient which pharmacy he wants his medication sent to, the current one on file is [facility] on [address] , and we received notification that that store has closed and merged with the [facility] on [facility].

## 2019-03-13 NOTE — PROGRESS NOTES
Attempt was made 3 times to send metformin Rx to pt's [facility]) but kept getting rejected. Will try faxing and notify pt.

## 2019-03-14 NOTE — TELEPHONE ENCOUNTER
Pt called back and he would like it sent to [facility] on [facility].

## 2019-05-15 ENCOUNTER — OFFICE VISIT (OUTPATIENT)
Dept: FAMILY MEDICINE CLINIC | Age: 69
End: 2019-05-15

## 2019-05-15 VITALS
OXYGEN SATURATION: 96 % | WEIGHT: 179 LBS | DIASTOLIC BLOOD PRESSURE: 70 MMHG | SYSTOLIC BLOOD PRESSURE: 110 MMHG | HEART RATE: 72 BPM | RESPIRATION RATE: 16 BRPM | TEMPERATURE: 97.7 F | BODY MASS INDEX: 28.77 KG/M2 | HEIGHT: 66 IN

## 2019-05-15 DIAGNOSIS — R07.89 CHEST DISCOMFORT: ICD-10-CM

## 2019-05-15 DIAGNOSIS — E11.9 TYPE 2 DIABETES MELLITUS WITHOUT COMPLICATION, WITHOUT LONG-TERM CURRENT USE OF INSULIN (HCC): ICD-10-CM

## 2019-05-15 DIAGNOSIS — E78.00 PURE HYPERCHOLESTEROLEMIA: ICD-10-CM

## 2019-05-15 DIAGNOSIS — Z12.5 PROSTATE CANCER SCREENING: ICD-10-CM

## 2019-05-15 DIAGNOSIS — Z00.00 ANNUAL PHYSICAL EXAM: ICD-10-CM

## 2019-05-15 DIAGNOSIS — Z00.00 ANNUAL PHYSICAL EXAM: Primary | ICD-10-CM

## 2019-05-15 NOTE — PROGRESS NOTES
Jasmyn Herron is a 79 y.o. male (: 1950) presenting to address: Chief Complaint Patient presents with  Complete Physical  
 Abdominal Pain  
  started yesterday afternoon better today  Gas Vitals:  
 05/15/19 1450 BP: 110/70 Pulse: 72 Resp: 16 Temp: 97.7 °F (36.5 °C) TempSrc: Oral  
SpO2: 96% Weight: 179 lb (81.2 kg) Height: 5' 5.67\" (1.668 m) PainSc:   5 PainLoc: Back Hearing/Vision: No exam data present Learning Assessment:  
 
Learning Assessment 2014 PRIMARY LEARNER Patient HIGHEST LEVEL OF EDUCATION - PRIMARY LEARNER  GRADUATED HIGH SCHOOL OR GED  
BARRIERS PRIMARY LEARNER NONE PRIMARY LANGUAGE ENGLISH  
LEARNER PREFERENCE PRIMARY VIDEOS  
ANSWERED BY PATIENT  
RELATIONSHIP SELF Depression Screening:  
 
3 most recent PHQ Screens 1/15/2019 Little interest or pleasure in doing things Not at all Feeling down, depressed, irritable, or hopeless Not at all Total Score PHQ 2 0 Fall Risk Assessment:  
 
Fall Risk Assessment, last 12 mths 5/15/2019 Able to walk? Yes Fall in past 12 months? No  
 
Abuse Screening:  
 
Abuse Screening Questionnaire 2018 Do you ever feel afraid of your partner? Ariadne Host Are you in a relationship with someone who physically or mentally threatens you? Ariadne Host Is it safe for you to go home? Katiuska Gentile Coordination of Care Questionaire: 1. Have you been to the ER, urgent care clinic since your last visit? Hospitalized since your last visit? NO 
 
2. Have you seen or consulted any other health care providers outside of the [address] [place] [address] since your last visit? Include any pap smears or colon screening. NO Advanced Directive: 1. Do you have an Advanced Directive? NO 
 
2. Would you like information on Advanced Directives?  NO

## 2019-05-15 NOTE — PROGRESS NOTES
SUBJECTIVE:  
Giovanni Hirsch is a 79 y.o. male presenting for his annual checkup. Has been having a lot of gas, had an episode of upset stomach, had diarrhea, felt some abd pain. Happened after a meal from [employer]. He feels better now, diarrhea has stopped. Had some reflux as well with discomfort in the chest. 
 
Recently saw the eye doctor and was diagnosed with floaters. He went on a cruise in March. Prior tot his, was having LT shoulder pain. Had to drive to [facility] and initially felt like the shoulder was worse. Now the pain has resolved. Given his cardiac hx, he is concerned. He follows with his cardiologist regularly. Past Medical History:  
Diagnosis Date  CAD (coronary artery disease)  Constipation  Diabetes mellitus ([address] 8/7/2015  Diarrhea  Gastric ulcer 12/16/2014 On EGD in 2014.  Hiatal hernia 12/16/2014 On EGD, 2014. With Dr. Lorena Meadows.  History of Helicobacter pylori infection 12/16/2014 On EGD in 2014, Dr. Lorena Meadows. S/p Tx.  Hyperlipidemia  Impotence of organic origin  Lactose intolerance  Low serum testosterone level  Nocturia  Other testicular hypofunction  Scrotal abscess 4/27/2018  Sinus congestion  Stented coronary artery 09/2013.  Umbilical hernia without obstruction and without gangrene 4/27/2018  Vitamin D deficiency Allergies: Amaryl [glimepiride] Current Outpatient Medications Medication Sig  
 metFORMIN (GLUCOPHAGE) 500 mg tablet take 1 tablet by mouth twice a day with food  simvastatin (ZOCOR) 10 mg tablet take 1 tablet by mouth NIGHTLY  omeprazole (PRILOSEC) 20 mg capsule Take 1 Cap by mouth daily.  montelukast (SINGULAIR) 10 mg tablet Take 1 Tab by mouth daily.  FIBER THERAPY LAXATIVE, HUSK, 0.52 gram capsule Take 1 Cap by mouth daily.  nitroglycerin (NITROSTAT) 0.4 mg SL tablet by SubLINGual route every five (5) minutes as needed for Chest Pain.  metFORMIN (GLUCOPHAGE) 500 mg tablet take 1 tablet by mouth twice a day with food  ONETOUCH ULTRA BLUE TEST STRIP strip TEST once daily  simvastatin (ZOCOR) 10 mg tablet Take  by mouth nightly.  sildenafil citrate (VIAGRA) 100 mg tablet Take 1 Tab by mouth daily as needed for up to 4 doses.  Lancets (ONETOUCH ULTRASOFT LANCETS) misc For once daily testing No current facility-administered medications for this visit. ROS:  Feeling well. No dyspnea or chest pain on exertion. No abdominal pain, change in bowel habits, black or bloody stools. No urinary tract or prostatic symptoms. No neurological complaints. OBJECTIVE: The patient appears well, alert, oriented x 3, in no distress. Visit Vitals /70 (BP 1 Location: Left arm, BP Patient Position: Sitting) Pulse 72 Temp 97.7 °F (36.5 °C) (Oral) Resp 16 Ht 5' 5.67\" (1.668 m) Wt 179 lb (81.2 kg) SpO2 96% BMI 29.18 kg/m² General appearance: alert, cooperative, no distress, appears stated age Head: Normocephalic, without obvious abnormality, atraumatic Ears: normal TM's and external ear canals AU Throat: Lips, mucosa, and tongue normal. Teeth and gums normal 
Neck: supple, symmetrical, trachea midline, no adenopathy, thyroid: not enlarged, symmetric, no tenderness/mass/nodules, no carotid bruit and no JVD Back: symmetric, no curvature. ROM normal. No CVA tenderness. Lungs: clear to auscultation bilaterally Heart: regular rate and rhythm, S1, S2 normal, no murmur, click, rub or gallop Abdomen: soft, non-tender. Bowel sounds normal. No masses,  no organomegaly Extremities: extremities normal, atraumatic, no cyanosis or edema Pulses: 2+ and symmetric Skin: Skin color, texture, turgor normal. No rashes or lesions Neurological is normal, no focal findings. Diabetic foot exam:  
 
Left:  
 Filament test normal sensation with micro filament Pulse DP: 2+ (normal) Pulse PT: 2+ (normal) Deformities: None Right:  
 Filament test normal sensation with micro filament Pulse DP: 2+ (normal) Pulse PT: 2+ (normal) Deformities: None Lab Results Component Value Date/Time WBC 7.5 04/19/2018 11:28 AM  
 HGB 14.4 04/19/2018 11:28 AM  
 HCT 44.3 04/19/2018 11:28 AM  
 PLATELET 271 (L) 43/59/6773 11:28 AM  
 MCV 94.9 04/19/2018 11:28 AM  
 
 
 
Lab Results Component Value Date/Time Sodium 145 01/16/2019 04:47 PM  
 Potassium 4.3 01/16/2019 04:47 PM  
 Chloride 106 01/16/2019 04:47 PM  
 CO2 26 01/16/2019 04:47 PM  
 Anion gap 13.0 01/16/2019 04:47 PM  
 Glucose 116 (H) 01/16/2019 04:47 PM  
 BUN 17 01/16/2019 04:47 PM  
 Creatinine 1.1 01/16/2019 04:47 PM  
 BUN/Creatinine ratio 13 04/19/2018 11:28 AM  
 GFR est AA >60 04/19/2018 11:28 AM  
 GFR est non-AA >60 04/19/2018 11:28 AM  
 Calcium 9.2 01/16/2019 04:47 PM  
 
 
 
Lab Results Component Value Date/Time ALT (SGPT) 12 01/16/2019 04:47 PM  
 AST (SGOT) 10 01/16/2019 04:47 PM  
 Alk. phosphatase 78 01/16/2019 04:47 PM  
 Bilirubin, direct <0.2 01/16/2019 04:47 PM  
 Bilirubin, total 0.3 01/16/2019 04:47 PM  
 
 
 
Lab Results Component Value Date/Time Cholesterol, total 151 01/16/2019 04:47 PM  
 HDL Cholesterol 40 01/16/2019 04:47 PM  
 LDL, calculated 85 01/16/2019 04:47 PM  
 VLDL, calculated 26 01/16/2019 04:47 PM  
 Triglyceride 128 01/16/2019 04:47 PM  
 CHOL/HDL Ratio 3.9 04/19/2018 11:28 AM  
 
 
 
Lab Results Component Value Date/Time TSH 0.55 04/19/2018 11:28 AM  
 T4, Free 1.1 04/19/2018 11:28 AM  
 
 
 
Lab Results Component Value Date/Time Vitamin D 25-Hydroxy 21.7 (L) 04/19/2018 11:28 AM  
   
 
 
ASSESSMENT:  
healthy adult male Diagnoses and all orders for this visit: 
 
1. Annual physical exam 
-     CBC WITH AUTOMATED DIFF; Future 
-     HEPATIC FUNCTION PANEL; Future -     LIPID PANEL; Future -     METABOLIC PANEL, BASIC; Future 
-     TSH 3RD GENERATION; Future -     T4, FREE; Future -     URINALYSIS W/ RFLX MICROSCOPIC; Future -     VITAMIN D, 25 HYDROXY; Future 2. Chest discomfort -     AMB POC EKG ROUTINE W/ 12 LEADS, INTER & REP 3. Type 2 diabetes mellitus without complication, without long-term current use of insulin (HCC) 
-     HEMOGLOBIN A1C W/O EAG; Future -     MICROALBUMIN, UR, RAND W/ MICROALB/CREAT RATIO; Future 
-     HM DIABETES FOOT EXAM 
 
4. Pure hypercholesterolemia 5. Prostate cancer screening -     PSA, DIAGNOSTIC (PROSTATE SPECIFIC AG); Future PLAN:  
follow a low fat, low cholesterol diet, attempt to lose weight and continue current medications. Will go to [facility] on weekend to do labs. The plan was discussed with the patient. The patient verbalized understanding and is in agreement with the plan. All medication potential side effects were discussed with the patient.

## 2019-05-15 NOTE — PATIENT INSTRUCTIONS

## 2019-05-18 ENCOUNTER — HOSPITAL ENCOUNTER (OUTPATIENT)
Dept: LAB | Age: 69
Discharge: HOME | End: 2019-05-18
Payer: COMMERCIAL

## 2019-05-18 LAB
25(OH)D3 SERPL-MCNC: 29.3 NG/ML (ref 32–100)
A-G RATIO,AGRAT: 1.8 RATIO (ref 1.1–2.6)
ABSOLUTE LYMPHOCYTE COUNT, 10803: 2.8 K/UL (ref 1–4.8)
ALBUMIN SERPL-MCNC: 4.5 G/DL (ref 3.5–5)
ALP SERPL-CCNC: 73 U/L (ref 40–125)
ALT SERPL-CCNC: 16 U/L (ref 5–40)
ANION GAP SERPL CALC-SCNC: 15 MMOL/L
AST SERPL W P-5'-P-CCNC: 13 U/L (ref 10–37)
AVG GLU, 10930: 126 MG/DL (ref 91–123)
BASOPHILS # BLD: 0 K/UL (ref 0–0.2)
BASOPHILS NFR BLD: 0 % (ref 0–2)
BILIRUB SERPL-MCNC: 0.3 MG/DL (ref 0.2–1.2)
BILIRUB UR QL: NEGATIVE
BILIRUBIN, DIRECT,CBIL: <0.2 MG/DL (ref 0–0.3)
BUN SERPL-MCNC: 19 MG/DL (ref 6–22)
CALCIUM SERPL-MCNC: 9.4 MG/DL (ref 8.4–10.4)
CHLORIDE SERPL-SCNC: 104 MMOL/L (ref 98–110)
CHOLEST SERPL-MCNC: 142 MG/DL (ref 110–200)
CO2 SERPL-SCNC: 22 MMOL/L (ref 20–32)
CREAT SERPL-MCNC: 1 MG/DL (ref 0.8–1.6)
CREATININE, URINE: 237 MG/DL
EOSINOPHIL # BLD: 0.1 K/UL (ref 0–0.5)
EOSINOPHIL NFR BLD: 1 % (ref 0–6)
EPITHELIAL,EPSU: ABNORMAL /HPF (ref 0–2)
ERYTHROCYTE [DISTWIDTH] IN BLOOD BY AUTOMATED COUNT: 13.4 % (ref 10–15.5)
GFRAA, 66117: >60
GFRNA, 66118: >60
GLOBULIN,GLOB: 2.5 G/DL (ref 2–4)
GLUCOSE SERPL-MCNC: 95 MG/DL (ref 70–99)
GLUCOSE UR QL: NEGATIVE MG/DL
GRANULOCYTES,GRANS: 41 % (ref 40–75)
HBA1C MFR BLD HPLC: 6 % (ref 4.8–5.9)
HCT VFR BLD AUTO: 43.1 % (ref 37.8–52.2)
HDLC SERPL-MCNC: 31 MG/DL (ref 40–59)
HDLC SERPL-MCNC: 4.6 MG/DL (ref 0–5)
HGB BLD-MCNC: 13.7 G/DL (ref 12.6–17.1)
HGB UR QL STRIP: NEGATIVE
KETONES UR QL STRIP.AUTO: NEGATIVE MG/DL
LDLC SERPL CALC-MCNC: 65 MG/DL (ref 50–99)
LEUKOCYTE ESTERASE: NEGATIVE
LYMPHOCYTES, LYMLT: 50 % (ref 20–45)
MCH RBC QN AUTO: 30 PG (ref 26–34)
MCHC RBC AUTO-ENTMCNC: 32 G/DL (ref 31–36)
MCV RBC AUTO: 94 FL (ref 80–95)
MICROALB/CREAT RATIO, 140286: NORMAL MCG/MG OF CREATININE (ref 0–30)
MICROALBUMIN,URINE RANDOM 140054: <12 MCG/ML (ref 0.1–17)
MONOCYTES # BLD: 0.4 K/UL (ref 0.1–1)
MONOCYTES NFR BLD: 8 % (ref 3–12)
NEUTROPHILS # BLD AUTO: 2.3 K/UL (ref 1.8–7.7)
NITRITE UR QL STRIP.AUTO: NEGATIVE
PH UR STRIP: 5 PH (ref 5–8)
PLATELET # BLD AUTO: 131 K/UL (ref 140–440)
PMV BLD AUTO: 11.5 FL (ref 9–13)
POTASSIUM SERPL-SCNC: 5.9 MMOL/L (ref 3.5–5.5)
PROT SERPL-MCNC: 7 G/DL (ref 6.2–8.1)
PROT UR QL STRIP: NEGATIVE MG/DL
PSA SERPL-MCNC: 0.8 NG/ML
RBC # BLD AUTO: 4.58 M/UL (ref 3.8–5.8)
RBC #/AREA URNS HPF: ABNORMAL /HPF
SODIUM SERPL-SCNC: 141 MMOL/L (ref 133–145)
SP GR UR: 1.02 (ref 1–1.03)
T4 FREE SERPL-MCNC: 1.2 NG/DL (ref 0.9–1.8)
TRIGL SERPL-MCNC: 232 MG/DL (ref 40–149)
TSH SERPL DL<=0.005 MIU/L-ACNC: 1.17 MCU/ML (ref 0.27–4.2)
UROBILINOGEN UR STRIP-MCNC: 2 MG/DL
VLDLC SERPL CALC-MCNC: 46 MG/DL (ref 8–30)
WBC # BLD AUTO: 5.5 K/UL (ref 4–11)
WBC URNS QL MICRO: ABNORMAL /HPF (ref 0–2)

## 2019-05-18 PROCEDURE — 36415 COLL VENOUS BLD VENIPUNCTURE: CPT

## 2019-05-18 PROCEDURE — 99001 SPECIMEN HANDLING PT-LAB: CPT

## 2019-05-19 DIAGNOSIS — E87.5 HYPERKALEMIA: Primary | ICD-10-CM

## 2019-05-19 NOTE — PROGRESS NOTES
Triglycerides are elevated. Please work on diet, increase exercise. Potassium is a little elevated. It may be a lab error. Please have it repeated soon. Order is in chart. Vit D low. Increase dose. Sugars are controlled.

## 2019-05-21 LAB — SENTARA SPECIMEN COL,SENBCF: NORMAL

## 2019-06-07 ENCOUNTER — HOSPITAL ENCOUNTER (OUTPATIENT)
Dept: LAB | Age: 69
Discharge: HOME | End: 2019-06-07
Payer: COMMERCIAL

## 2019-06-07 DIAGNOSIS — E87.5 HYPERKALEMIA: ICD-10-CM

## 2019-06-07 LAB — POTASSIUM SERPL-SCNC: 3.5 MMOL/L (ref 3.5–5.5)

## 2019-06-07 PROCEDURE — 36415 COLL VENOUS BLD VENIPUNCTURE: CPT

## 2019-06-07 PROCEDURE — 84132 ASSAY OF SERUM POTASSIUM: CPT

## 2019-09-08 RX ORDER — METFORMIN HYDROCHLORIDE 500 MG/1
TABLET ORAL
Qty: 180 TAB | Refills: 1 | Status: SHIPPED | OUTPATIENT
Start: 2019-09-08 | End: 2020-02-04 | Stop reason: SDUPTHER

## 2019-09-11 ENCOUNTER — TELEPHONE (OUTPATIENT)
Dept: FAMILY MEDICINE CLINIC | Age: 69
End: 2019-09-11

## 2019-09-11 DIAGNOSIS — E11.9 TYPE 2 DIABETES MELLITUS WITHOUT COMPLICATION, WITHOUT LONG-TERM CURRENT USE OF INSULIN (HCC): Primary | ICD-10-CM

## 2019-09-11 DIAGNOSIS — E55.9 VITAMIN D DEFICIENCY: ICD-10-CM

## 2019-09-11 DIAGNOSIS — E78.00 PURE HYPERCHOLESTEROLEMIA: ICD-10-CM

## 2019-09-11 NOTE — TELEPHONE ENCOUNTER
Pt would like to know if he needs to come in before his next appt for bw. Please advise and order labs if appropriate.      Future Appointments   Date Time Provider Renuka Merlene   9/18/2019  3:00 PM Gage Ball MD [facility]

## 2019-09-14 ENCOUNTER — HOSPITAL ENCOUNTER (OUTPATIENT)
Dept: LAB | Age: 69
Discharge: HOME | End: 2019-09-14
Payer: COMMERCIAL

## 2019-09-14 LAB
25(OH)D3 SERPL-MCNC: 23.5 NG/ML (ref 30–100)
ANION GAP SERPL CALC-SCNC: 7 MMOL/L (ref 3–18)
BUN SERPL-MCNC: 13 MG/DL (ref 7–18)
BUN/CREAT SERPL: 12 (ref 12–20)
CALCIUM SERPL-MCNC: 9.3 MG/DL (ref 8.5–10.1)
CHLORIDE SERPL-SCNC: 108 MMOL/L (ref 100–111)
CHOLEST SERPL-MCNC: 142 MG/DL
CO2 SERPL-SCNC: 26 MMOL/L (ref 21–32)
CREAT SERPL-MCNC: 1.07 MG/DL (ref 0.6–1.3)
GLUCOSE SERPL-MCNC: 108 MG/DL (ref 74–99)
HBA1C MFR BLD: 6.2 % (ref 4.2–5.6)
HDLC SERPL-MCNC: 35 MG/DL (ref 40–60)
HDLC SERPL: 4.1 {RATIO} (ref 0–5)
LDLC SERPL CALC-MCNC: 64.6 MG/DL (ref 0–100)
LIPID PROFILE,FLP: ABNORMAL
POTASSIUM SERPL-SCNC: 3.8 MMOL/L (ref 3.5–5.5)
SODIUM SERPL-SCNC: 141 MMOL/L (ref 136–145)
TRIGL SERPL-MCNC: 212 MG/DL (ref ?–150)
VLDLC SERPL CALC-MCNC: 42.4 MG/DL

## 2019-09-14 PROCEDURE — 80061 LIPID PANEL: CPT

## 2019-09-14 PROCEDURE — 83036 HEMOGLOBIN GLYCOSYLATED A1C: CPT

## 2019-09-14 PROCEDURE — 36415 COLL VENOUS BLD VENIPUNCTURE: CPT

## 2019-09-14 PROCEDURE — 82306 VITAMIN D 25 HYDROXY: CPT

## 2019-09-14 PROCEDURE — 80048 BASIC METABOLIC PNL TOTAL CA: CPT

## 2019-09-18 ENCOUNTER — OFFICE VISIT (OUTPATIENT)
Dept: FAMILY MEDICINE CLINIC | Age: 69
End: 2019-09-18

## 2019-09-18 VITALS
WEIGHT: 178 LBS | BODY MASS INDEX: 28.61 KG/M2 | SYSTOLIC BLOOD PRESSURE: 107 MMHG | OXYGEN SATURATION: 97 % | RESPIRATION RATE: 16 BRPM | HEIGHT: 66 IN | DIASTOLIC BLOOD PRESSURE: 80 MMHG | HEART RATE: 72 BPM | TEMPERATURE: 96.4 F

## 2019-09-18 DIAGNOSIS — E78.00 PURE HYPERCHOLESTEROLEMIA: ICD-10-CM

## 2019-09-18 DIAGNOSIS — E55.9 VITAMIN D DEFICIENCY: ICD-10-CM

## 2019-09-18 DIAGNOSIS — E11.9 TYPE 2 DIABETES MELLITUS WITHOUT COMPLICATION, WITHOUT LONG-TERM CURRENT USE OF INSULIN (HCC): Primary | ICD-10-CM

## 2019-09-18 RX ORDER — METFORMIN HYDROCHLORIDE 500 MG/1
TABLET ORAL
Qty: 180 TAB | Refills: 1 | Status: SHIPPED | OUTPATIENT
Start: 2019-09-18 | End: 2020-02-04 | Stop reason: SDUPTHER

## 2019-09-18 RX ORDER — SIMVASTATIN 10 MG/1
10 TABLET, FILM COATED ORAL
Qty: 90 TAB | Refills: 1 | Status: SHIPPED | OUTPATIENT
Start: 2019-09-18 | End: 2020-02-04 | Stop reason: SDUPTHER

## 2019-09-18 NOTE — PROGRESS NOTES
Rosendo Alonso is a 81 y.o. male (: 1950) presenting to address:    Chief Complaint   Patient presents with    Diabetes    Vitamin D Deficiency       Vitals:    19 1501   BP: 107/80   Pulse: 72   Resp: 16   Temp: 96.4 °F (35.8 °C)   TempSrc: Oral   SpO2: 97%   Weight: 178 lb (80.7 kg)   Height: 5' 5.67\" (1.668 m)   PainSc:   0 - No pain       Hearing/Vision:   No exam data present    Learning Assessment:     Learning Assessment 2014   PRIMARY LEARNER Patient   HIGHEST LEVEL OF EDUCATION - PRIMARY LEARNER  GRADUATED HIGH SCHOOL OR GED   BARRIERS PRIMARY LEARNER NONE   PRIMARY LANGUAGE ENGLISH   LEARNER PREFERENCE PRIMARY VIDEOS   ANSWERED BY PATIENT   RELATIONSHIP SELF     Depression Screening:     3 most recent PHQ Screens 1/15/2019   Little interest or pleasure in doing things Not at all   Feeling down, depressed, irritable, or hopeless Not at all   Total Score PHQ 2 0     Fall Risk Assessment:     Fall Risk Assessment, last 12 mths 5/15/2019   Able to walk? Yes   Fall in past 12 months? No     Abuse Screening:     Abuse Screening Questionnaire 2018   Do you ever feel afraid of your partner? N   Are you in a relationship with someone who physically or mentally threatens you? N   Is it safe for you to go home? Y     Coordination of Care Questionaire:   1. Have you been to the ER, urgent care clinic since your last visit? Hospitalized since your last visit? NO    2. Have you seen or consulted any other health care providers outside of the [address] [place] [address] since your last visit? Include any pap smears or colon screening. NO    Advanced Directive:   1. Do you have an Advanced Directive? NO    2. Would you like information on Advanced Directives?  NO

## 2019-09-18 NOTE — PROGRESS NOTES
Assessment/Plan:    *Diagnoses and all orders for this visit:    1. Type 2 diabetes mellitus without complication, without long-term current use of insulin ([address]    2. Pure hypercholesterolemia    3. Vitamin D deficiency    Other orders  -     metFORMIN (GLUCOPHAGE) 500 mg tablet; take 1 tablet by mouth twice a day with food  -     simvastatin (ZOCOR) 10 mg tablet; Take 1 Tab by mouth nightly. F/u 4 months. Just A1c. The plan was discussed with the patient. The patient verbalized understanding and is in agreement with the plan. All medication potential side effects were discussed with the patient.    -------------------------------------------------------------------------------------------------------------------        Sumaya Kang is a 81 y.o. male and presents with Diabetes and Vitamin D Deficiency         Subjective:  Pt here for f/u. DM: doing well, controlled. HLD:  Tch and LDL stable. TGs improving, mildly elevated. Vit D still low. He has not been regular with taking this. ROS:  Review of Systems - Negative         The problem list was updated as a part of today's visit. Patient Active Problem List   Diagnosis Code    Hyperlipidemia E78.5    Lactose intolerance E73.9    Sinus congestion R09.81    CAD (coronary artery disease) I25.10    Vitamin D deficiency E55.9    Low serum testosterone level R79.89    Hiatal hernia K44.9    History of Helicobacter pylori infection Z86.19    Gastric ulcer K25.9    Impotence of organic origin N52.9    Diabetes mellitus ([address] E11.9    Scrotal abscess G74.5    Umbilical hernia without obstruction and without gangrene K42.9       The PSH, FH were reviewed.         SH:  Social History     Tobacco Use    Smoking status: Former Smoker     Packs/day: 1.00    Smokeless tobacco: Current User   Substance Use Topics    Alcohol use: Yes     Comment: occasionally    Drug use: No       Medications/Allergies:  Current Outpatient Medications on File Prior to Visit   Medication Sig Dispense Refill    glucose blood VI test strips (ONETOUCH ULTRA BLUE TEST STRIP) strip Once daily testing. 100 Strip 2    simvastatin (ZOCOR) 10 mg tablet take 1 tablet by mouth NIGHTLY 90 Tab 2    omeprazole (PRILOSEC) 20 mg capsule Take 1 Cap by mouth daily. 0    montelukast (SINGULAIR) 10 mg tablet Take 1 Tab by mouth daily. 0    FIBER THERAPY LAXATIVE, HUSK, 0.52 gram capsule Take 1 Cap by mouth daily. 0    sildenafil citrate (VIAGRA) 100 mg tablet Take 1 Tab by mouth daily as needed for up to 4 doses. 6 Tab 12    Lancets (ONETOUCH ULTRASOFT LANCETS) misc For once daily testing 1 Each 5    nitroglycerin (NITROSTAT) 0.4 mg SL tablet by SubLINGual route every five (5) minutes as needed for Chest Pain.  metFORMIN (GLUCOPHAGE) 500 mg tablet take 1 tablet by mouth twice a day with food 180 Tab 1     No current facility-administered medications on file prior to visit. Allergies   Allergen Reactions    Amaryl [Glimepiride] Vertigo     Patient reported that this medications made him very dizzy and light headed. It was discontinued by Dr Isabel Pederson Maintenance:   Health Maintenance   Topic Date Due    Shingrix Vaccine Age 50> (1 of 2) 08/29/2002    Pneumococcal 65+ years (1 of 2 - PCV13) 08/29/2017    Influenza Age 5 to Adult  09/15/2027 (Originally 8/1/2019)    HEMOGLOBIN A1C Q6M  03/14/2020    FOOT EXAM Q1  05/15/2020    MICROALBUMIN Q1  05/18/2020    LIPID PANEL Q1  09/14/2020    EYE EXAM RETINAL OR DILATED  01/11/2021    GLAUCOMA SCREENING Q2Y  05/21/2021    COLONOSCOPY  03/22/2022    DTaP/Tdap/Td series (2 - Td) 12/16/2024    Hepatitis C Screening  Completed       Objective:  Visit Vitals  /80   Pulse 72   Temp 96.4 °F (35.8 °C) (Oral)   Resp 16   Ht 5' 5.67\" (1.668 m)   Wt 178 lb (80.7 kg)   SpO2 97%   BMI 29.02 kg/m²          Nurses notes and VS reviewed.       Physical Examination: General appearance - alert, well appearing, and in no distress  Chest - clear to auscultation, no wheezes, rales or rhonchi, symmetric air entry  Heart - normal rate, regular rhythm, normal S1, S2, no murmurs, rubs, clicks or gallops  Abdomen - soft, nontender, nondistended, no masses or organomegaly          Labwork and Ancillary Studies:    CBC w/Diff  Lab Results   Component Value Date/Time    WBC 5.5 05/18/2019 01:00 PM    HGB 13.7 05/18/2019 01:00 PM    PLATELET 445 (L) 50/09/1731 01:00 PM         Basic Metabolic Profile  Lab Results   Component Value Date/Time    Sodium 141 09/14/2019 08:18 AM    Potassium 3.8 09/14/2019 08:18 AM    Chloride 108 09/14/2019 08:18 AM    CO2 26 09/14/2019 08:18 AM    Anion gap 7 09/14/2019 08:18 AM    Glucose 108 (H) 09/14/2019 08:18 AM    BUN 13 09/14/2019 08:18 AM    Creatinine 1.07 09/14/2019 08:18 AM    BUN/Creatinine ratio 12 09/14/2019 08:18 AM    GFR est AA >60 09/14/2019 08:18 AM    GFR est non-AA >60 09/14/2019 08:18 AM    Calcium 9.3 09/14/2019 08:18 AM         LFT  Lab Results   Component Value Date/Time    ALT (SGPT) 16 05/18/2019 01:00 PM    AST (SGOT) 13 05/18/2019 01:00 PM    Alk.  phosphatase 73 05/18/2019 01:00 PM    Bilirubin, direct <0.2 05/18/2019 01:00 PM    Bilirubin, total 0.3 05/18/2019 01:00 PM         Cholesterol  Lab Results   Component Value Date/Time    Cholesterol, total 142 09/14/2019 08:18 AM    HDL Cholesterol 35 (L) 09/14/2019 08:18 AM    LDL, calculated 64.6 09/14/2019 08:18 AM    Triglyceride 212 (H) 09/14/2019 08:18 AM    CHOL/HDL Ratio 4.1 09/14/2019 08:18 AM

## 2019-09-18 NOTE — PATIENT INSTRUCTIONS

## 2020-01-20 ENCOUNTER — HOSPITAL ENCOUNTER (OUTPATIENT)
Dept: LAB | Age: 70
Discharge: HOME | End: 2020-01-20
Payer: COMMERCIAL

## 2020-01-20 DIAGNOSIS — E11.9 TYPE 2 DIABETES MELLITUS WITHOUT COMPLICATION, WITHOUT LONG-TERM CURRENT USE OF INSULIN (HCC): ICD-10-CM

## 2020-01-20 LAB — HBA1C MFR BLD: 6.8 % (ref 4.2–5.6)

## 2020-01-20 PROCEDURE — 36415 COLL VENOUS BLD VENIPUNCTURE: CPT

## 2020-01-20 PROCEDURE — 83036 HEMOGLOBIN GLYCOSYLATED A1C: CPT

## 2020-02-04 ENCOUNTER — OFFICE VISIT (OUTPATIENT)
Dept: FAMILY MEDICINE CLINIC | Age: 70
End: 2020-02-04

## 2020-02-04 VITALS
HEART RATE: 60 BPM | SYSTOLIC BLOOD PRESSURE: 130 MMHG | RESPIRATION RATE: 16 BRPM | WEIGHT: 180 LBS | TEMPERATURE: 97.8 F | DIASTOLIC BLOOD PRESSURE: 77 MMHG | OXYGEN SATURATION: 100 % | HEIGHT: 66 IN | BODY MASS INDEX: 28.93 KG/M2

## 2020-02-04 DIAGNOSIS — E11.9 TYPE 2 DIABETES MELLITUS WITHOUT COMPLICATION (HCC): Primary | ICD-10-CM

## 2020-02-04 DIAGNOSIS — R61 EXCESSIVE SWEATING: ICD-10-CM

## 2020-02-04 DIAGNOSIS — E78.00 PURE HYPERCHOLESTEROLEMIA: ICD-10-CM

## 2020-02-04 RX ORDER — METFORMIN HYDROCHLORIDE 500 MG/1
TABLET ORAL
Qty: 180 TAB | Refills: 1 | Status: SHIPPED | OUTPATIENT
Start: 2020-02-04 | End: 2020-09-25

## 2020-02-04 RX ORDER — SIMVASTATIN 10 MG/1
10 TABLET, FILM COATED ORAL
Qty: 90 TAB | Refills: 1 | Status: SHIPPED | OUTPATIENT
Start: 2020-02-04 | End: 2020-07-13 | Stop reason: SDUPTHER

## 2020-02-04 RX ORDER — ASPIRIN 81 MG/1
TABLET ORAL DAILY
COMMUNITY

## 2020-02-04 NOTE — PROGRESS NOTES
Assessment/Plan:    *Diagnoses and all orders for this visit:    1. Type 2 diabetes mellitus without complication (HCC)  -     glucose blood VI test strips (ONETOUCH ULTRA BLUE TEST STRIP) strip; Once daily testing. 2. Pure hypercholesterolemia    3. Excessive sweating  -     TSH 3RD GENERATION; Future  -     T4, FREE; Future  -     METABOLIC PANEL, BASIC; Future  -     CBC WITH AUTOMATED DIFF; Future    Other orders  -     metFORMIN (GLUCOPHAGE) 500 mg tablet; take 1 tablet by mouth twice a day with food  -     simvastatin (ZOCOR) 10 mg tablet; Take 1 Tab by mouth nightly. Physical in May. BW prior. Will return for above labs this week. He will monitor his sleep for now. Did not want any referral to a sleep specialist yet. The plan was discussed with the patient. The patient verbalized understanding and is in agreement with the plan. All medication potential side effects were discussed with the patient.    -------------------------------------------------------------------------------------------------------------------        Real Logan is a 81 y.o. male and presents with Diabetes and Visual Problems (started a year ago getting worse . )         Subjective:  Pt here for f/u. DM:  Well controlled, A1c 6.8%. HLD:  Has been stable. Pt has been having unusual sweating when he first wakes up in the AM a few days a week. He typically feels fine, he has checked his sugars and they are never < 100 mg/dL. He feels fine the rest of the day. He also says that he never really gets a full night's sleep. Wakes up several times, says he has a lot on is mind. Denies any gasping in sleep, wife has never noticed this.       Review of Systems - ENT ROS: negative  Respiratory ROS: no cough, shortness of breath, or wheezing  Cardiovascular ROS: no chest pain or dyspnea on exertion  Gastrointestinal ROS: no abdominal pain, change in bowel habits, or black or bloody stools  Musculoskeletal ROS: negative         The problem list was updated as a part of today's visit. Patient Active Problem List   Diagnosis Code    Hyperlipidemia E78.5    Lactose intolerance E73.9    Sinus congestion R09.81    CAD (coronary artery disease) I25.10    Vitamin D deficiency E55.9    Low serum testosterone level R79.89    Hiatal hernia K44.9    History of Helicobacter pylori infection Z86.19    Gastric ulcer K25.9    Impotence of organic origin N52.9    Diabetes mellitus [address] E11.9    Scrotal abscess T87.8    Umbilical hernia without obstruction and without gangrene K42.9       The PSH, FH were reviewed. SH:  Social History     Tobacco Use    Smoking status: Former Smoker     Packs/day: 1.00    Smokeless tobacco: Current User   Substance Use Topics    Alcohol use: Yes     Comment: occasionally    Drug use: No       Medications/Allergies:  Current Outpatient Medications on File Prior to Visit   Medication Sig Dispense Refill    aspirin delayed-release 81 mg tablet Take  by mouth daily.  simvastatin (ZOCOR) 10 mg tablet take 1 tablet by mouth NIGHTLY 90 Tab 2    omeprazole (PRILOSEC) 20 mg capsule Take 1 Cap by mouth daily. 0    montelukast (SINGULAIR) 10 mg tablet Take 1 Tab by mouth daily. 0    FIBER THERAPY LAXATIVE, HUSK, 0.52 gram capsule Take 1 Cap by mouth daily. 0    sildenafil citrate (VIAGRA) 100 mg tablet Take 1 Tab by mouth daily as needed for up to 4 doses. 6 Tab 12    Lancets (ONETOUCH ULTRASOFT LANCETS) misc For once daily testing 1 Each 5    nitroglycerin (NITROSTAT) 0.4 mg SL tablet by SubLINGual route every five (5) minutes as needed for Chest Pain.  [DISCONTINUED] metFORMIN (GLUCOPHAGE) 500 mg tablet take 1 tablet by mouth twice a day with food 180 Tab 1    [DISCONTINUED] simvastatin (ZOCOR) 10 mg tablet Take 1 Tab by mouth nightly. 90 Tab 1    [DISCONTINUED] glucose blood VI test strips (ONETOUCH ULTRA BLUE TEST STRIP) strip Once daily testing.  100 Strip 2    [DISCONTINUED] metFORMIN (GLUCOPHAGE) 500 mg tablet take 1 tablet by mouth twice a day with food 180 Tab 1     No current facility-administered medications on file prior to visit. Allergies   Allergen Reactions    Amaryl [Glimepiride] Vertigo     Patient reported that this medications made him very dizzy and light headed. It was discontinued by Dr Zaynab Muniz Maintenance:   Health Maintenance   Topic Date Due    Shingrix Vaccine Age 50> (1 of 2) 08/29/2002    Pneumococcal 65+ years (1 of 1 - PPSV23) 08/29/2017    Influenza Age 5 to Adult  09/15/2027 (Originally 8/1/2019)    FOOT EXAM Q1  05/15/2020    MICROALBUMIN Q1  05/18/2020    HEMOGLOBIN A1C Q6M  07/20/2020    LIPID PANEL Q1  09/14/2020    FOBT Q 1 YEAR, 18+  10/21/2020    EYE EXAM RETINAL OR DILATED  01/11/2021    GLAUCOMA SCREENING Q2Y  05/21/2021    COLONOSCOPY  03/22/2022    DTaP/Tdap/Td series (2 - Td) 12/16/2024    Hepatitis C Screening  Completed       Objective:  Visit Vitals  /77   Pulse 60   Temp 97.8 °F (36.6 °C) (Oral)   Resp 16   Ht 5' 5.67\" (1.668 m)   Wt 180 lb (81.6 kg)   SpO2 100%   BMI 29.35 kg/m²          Nurses notes and VS reviewed.       Physical Examination: General appearance - alert, well appearing, and in no distress  Chest - clear to auscultation, no wheezes, rales or rhonchi, symmetric air entry  Heart - normal rate, regular rhythm, normal S1, S2, no murmurs, rubs, clicks or gallops  Abdomen - soft, nontender, nondistended, no masses or organomegaly  Musculoskeletal - no joint tenderness, deformity or swelling  Extremities - peripheral pulses normal, no pedal edema, no clubbing or cyanosis          Labwork and Ancillary Studies:    CBC w/Diff  Lab Results   Component Value Date/Time    WBC 5.5 05/18/2019 01:00 PM    HGB 13.7 05/18/2019 01:00 PM    PLATELET 675 (L) 13/50/9064 01:00 PM         Basic Metabolic Profile  Lab Results   Component Value Date/Time    Sodium 141 09/14/2019 08:18 AM Potassium 3.8 09/14/2019 08:18 AM    Chloride 108 09/14/2019 08:18 AM    CO2 26 09/14/2019 08:18 AM    Anion gap 7 09/14/2019 08:18 AM    Glucose 108 (H) 09/14/2019 08:18 AM    BUN 13 09/14/2019 08:18 AM    Creatinine 1.07 09/14/2019 08:18 AM    BUN/Creatinine ratio 12 09/14/2019 08:18 AM    GFR est AA >60 09/14/2019 08:18 AM    GFR est non-AA >60 09/14/2019 08:18 AM    Calcium 9.3 09/14/2019 08:18 AM         LFT  Lab Results   Component Value Date/Time    ALT (SGPT) 16 05/18/2019 01:00 PM    AST (SGOT) 13 05/18/2019 01:00 PM    Alk.  phosphatase 73 05/18/2019 01:00 PM    Bilirubin, direct <0.2 05/18/2019 01:00 PM    Bilirubin, total 0.3 05/18/2019 01:00 PM         Cholesterol  Lab Results   Component Value Date/Time    Cholesterol, total 142 09/14/2019 08:18 AM    HDL Cholesterol 35 (L) 09/14/2019 08:18 AM    LDL, calculated 64.6 09/14/2019 08:18 AM    Triglyceride 212 (H) 09/14/2019 08:18 AM    CHOL/HDL Ratio 4.1 09/14/2019 08:18 AM

## 2020-02-04 NOTE — PROGRESS NOTES
Angelita Grove is a 81 y.o. male (: 1950) presenting to address:    Chief Complaint   Patient presents with    Diabetes    Visual Problems     started a year ago getting worse . Vitals:    20 1519   BP: 130/77   Pulse: 60   Resp: 16   Temp: 97.8 °F (36.6 °C)   TempSrc: Oral   SpO2: 100%   Weight: 180 lb (81.6 kg)   Height: 5' 5.67\" (1.668 m)   PainSc:   0 - No pain       Hearing/Vision:   No exam data present    Learning Assessment:     Learning Assessment 2014   PRIMARY LEARNER Patient   HIGHEST LEVEL OF EDUCATION - PRIMARY LEARNER  GRADUATED HIGH SCHOOL OR GED   BARRIERS PRIMARY LEARNER NONE   PRIMARY LANGUAGE ENGLISH   LEARNER PREFERENCE PRIMARY VIDEOS   ANSWERED BY PATIENT   RELATIONSHIP SELF     Depression Screening:     3 most recent PHQ Screens 2020   Little interest or pleasure in doing things Not at all   Feeling down, depressed, irritable, or hopeless Not at all   Total Score PHQ 2 0     Fall Risk Assessment:     Fall Risk Assessment, last 12 mths 5/15/2019   Able to walk? Yes   Fall in past 12 months? No     Abuse Screening:     Abuse Screening Questionnaire 2018   Do you ever feel afraid of your partner? N   Are you in a relationship with someone who physically or mentally threatens you? N   Is it safe for you to go home? Y     Coordination of Care Questionaire:   1. Have you been to the ER, urgent care clinic since your last visit? Hospitalized since your last visit? NO    2. Have you seen or consulted any other health care providers outside of the [address] [place] [address] since your last visit? Include any pap smears or colon screening. NO    Advanced Directive:   1. Do you have an Advanced Directive? NO    2. Would you like information on Advanced Directives?  NO

## 2020-02-04 NOTE — PATIENT INSTRUCTIONS

## 2020-05-20 ENCOUNTER — TELEPHONE (OUTPATIENT)
Dept: FAMILY MEDICINE CLINIC | Age: 70
End: 2020-05-20

## 2020-05-20 NOTE — TELEPHONE ENCOUNTER
Pt is requesting a call back from the nurse he is wanting to talk about some symptoms he is experiencing that he wasn't sure if it was apart of his acid reflux or if it was for something else. Please return his call at the earliest convenience.

## 2020-05-22 NOTE — TELEPHONE ENCOUNTER
Returned pt call. Pt awoke 05/17/20 with a bad cough and spitting episode. He was wondering if this was reflux or something else. When he didn't get an immediate call back he went to [facility] by his house. He had some PND so they gave him doxycycline to cover for possible sinus infection. Pt doesn't really feel any better and he is sneezing. Pt said he takes [employer] brand Claritin. I asked him to switch to a different otc antihistamine to see if that would help with his PND and to finish the doxy [facility] gave him. Pt will do this and call next week for an appt.

## 2020-06-29 ENCOUNTER — TELEPHONE (OUTPATIENT)
Dept: FAMILY MEDICINE CLINIC | Age: 70
End: 2020-06-29

## 2020-06-29 NOTE — TELEPHONE ENCOUNTER
Pt called requesting a call back at the earliest convenience because he is seeking for medical advice. He did not want to specify what exactly it was about however he did state that it is not an emergency so whenever we can get back to him will be fine.

## 2020-07-01 NOTE — TELEPHONE ENCOUNTER
Patient called about concerns . His son in law was in contact with someone who has covid and there daughter comes over often . They just found out on Saturday and his wife and himself and have taking daily temps and have no symptoms . Son in law just went for testing yesterday and waiting on result. I spoke with Dr Lisa Drew and no need to be tested since he doesn't have symptoms . He voiced understanding .

## 2020-07-11 LAB
BASOPHILS # BLD AUTO: 0 X10E3/UL (ref 0–0.2)
BASOPHILS NFR BLD AUTO: 1 %
BUN SERPL-MCNC: 19 MG/DL (ref 8–27)
BUN/CREAT SERPL: 15 (ref 10–24)
CALCIUM SERPL-MCNC: 9.4 MG/DL (ref 8.6–10.2)
CHLORIDE SERPL-SCNC: 103 MMOL/L (ref 96–106)
CO2 SERPL-SCNC: 23 MMOL/L (ref 20–29)
CREAT SERPL-MCNC: 1.24 MG/DL (ref 0.76–1.27)
EOSINOPHIL # BLD AUTO: 0.1 X10E3/UL (ref 0–0.4)
EOSINOPHIL NFR BLD AUTO: 1 %
ERYTHROCYTE [DISTWIDTH] IN BLOOD BY AUTOMATED COUNT: 13.4 % (ref 11.6–15.4)
GLUCOSE SERPL-MCNC: 127 MG/DL (ref 65–99)
HCT VFR BLD AUTO: 40.2 % (ref 37.5–51)
HGB BLD-MCNC: 13.8 G/DL (ref 13–17.7)
IMM GRANULOCYTES # BLD AUTO: 0 X10E3/UL (ref 0–0.1)
IMM GRANULOCYTES NFR BLD AUTO: 0 %
LYMPHOCYTES # BLD AUTO: 2.8 X10E3/UL (ref 0.7–3.1)
LYMPHOCYTES NFR BLD AUTO: 50 %
MCH RBC QN AUTO: 31 PG (ref 26.6–33)
MCHC RBC AUTO-ENTMCNC: 34.3 G/DL (ref 31.5–35.7)
MCV RBC AUTO: 90 FL (ref 79–97)
MONOCYTES # BLD AUTO: 0.5 X10E3/UL (ref 0.1–0.9)
MONOCYTES NFR BLD AUTO: 10 %
NEUTROPHILS # BLD AUTO: 2.1 X10E3/UL (ref 1.4–7)
NEUTROPHILS NFR BLD AUTO: 38 %
PLATELET # BLD AUTO: 131 X10E3/UL (ref 150–450)
POTASSIUM SERPL-SCNC: 4.2 MMOL/L (ref 3.5–5.2)
RBC # BLD AUTO: 4.45 X10E6/UL (ref 4.14–5.8)
SODIUM SERPL-SCNC: 140 MMOL/L (ref 134–144)
T4 FREE SERPL-MCNC: 1.18 NG/DL (ref 0.82–1.77)
TSH SERPL DL<=0.005 MIU/L-ACNC: 1.31 UIU/ML (ref 0.45–4.5)
WBC # BLD AUTO: 5.6 X10E3/UL (ref 3.4–10.8)

## 2020-07-13 ENCOUNTER — OFFICE VISIT (OUTPATIENT)
Dept: FAMILY MEDICINE CLINIC | Age: 70
End: 2020-07-13

## 2020-07-13 VITALS
DIASTOLIC BLOOD PRESSURE: 80 MMHG | BODY MASS INDEX: 29.41 KG/M2 | SYSTOLIC BLOOD PRESSURE: 142 MMHG | OXYGEN SATURATION: 97 % | HEART RATE: 63 BPM | RESPIRATION RATE: 16 BRPM | WEIGHT: 183 LBS | TEMPERATURE: 97.7 F | HEIGHT: 66 IN

## 2020-07-13 DIAGNOSIS — E11.9 TYPE 2 DIABETES MELLITUS WITHOUT COMPLICATION, WITHOUT LONG-TERM CURRENT USE OF INSULIN (HCC): ICD-10-CM

## 2020-07-13 DIAGNOSIS — E55.9 VITAMIN D DEFICIENCY: ICD-10-CM

## 2020-07-13 DIAGNOSIS — Z12.5 PROSTATE CANCER SCREENING: ICD-10-CM

## 2020-07-13 DIAGNOSIS — Z00.00 ANNUAL PHYSICAL EXAM: Primary | ICD-10-CM

## 2020-07-13 DIAGNOSIS — E78.00 PURE HYPERCHOLESTEROLEMIA: ICD-10-CM

## 2020-07-13 NOTE — PROGRESS NOTES
SUBJECTIVE:   Tapan Acosta is a 81 y.o. male presenting for his annual checkup. Past Medical History:   Diagnosis Date    CAD (coronary artery disease)     Constipation     Diabetes mellitus ([address] 8/7/2015    Diarrhea     Gastric ulcer 12/16/2014    On EGD in 2014.  Hiatal hernia 12/16/2014    On EGD, 2014. With Dr. Kim Harvey.  History of Helicobacter pylori infection 12/16/2014    On EGD in 2014, Dr. Kim Harvey. S/p Tx.  Hyperlipidemia     Impotence of organic origin     Lactose intolerance     Low serum testosterone level     Nocturia     Other testicular hypofunction     Scrotal abscess 4/27/2018    Sinus congestion     Stented coronary artery 09/2013.  Umbilical hernia without obstruction and without gangrene 4/27/2018    Vitamin D deficiency        Allergies: Amaryl [glimepiride]     Current Outpatient Medications   Medication Sig    aspirin delayed-release 81 mg tablet Take  by mouth daily.  metFORMIN (GLUCOPHAGE) 500 mg tablet take 1 tablet by mouth twice a day with food    glucose blood VI test strips (ONETOUCH ULTRA BLUE TEST STRIP) strip Once daily testing.  simvastatin (ZOCOR) 10 mg tablet take 1 tablet by mouth NIGHTLY    omeprazole (PRILOSEC) 20 mg capsule Take 1 Cap by mouth daily.  montelukast (SINGULAIR) 10 mg tablet Take 1 Tab by mouth daily.  FIBER THERAPY LAXATIVE, HUSK, 0.52 gram capsule Take 1 Cap by mouth daily.  sildenafil citrate (VIAGRA) 100 mg tablet Take 1 Tab by mouth daily as needed for up to 4 doses.  Lancets (ONETOUCH ULTRASOFT LANCETS) misc For once daily testing    nitroglycerin (NITROSTAT) 0.4 mg SL tablet by SubLINGual route every five (5) minutes as needed for Chest Pain. No current facility-administered medications for this visit. ROS:  Feeling well. No dyspnea or chest pain on exertion. No abdominal pain, change in bowel habits, black or bloody stools. No urinary tract or prostatic symptoms.  No neurological complaints. OBJECTIVE:   The patient appears well, alert, oriented x 3, in no distress. Visit Vitals  /80 (BP 1 Location: Left arm)   Pulse 63   Temp 97.7 °F (36.5 °C) (Oral)   Resp 16   Ht 5' 5.67\" (1.668 m)   Wt 183 lb (83 kg)   SpO2 97%   BMI 29.83 kg/m²       General appearance: alert, cooperative, no distress, appears stated age  Head: Normocephalic, without obvious abnormality, atraumatic  Ears: normal TM's and external ear canals AU  Throat: Lips, mucosa, and tongue normal. Teeth and gums normal  Neck: supple, symmetrical, trachea midline, no adenopathy, thyroid: not enlarged, symmetric, no tenderness/mass/nodules, no carotid bruit and no JVD  Back: symmetric, no curvature. ROM normal. No CVA tenderness. Lungs: clear to auscultation bilaterally  Heart: regular rate and rhythm, S1, S2 normal, no murmur, click, rub or gallop  Abdomen: soft, non-tender. Bowel sounds normal. No masses,  no organomegaly  Extremities: extremities normal, atraumatic, no cyanosis or edema  Pulses: 2+ and symmetric  Skin: Skin color, texture, turgor normal. No rashes or lesions  Neurological is normal, no focal findings. Lab Results   Component Value Date/Time    WBC 5.6 07/10/2020 08:40 AM    HGB 13.8 07/10/2020 08:40 AM    HCT 40.2 07/10/2020 08:40 AM    PLATELET 937 (L) 40/03/3421 08:40 AM    MCV 90 07/10/2020 08:40 AM         Lab Results   Component Value Date/Time    Sodium 140 07/10/2020 08:40 AM    Potassium 4.2 07/10/2020 08:40 AM    Chloride 103 07/10/2020 08:40 AM    CO2 23 07/10/2020 08:40 AM    Anion gap 7 09/14/2019 08:18 AM    Glucose 127 (H) 07/10/2020 08:40 AM    BUN 19 07/10/2020 08:40 AM    Creatinine 1.24 07/10/2020 08:40 AM    BUN/Creatinine ratio 15 07/10/2020 08:40 AM    GFR est AA 69 07/10/2020 08:40 AM    GFR est non-AA 60 07/10/2020 08:40 AM    Calcium 9.4 07/10/2020 08:40 AM         Lab Results   Component Value Date/Time    ALT (SGPT) 16 05/18/2019 01:00 PM    Alk.  phosphatase 73 05/18/2019 01:00 PM    Bilirubin, direct <0.2 05/18/2019 01:00 PM    Bilirubin, total 0.3 05/18/2019 01:00 PM         Lab Results   Component Value Date/Time    Cholesterol, total 142 09/14/2019 08:18 AM    HDL Cholesterol 35 (L) 09/14/2019 08:18 AM    LDL, calculated 64.6 09/14/2019 08:18 AM    VLDL, calculated 42.4 09/14/2019 08:18 AM    Triglyceride 212 (H) 09/14/2019 08:18 AM    CHOL/HDL Ratio 4.1 09/14/2019 08:18 AM         Lab Results   Component Value Date/Time    TSH 1.310 07/10/2020 08:40 AM    T4, Free 1.18 07/10/2020 08:40 AM         Lab Results   Component Value Date/Time    Vitamin D 25-Hydroxy 23.5 (L) 09/14/2019 08:27 AM           ASSESSMENT:   healthy adult male    Diagnoses and all orders for this visit:    1. Annual physical exam    2. Prostate cancer screening  -     PSA, DIAGNOSTIC (PROSTATE SPECIFIC AG); Future    3. Type 2 diabetes mellitus without complication, without long-term current use of insulin (HCC)  -     HEPATIC FUNCTION PANEL; Future  -     LIPID PANEL; Future  -     HEMOGLOBIN A1C W/O EAG; Future  -     MICROALBUMIN, UR, RAND W/ MICROALB/CREAT RATIO; Future    4. Vitamin D deficiency  -     VITAMIN D, 25 HYDROXY; Future    5. Pure hypercholesterolemia          PLAN:   follow a low fat, low cholesterol diet. Needs to get some additional labs that were not done with the original order. The plan was discussed with the patient. The patient verbalized understanding and is in agreement with the plan. All medication potential side effects were discussed with the patient.

## 2020-07-13 NOTE — PROGRESS NOTES
Philippe Barbosa is a 81 y.o. male (: 1950) presenting to address:    Chief Complaint   Patient presents with    Complete Physical       Vitals:    20 1121   BP: 148/84   Pulse: 63   Resp: 16   Temp: 97.7 °F (36.5 °C)   TempSrc: Oral   SpO2: 97%   Weight: 183 lb (83 kg)   Height: 5' 5.67\" (1.668 m)   PainSc:   0 - No pain       Hearing/Vision:      Hearing Screening    125Hz 250Hz 500Hz 1000Hz 2000Hz 3000Hz 4000Hz 6000Hz 8000Hz   Right ear:            Left ear:               Visual Acuity Screening    Right eye Left eye Both eyes   Without correction: 20/20 20/20 20/20   With correction:          Learning Assessment:     Learning Assessment 2014   PRIMARY LEARNER Patient   HIGHEST LEVEL OF EDUCATION - PRIMARY LEARNER  GRADUATED HIGH SCHOOL OR GED   BARRIERS PRIMARY LEARNER NONE   PRIMARY LANGUAGE ENGLISH   LEARNER PREFERENCE PRIMARY VIDEOS   ANSWERED BY PATIENT   RELATIONSHIP SELF     Depression Screening:     3 most recent PHQ Screens 2020   Little interest or pleasure in doing things Not at all   Feeling down, depressed, irritable, or hopeless Not at all   Total Score PHQ 2 0     Fall Risk Assessment:     Fall Risk Assessment, last 12 mths 2020   Able to walk? Yes   Fall in past 12 months? No     Abuse Screening:     Abuse Screening Questionnaire 2020   Do you ever feel afraid of your partner? N   Are you in a relationship with someone who physically or mentally threatens you? N   Is it safe for you to go home? Y     Coordination of Care Questionaire:   1. Have you been to the ER, urgent care clinic since your last visit? Hospitalized since your last visit? NO    2. Have you seen or consulted any other health care providers outside of the [address] [place] [address] since your last visit? Include any pap smears or colon screening. NO    Advanced Directive:   1. Do you have an Advanced Directive? NO    2. Would you like information on Advanced Directives?  NO

## 2020-07-15 LAB
25(OH)D3+25(OH)D2 SERPL-MCNC: 43.6 NG/ML (ref 30–100)
ALBUMIN SERPL-MCNC: 4.7 G/DL (ref 3.8–4.8)
ALBUMIN/CREAT UR: 6 MG/G CREAT (ref 0–29)
ALP SERPL-CCNC: 72 IU/L (ref 39–117)
ALT SERPL-CCNC: 35 IU/L (ref 0–44)
AST SERPL-CCNC: 26 IU/L (ref 0–40)
BILIRUB DIRECT SERPL-MCNC: 0.13 MG/DL (ref 0–0.4)
BILIRUB SERPL-MCNC: 0.4 MG/DL (ref 0–1.2)
CHOLEST SERPL-MCNC: 140 MG/DL (ref 100–199)
CREAT UR-MCNC: 254.2 MG/DL
HBA1C MFR BLD: 6.5 % (ref 4.8–5.6)
HDLC SERPL-MCNC: 34 MG/DL
INTERPRETATION, 910389: NORMAL
LDLC SERPL CALC-MCNC: 68 MG/DL (ref 0–99)
Lab: NORMAL
MICROALBUMIN UR-MCNC: 14.4 UG/ML
PROT SERPL-MCNC: 7.4 G/DL (ref 6–8.5)
PSA SERPL-MCNC: 0.5 NG/ML (ref 0–4)
TRIGL SERPL-MCNC: 192 MG/DL (ref 0–149)
VLDLC SERPL CALC-MCNC: 38 MG/DL (ref 5–40)

## 2020-09-25 RX ORDER — METFORMIN HYDROCHLORIDE 500 MG/1
TABLET ORAL
Qty: 180 TAB | Refills: 1 | Status: SHIPPED | OUTPATIENT
Start: 2020-09-25 | End: 2021-03-23 | Stop reason: SDUPTHER

## 2020-10-05 ENCOUNTER — VIRTUAL VISIT (OUTPATIENT)
Dept: FAMILY MEDICINE CLINIC | Age: 70
End: 2020-10-05
Payer: COMMERCIAL

## 2020-10-05 DIAGNOSIS — D22.9 ATYPICAL MOLE: ICD-10-CM

## 2020-10-05 DIAGNOSIS — K13.0 LESION OF LIP: Primary | ICD-10-CM

## 2020-10-05 PROCEDURE — 99442 PR PHYS/QHP TELEPHONE EVALUATION 11-20 MIN: CPT | Performed by: INTERNAL MEDICINE

## 2020-10-05 NOTE — PROGRESS NOTES
Monserrat Feliciano is a 78 y.o. male, evaluated via audio-only technology on 10/5/2020 for Skin Problem  . Assessment & Plan:   Diagnoses and all orders for this visit:    1. Lesion of lip  -     REFERRAL TO DERMATOLOGY    2. Atypical mole  -     REFERRAL TO DERMATOLOGY        12  Subjective:       Prior to Admission medications    Medication Sig Start Date End Date Taking? Authorizing Provider   metFORMIN (GLUCOPHAGE) 500 mg tablet take 1 tablet by mouth twice a day with food 9/25/20   Adam Kramer MD   simvastatin (ZOCOR) 10 mg tablet take 1 tablet by mouth every evening 9/15/20   Adam Kramer MD   cholecalciferol, vitamin D3, 50 mcg (2,000 unit) tab Take  by mouth daily. Provider, Historical   tadalafiL (CIALIS) 5 mg tablet Take 1 Tab by mouth daily. For ED. Indications: the inability to have an erection 7/20/20   Stefani Carpenter MD   sildenafil citrate (VIAGRA) 100 mg tablet Take 1 Tab by mouth daily as needed for Erectile Dysfunction. 7/20/20   Stefani Carpenter MD   aspirin delayed-release 81 mg tablet Take  by mouth daily. Provider, Historical   glucose blood VI test strips (ONETOUCH ULTRA BLUE TEST STRIP) strip Once daily testing. 2/4/20   Adam Kramer MD   omeprazole (PRILOSEC) 20 mg capsule Take 1 Cap by mouth daily. 4/12/18   Provider, Historical   montelukast (SINGULAIR) 10 mg tablet Take 1 Tab by mouth daily. 4/24/18   Provider, Historical   FIBER THERAPY LAXATIVE, HUSK, 0.52 gram capsule Take 1 Cap by mouth daily. 4/12/18   Provider, Historical   sildenafil citrate (VIAGRA) 100 mg tablet Take 1 Tab by mouth daily as needed for up to 4 doses. 1/19/17   Chelo Everett MD   Lancets [facility] ULTRASOFT LANCETS) misc For once daily testing 8/13/15   Adam Kramer MD   nitroglycerin (NITROSTAT) 0.4 mg SL tablet by SubLINGual route every five (5) minutes as needed for Chest Pain.     Provider, Historical     Patient Active Problem List   Diagnosis Code    Hyperlipidemia E78.5    Lactose intolerance E73.9    Sinus congestion R09.81    CAD (coronary artery disease) I25.10    Vitamin D deficiency E55.9    Low serum testosterone level R79.89    Hiatal hernia K44.9    History of Helicobacter pylori infection Z86.19    Gastric ulcer K25.9    Impotence of organic origin N52.9    Diabetes mellitus (HCC) E11.9    Scrotal abscess D17.6    Umbilical hernia without obstruction and without gangrene K42.9    Nocturia R35.1    Stented coronary artery Z95.5     Current Outpatient Medications   Medication Sig Dispense Refill    metFORMIN (GLUCOPHAGE) 500 mg tablet take 1 tablet by mouth twice a day with food 180 Tab 1    simvastatin (ZOCOR) 10 mg tablet take 1 tablet by mouth every evening 90 Tab 1    cholecalciferol, vitamin D3, 50 mcg (2,000 unit) tab Take  by mouth daily.  tadalafiL (CIALIS) 5 mg tablet Take 1 Tab by mouth daily. For ED. Indications: the inability to have an erection 90 Tab 3    sildenafil citrate (VIAGRA) 100 mg tablet Take 1 Tab by mouth daily as needed for Erectile Dysfunction. 6 Tab 3    aspirin delayed-release 81 mg tablet Take  by mouth daily.  glucose blood VI test strips (ONETOUCH ULTRA BLUE TEST STRIP) strip Once daily testing. 100 Strip 2    omeprazole (PRILOSEC) 20 mg capsule Take 1 Cap by mouth daily. 0    montelukast (SINGULAIR) 10 mg tablet Take 1 Tab by mouth daily. 0    FIBER THERAPY LAXATIVE, HUSK, 0.52 gram capsule Take 1 Cap by mouth daily. 0    sildenafil citrate (VIAGRA) 100 mg tablet Take 1 Tab by mouth daily as needed for up to 4 doses. 6 Tab 12    Lancets (ONETOUCH ULTRASOFT LANCETS) misc For once daily testing 1 Each 5    nitroglycerin (NITROSTAT) 0.4 mg SL tablet by SubLINGual route every five (5) minutes as needed for Chest Pain. Allergies   Allergen Reactions    Amaryl [Glimepiride] Vertigo     Patient reported that this medications made him very dizzy and light headed.  It was discontinued by Dr Skinny Prescott     Past Medical History:   Diagnosis Date    CAD (coronary artery disease)     Constipation     Diabetes mellitus ([facility] [address] 8/7/2015    Diarrhea     Gastric ulcer 12/16/2014    On EGD in 2014.  Hiatal hernia 12/16/2014    On EGD, 2014. With Dr. Lashanda Gonzalez.  History of Helicobacter pylori infection 12/16/2014    On EGD in 2014, Dr. Lashanda Gonzalez. S/p Tx.  Hyperlipidemia     Impotence of organic origin     Lactose intolerance     Low serum testosterone level     Nocturia     Other testicular hypofunction     Scrotal abscess 4/27/2018    Sinus congestion     Stented coronary artery 09/2013.  Umbilical hernia without obstruction and without gangrene 4/27/2018    Vitamin D deficiency      Past Surgical History:   Procedure Laterality Date    HX CORONARY STENT PLACEMENT  09/2013    HX GI  04/02/2018    groin abcess     HX HERNIA REPAIR  2003    inguinial, right     Family History   Problem Relation Age of Onset    Hypertension Mother     Cancer Sister     Diabetes Brother      Social History     Tobacco Use    Smoking status: Former Smoker     Packs/day: 1.00    Smokeless tobacco: Current User   Substance Use Topics    Alcohol use: Yes     Comment: occasionally       Review of Systems   All other systems reviewed and are negative. No flowsheet data found. Arvind Suh, who was evaluated through a patient-initiated, synchronous (real-time) audio only encounter, and/or her healthcare decision maker, is aware that it is a billable service, with coverage as determined by his insurance carrier. He provided verbal consent to proceed: Yes. He has not had a related appointment within my department in the past 7 days or scheduled within the next 24 hours.       Total Time: minutes: 11-20 minutes    Niki Sánchez MD

## 2020-10-19 PROBLEM — N49.2 SCROTAL ABSCESS: Status: ACTIVE | Noted: 2018-04-02

## 2020-10-19 PROBLEM — L02.215 PERINEAL ABSCESS: Status: ACTIVE | Noted: 2018-04-02

## 2020-10-19 PROBLEM — E11.9 CONTROLLED TYPE 2 DIABETES MELLITUS WITHOUT COMPLICATION, WITHOUT LONG-TERM CURRENT USE OF INSULIN (HCC): Status: ACTIVE | Noted: 2020-03-30

## 2020-10-19 PROBLEM — I25.118 CORONARY ARTERY DISEASE OF NATIVE ARTERY OF NATIVE HEART WITH STABLE ANGINA PECTORIS (HCC): Status: ACTIVE | Noted: 2020-03-30

## 2020-10-19 PROBLEM — R01.1 HEART MURMUR: Status: ACTIVE | Noted: 2020-10-19

## 2020-11-28 DIAGNOSIS — E11.9 TYPE 2 DIABETES MELLITUS WITHOUT COMPLICATION (HCC): ICD-10-CM

## 2020-11-29 DIAGNOSIS — E11.9 TYPE 2 DIABETES MELLITUS WITHOUT COMPLICATION (HCC): ICD-10-CM

## 2021-03-25 RX ORDER — METFORMIN HYDROCHLORIDE 500 MG/1
TABLET ORAL
Qty: 180 TAB | Refills: 0 | Status: SHIPPED | OUTPATIENT
Start: 2021-03-25 | End: 2021-06-22 | Stop reason: SDUPTHER

## 2021-03-25 RX ORDER — SIMVASTATIN 10 MG/1
TABLET, FILM COATED ORAL
Qty: 90 TAB | Refills: 0 | Status: SHIPPED | OUTPATIENT
Start: 2021-03-25 | End: 2021-06-22 | Stop reason: SDUPTHER

## 2021-05-09 NOTE — PROGRESS NOTES
HISTORY OF PRESENT ILLNESS  Ryley Palacios is a 78 y.o. male. He presents to establish care and for follow-up with a history of type 2 diabetes, coronary artery disease status post coronary artery stenting, hyperlipidemia and vitamin D deficiency. Today he reports having been treated with a 7-day course of 60 mg of prednisone daily at urgent care which resulted in a marked increase in his glucose levels. These have started to come down. He also reports some concern about abdominal cramping and excess gas as well as deformed discolored painful toenails. Mr#: [id number]      Past Medical History:   Diagnosis Date    CAD (coronary artery disease)     Constipation     Diabetes mellitus ([facility] [address] 8/7/2015    Diarrhea     Gastric ulcer 12/16/2014    On EGD in 2014.  Hiatal hernia 12/16/2014    On EGD, 2014. With Dr. Yvonne Gruber.  History of Helicobacter pylori infection 12/16/2014    On EGD in 2014, Dr. Yvonne Gruber. S/p Tx.  Hyperlipidemia     Impotence of organic origin     Lactose intolerance     Low serum testosterone level     Nocturia     Other testicular hypofunction     Scrotal abscess 4/27/2018    Sinus congestion     Stented coronary artery 09/2013.  Umbilical hernia without obstruction and without gangrene 4/27/2018    Vitamin D deficiency        Past Surgical History:   Procedure Laterality Date    HX COLONOSCOPY  03/20/2017    Tubular adenoma, 5-year follow-up, Dr. Parker Mejía  09/2013    HX GI  04/02/2018    groin abcess     HX HERNIA REPAIR  2003    inguinial, right       Family History   Problem Relation Age of Onset    Hypertension Mother     Cancer Sister     Diabetes Brother        Allergies   Allergen Reactions    Amaryl [Glimepiride] Vertigo     Patient reported that this medications made him very dizzy and light headed.  It was discontinued by Dr Cinthia Roberts History     Tobacco Use   Smoking Status Former Smoker    Packs/day: 1.00 Smokeless Tobacco Never Used       Social History     Substance and Sexual Activity   Alcohol Use Yes    Comment: occasionally           Patient Active Problem List   Diagnosis Code    Hyperlipidemia E78.5    Lactose intolerance E73.9    Sinus congestion R09.81    Coronary artery disease of native artery of native heart with stable angina pectoris ([facility]) I25.118    Vitamin D deficiency E55.9    Low serum testosterone level R79.89    Hiatal hernia K44.9    History of Helicobacter pylori infection Z86.19    Gastric ulcer K25.9    Impotence of organic origin N52.9    Diabetes mellitus ([facility] [address] E11.9    Scrotal abscess V95.2    Umbilical hernia without obstruction and without gangrene K42.9    Nocturia R35.1    Stented coronary artery Z95.5    Chest pain R07.9    Coccydynia M53.3    Heart murmur R01.1    Perineal abscess L02.215    Controlled type 2 diabetes mellitus without complication, without long-term current use of insulin ([facility]) E11.9    Hypercholesterolemia E78.00         Current Outpatient Medications:     simvastatin (ZOCOR) 10 mg tablet, take 1 tablet by mouth every evening, Disp: 90 Tab, Rfl: 0    metFORMIN (GLUCOPHAGE) 500 mg tablet, take 1 tablet by mouth twice a day with food, Disp: 180 Tab, Rfl: 0    OneTouch Ultra Blue Test Strip strip, use 1 TEST STRIP to TEST BLOOD SUGAR once daily, Disp: 100 Strip, Rfl: 2    cholecalciferol, vitamin D3, 50 mcg (2,000 unit) tab, Take  by mouth daily. , Disp: , Rfl:     tadalafiL (CIALIS) 5 mg tablet, Take 1 Tab by mouth daily. For ED. Indications: the inability to have an erection, Disp: 90 Tab, Rfl: 3    aspirin delayed-release 81 mg tablet, Take  by mouth daily. , Disp: , Rfl:     FIBER THERAPY LAXATIVE, HUSK, 0.52 gram capsule, Take 1 Cap by mouth daily. , Disp: , Rfl: 0    sildenafil citrate (VIAGRA) 100 mg tablet, Take 1 Tab by mouth daily as needed for up to 4 doses. , Disp: 6 Tab, Rfl: 12    Lancets (ONETOUCH ULTRASOFT LANCETS) [facility], For once daily testing, Disp: 1 Each, Rfl: 5    nitroglycerin (NITROSTAT) 0.4 mg SL tablet, by SubLINGual route every five (5) minutes as needed for Chest Pain., Disp: , Rfl:          Review of Systems   Constitutional: Negative for fever and weight loss. Respiratory: Negative for shortness of breath. Cardiovascular: Negative for chest pain and palpitations. Gastrointestinal: Positive for heartburn. Genitourinary: Negative for frequency. Skin:        Painful thick deformed nails left foot   Neurological: Negative for tingling and tremors. Endo/Heme/Allergies: Negative for polydipsia. Visit Vitals  /86 (BP 1 Location: Left upper arm, BP Patient Position: Sitting, BP Cuff Size: Adult)   Pulse 78   Temp 97.5 °F (36.4 °C) (Temporal)   Resp 15   Ht 5' 5\" (1.651 m)   Wt 178 lb 12.8 oz (81.1 kg)   SpO2 95%   BMI 29.75 kg/m²       Physical Exam  Vitals signs and nursing note reviewed. Constitutional:       General: He is not in acute distress. Appearance: Normal appearance. He is well-developed. He is not ill-appearing. HENT:      Head: Normocephalic. Eyes:      Extraocular Movements: Extraocular movements intact. Neck:      Musculoskeletal: Neck supple. Cardiovascular:      Rate and Rhythm: Normal rate and regular rhythm. Heart sounds: Normal heart sounds. Pulmonary:      Effort: Pulmonary effort is normal.      Breath sounds: Normal breath sounds. Skin:     General: Skin is warm and dry. Neurological:      Mental Status: He is alert and oriented to person, place, and time. Psychiatric:         Mood and Affect: Mood normal.         Behavior: Behavior normal.         Thought Content: Thought content normal.         ASSESSMENT and PLAN    ICD-10-CM ICD-9-CM    1.  Type 2 diabetes mellitus without complication, without long-term current use of insulin ([facility])  E11.9 250.00 CBC WITH AUTOMATED DIFF      HEMOGLOBIN A1C WITH EAG      MICROALBUMIN, UR, RAND W/ MICROALB/CREAT RATIO METABOLIC PANEL, COMPREHENSIVE   2. Coronary artery disease of native artery of native heart with stable angina pectoris ([facility] [address]  I25.118 414.01 CBC WITH AUTOMATED DIFF     413.9    3. Stented coronary artery  Z95.5 V45.82    4. Hypercholesterolemia  E78.00 272.0 TSH 3RD GENERATION      LIPID PANEL   5. Vitamin D deficiency  E55.9 268.9 VITAMIN D, 25 HYDROXY   6. Toenail deformity  L60.8 703.9 REFERRAL TO PODIATRY   7. Onychomycosis of multiple toenails with type 2 diabetes mellitus (HCC)  E11.69 250.80 REFERRAL TO PODIATRY    B35.1 110.1    8. Prostate cancer screening  Z12.5 V76.44 PSA SCREENING (SCREENING)   Assessment:  Discomfort from bloating and gas  Discolored, deformed and painful toenails  Recent elevation of glucose after high-dose prednisone prescribed at an urgent care clinic  Type 2 diabetes in adequate control as of July 2020  Satisfactory lipid and vitamin D levels as of July 2020  No symptoms of cardiac ischemia    Plan:  Continue current medications  Avoid high gas producing foods as noted in the provided list  Try over-the-counter Phazyme, Gas-X or Beano to help reduce abdominal gas and bloating  Podiatry referral for assistance with management of toenail issues  Due for annual physical exam and lab in July  COVID-19 immunization pleated  PPSV23 immunization declines at present  Shingrix immunization declines at present  Colorectal cancer screening due March 2022    Denisse Rodríguez MD      PLEASE NOTE:   This document has been produced using voice recognition software. Unrecognized errors in transcription may be present.

## 2021-05-10 ENCOUNTER — OFFICE VISIT (OUTPATIENT)
Dept: FAMILY MEDICINE CLINIC | Age: 71
End: 2021-05-10
Payer: COMMERCIAL

## 2021-05-10 VITALS
TEMPERATURE: 97.5 F | SYSTOLIC BLOOD PRESSURE: 122 MMHG | WEIGHT: 178.8 LBS | HEIGHT: 65 IN | HEART RATE: 78 BPM | RESPIRATION RATE: 15 BRPM | OXYGEN SATURATION: 95 % | DIASTOLIC BLOOD PRESSURE: 86 MMHG | BODY MASS INDEX: 29.79 KG/M2

## 2021-05-10 DIAGNOSIS — E11.9 TYPE 2 DIABETES MELLITUS WITHOUT COMPLICATION, WITHOUT LONG-TERM CURRENT USE OF INSULIN (HCC): Primary | ICD-10-CM

## 2021-05-10 DIAGNOSIS — Z95.5 STENTED CORONARY ARTERY: ICD-10-CM

## 2021-05-10 DIAGNOSIS — Z12.5 PROSTATE CANCER SCREENING: ICD-10-CM

## 2021-05-10 DIAGNOSIS — E55.9 VITAMIN D DEFICIENCY: ICD-10-CM

## 2021-05-10 DIAGNOSIS — B35.1 ONYCHOMYCOSIS OF MULTIPLE TOENAILS WITH TYPE 2 DIABETES MELLITUS (HCC): ICD-10-CM

## 2021-05-10 DIAGNOSIS — E11.69 ONYCHOMYCOSIS OF MULTIPLE TOENAILS WITH TYPE 2 DIABETES MELLITUS (HCC): ICD-10-CM

## 2021-05-10 DIAGNOSIS — I25.118 CORONARY ARTERY DISEASE OF NATIVE ARTERY OF NATIVE HEART WITH STABLE ANGINA PECTORIS (HCC): ICD-10-CM

## 2021-05-10 DIAGNOSIS — E78.00 HYPERCHOLESTEROLEMIA: ICD-10-CM

## 2021-05-10 DIAGNOSIS — L60.8 TOENAIL DEFORMITY: ICD-10-CM

## 2021-05-10 PROCEDURE — 99214 OFFICE O/P EST MOD 30 MIN: CPT | Performed by: FAMILY MEDICINE

## 2021-05-10 RX ORDER — OMEPRAZOLE 20 MG/1
20 CAPSULE, DELAYED RELEASE ORAL DAILY
COMMUNITY

## 2021-05-10 NOTE — PROGRESS NOTES
Roxane Mtz is a 78 y.o. male (: 1950) presenting to address:    Chief Complaint   Patient presents with    Physical    Diabetes     c/o abdominal pain       Vitals:    05/10/21 1407   BP: 122/86   Pulse: 78   Resp: 15   Temp: 97.5 °F (36.4 °C)   TempSrc: Temporal   SpO2: 95%   Weight: 178 lb 12.8 oz (81.1 kg)   Height: 5' 5\" (1.651 m)   PainSc:   4   PainLoc: Abdomen       Hearing/Vision:   No exam data present    Learning Assessment:     Learning Assessment 2014   PRIMARY LEARNER Patient   HIGHEST LEVEL OF EDUCATION - PRIMARY LEARNER  GRADUATED HIGH SCHOOL OR GED   BARRIERS PRIMARY LEARNER NONE   PRIMARY LANGUAGE ENGLISH   LEARNER PREFERENCE PRIMARY VIDEOS   ANSWERED BY PATIENT   RELATIONSHIP SELF     Depression Screening:     3 most recent PHQ Screens 5/10/2021   Diana Montiel [address] Not Done Patient Decline   Little interest or pleasure in doing things Not at all   Feeling down, depressed, irritable, or hopeless Not at all   Total Score PHQ 2 0     Fall Risk Assessment:     Fall Risk Assessment, last 12 mths 5/10/2021   Able to walk? Yes   Fall in past 12 months? 0   Do you feel unsteady? 0   Are you worried about falling 0     Abuse Screening:     Abuse Screening Questionnaire 2020   Do you ever feel afraid of your partner? N   Are you in a relationship with someone who physically or mentally threatens you? N   Is it safe for you to go home? Y     Coordination of Care Questionaire:   1. Have you been to the ER, urgent care clinic since your last visit? Hospitalized since your last visit? YES, [facility] for [facility] infection    2. Have you seen or consulted any other health care providers outside of the [address] [place] [address] since your last visit? Include any pap smears or colon screening. NO    Advanced Directive:   1. Do you have an Advanced Directive? NO    2. Would you like information on Advanced Directives?  NO

## 2021-05-10 NOTE — PATIENT INSTRUCTIONS
Continue current medications Avoid high gas producing foods as noted in the provided list 
Try over-the-counter Phazyme, Gas-X or Beano to help reduce abdominal gas and bloating Podiatry referral for assistance with management of toenail issues Due for annual physical exam and lab in July COVID-19 immunization pleated PPSV23 immunization declines at present Shingrix immunization declines at present Colorectal cancer screening due March 2022

## 2021-06-21 RX ORDER — SIMVASTATIN 10 MG/1
TABLET, FILM COATED ORAL
Qty: 90 TABLET | Refills: 0 | OUTPATIENT
Start: 2021-06-21

## 2021-06-21 RX ORDER — METFORMIN HYDROCHLORIDE 500 MG/1
TABLET ORAL
Qty: 180 TABLET | Refills: 0 | OUTPATIENT
Start: 2021-06-21

## 2021-06-22 RX ORDER — SIMVASTATIN 10 MG/1
TABLET, FILM COATED ORAL
Qty: 90 TABLET | Refills: 0 | Status: SHIPPED | OUTPATIENT
Start: 2021-06-22 | End: 2021-09-16

## 2021-06-22 RX ORDER — METFORMIN HYDROCHLORIDE 500 MG/1
TABLET ORAL
Qty: 180 TABLET | Refills: 0 | Status: SHIPPED | OUTPATIENT
Start: 2021-06-22 | End: 2021-09-16

## 2021-06-22 NOTE — TELEPHONE ENCOUNTER
Pharmacy is requesting refill on metformin and simvastatin last refilled 3/25/21 for 90 days . Patient was seen 5/10/21 to establish care with Dr Jewel Hernández .

## 2021-08-03 ENCOUNTER — APPOINTMENT (OUTPATIENT)
Dept: FAMILY MEDICINE CLINIC | Age: 71
End: 2021-08-03

## 2021-08-03 DIAGNOSIS — E11.9 TYPE 2 DIABETES MELLITUS WITHOUT COMPLICATION, WITHOUT LONG-TERM CURRENT USE OF INSULIN (HCC): ICD-10-CM

## 2021-08-03 DIAGNOSIS — I25.118 CORONARY ARTERY DISEASE OF NATIVE ARTERY OF NATIVE HEART WITH STABLE ANGINA PECTORIS (HCC): ICD-10-CM

## 2021-08-03 DIAGNOSIS — E55.9 VITAMIN D DEFICIENCY: ICD-10-CM

## 2021-08-03 DIAGNOSIS — E78.00 HYPERCHOLESTEROLEMIA: ICD-10-CM

## 2021-08-03 DIAGNOSIS — Z12.5 PROSTATE CANCER SCREENING: ICD-10-CM

## 2021-08-04 LAB
25(OH)D3+25(OH)D2 SERPL-MCNC: 30.5 NG/ML (ref 30–100)
ALBUMIN SERPL-MCNC: 4.4 G/DL (ref 3.8–4.8)
ALBUMIN/CREAT UR: 6 MG/G CREAT (ref 0–29)
ALBUMIN/GLOB SERPL: 1.8 {RATIO} (ref 1.2–2.2)
ALP SERPL-CCNC: 90 IU/L (ref 48–121)
ALT SERPL-CCNC: 22 IU/L (ref 0–44)
AST SERPL-CCNC: 19 IU/L (ref 0–40)
BASOPHILS # BLD AUTO: 0 X10E3/UL (ref 0–0.2)
BASOPHILS NFR BLD AUTO: 0 %
BILIRUB SERPL-MCNC: 0.4 MG/DL (ref 0–1.2)
BUN SERPL-MCNC: 22 MG/DL (ref 8–27)
BUN/CREAT SERPL: 18 (ref 10–24)
CALCIUM SERPL-MCNC: 9.3 MG/DL (ref 8.6–10.2)
CHLORIDE SERPL-SCNC: 103 MMOL/L (ref 96–106)
CHOLEST SERPL-MCNC: 150 MG/DL (ref 100–199)
CO2 SERPL-SCNC: 23 MMOL/L (ref 20–29)
CREAT SERPL-MCNC: 1.24 MG/DL (ref 0.76–1.27)
CREAT UR-MCNC: 182 MG/DL
EOSINOPHIL # BLD AUTO: 0.1 X10E3/UL (ref 0–0.4)
EOSINOPHIL NFR BLD AUTO: 2 %
ERYTHROCYTE [DISTWIDTH] IN BLOOD BY AUTOMATED COUNT: 13.1 % (ref 11.6–15.4)
EST. AVERAGE GLUCOSE BLD GHB EST-MCNC: 183 MG/DL
GLOBULIN SER CALC-MCNC: 2.4 G/DL (ref 1.5–4.5)
GLUCOSE SERPL-MCNC: 140 MG/DL (ref 65–99)
HBA1C MFR BLD: 8 % (ref 4.8–5.6)
HCT VFR BLD AUTO: 41.1 % (ref 37.5–51)
HDLC SERPL-MCNC: 29 MG/DL
HGB BLD-MCNC: 14.1 G/DL (ref 13–17.7)
IMM GRANULOCYTES # BLD AUTO: 0 X10E3/UL (ref 0–0.1)
IMM GRANULOCYTES NFR BLD AUTO: 0 %
IMP & REVIEW OF LAB RESULTS: NORMAL
INTERPRETATION: NORMAL
LDLC SERPL CALC-MCNC: 62 MG/DL (ref 0–99)
LYMPHOCYTES # BLD AUTO: 2.6 X10E3/UL (ref 0.7–3.1)
LYMPHOCYTES NFR BLD AUTO: 47 %
MCH RBC QN AUTO: 31.3 PG (ref 26.6–33)
MCHC RBC AUTO-ENTMCNC: 34.3 G/DL (ref 31.5–35.7)
MCV RBC AUTO: 91 FL (ref 79–97)
MICROALBUMIN UR-MCNC: 10.4 UG/ML
MONOCYTES # BLD AUTO: 0.4 X10E3/UL (ref 0.1–0.9)
MONOCYTES NFR BLD AUTO: 7 %
MORPHOLOGY BLD-IMP: ABNORMAL
NEUTROPHILS # BLD AUTO: 2.4 X10E3/UL (ref 1.4–7)
NEUTROPHILS NFR BLD AUTO: 44 %
PLATELET # BLD AUTO: 109 X10E3/UL (ref 150–450)
POTASSIUM SERPL-SCNC: 3.9 MMOL/L (ref 3.5–5.2)
PROT SERPL-MCNC: 6.8 G/DL (ref 6–8.5)
PSA SERPL-MCNC: 0.4 NG/ML (ref 0–4)
RBC # BLD AUTO: 4.51 X10E6/UL (ref 4.14–5.8)
SODIUM SERPL-SCNC: 140 MMOL/L (ref 134–144)
TRIGL SERPL-MCNC: 379 MG/DL (ref 0–149)
TSH SERPL DL<=0.005 MIU/L-ACNC: 1.2 UIU/ML (ref 0.45–4.5)
VLDLC SERPL CALC-MCNC: 59 MG/DL (ref 5–40)
WBC # BLD AUTO: 5.5 X10E3/UL (ref 3.4–10.8)

## 2021-08-07 NOTE — PROGRESS NOTES
HISTORY OF PRESENT ILLNESS  Ledon Gottron is a 78 y.o. male. He presents for health assessment, preventative care and follow-up with a history of type 2 diabetes, hyperlipidemia, coronary artery disease status post PTCA and stent placement followed by cardiology, and vitamin D deficiency. He was seen in cardiology follow-up on 7/23/2021 with no significant issues noted and echocardiogram favorable compared with previous. Mr#: [id number]      Past Medical History:   Diagnosis Date    CAD (coronary artery disease)     Constipation     Diabetes mellitus ([address] 8/7/2015    Diarrhea     Gastric ulcer 12/16/2014    On EGD in 2014.  Hiatal hernia 12/16/2014    On EGD, 2014. With Dr. Marina Hodgkin.  History of Helicobacter pylori infection 12/16/2014    On EGD in 2014, Dr. Marina Hodgkin. S/p Tx.  Hyperlipidemia     Impotence of organic origin     Lactose intolerance     Low serum testosterone level     Nocturia     Other testicular hypofunction     Scrotal abscess 4/27/2018    Sinus congestion     Stented coronary artery 09/2013.  Umbilical hernia without obstruction and without gangrene 4/27/2018    Vitamin D deficiency        Past Surgical History:   Procedure Laterality Date    HX COLONOSCOPY  03/20/2017    Tubular adenoma, 5-year follow-up, Dr. Ilia Finch  09/2013    HX GI  04/02/2018    groin abcess     HX HERNIA REPAIR  2003    inguinial, right       Family History   Problem Relation Age of Onset    Hypertension Mother     Cancer Sister     Diabetes Brother        Allergies   Allergen Reactions    Amaryl [Glimepiride] Vertigo     Patient reported that this medications made him very dizzy and light headed.  It was discontinued by Dr Frances Doe       Social History     Tobacco Use   Smoking Status Former Smoker    Packs/day: 1.00   Smokeless Tobacco Never Used       Social History     Substance and Sexual Activity   Alcohol Use Yes    Comment: occasionally Patient Active Problem List   Diagnosis Code    Hyperlipidemia E78.5    Lactose intolerance E73.9    Sinus congestion R09.81    Coronary artery disease of native artery of native heart with stable angina pectoris ([facility]) I25.118    Vitamin D deficiency E55.9    Low serum testosterone level R79.89    Hiatal hernia K44.9    History of Helicobacter pylori infection Z86.19    Gastric ulcer K25.9    Impotence of organic origin N52.9    Diabetes mellitus ([facility] E11.9    Scrotal abscess X51.9    Umbilical hernia without obstruction and without gangrene K42.9    Nocturia R35.1    Stented coronary artery Z95.5    Chest pain R07.9    Coccydynia M53.3    Heart murmur R01.1    Perineal abscess L02.215    Controlled type 2 diabetes mellitus without complication, without long-term current use of insulin ([facility]) E11.9    Hypercholesterolemia E78.00         Current Outpatient Medications:     simvastatin (ZOCOR) 10 mg tablet, take 1 tablet by mouth every evening, Disp: 90 Tablet, Rfl: 0    metFORMIN (GLUCOPHAGE) 500 mg tablet, take 1 tablet by mouth twice a day with food, Disp: 180 Tablet, Rfl: 0    omeprazole (PRILOSEC) 20 mg capsule, Take 20 mg by mouth daily. , Disp: , Rfl:     OneTouch Ultra Blue Test Strip strip, use 1 TEST STRIP to TEST BLOOD SUGAR once daily, Disp: 100 Strip, Rfl: 2    cholecalciferol, vitamin D3, 50 mcg (2,000 unit) tab, Take  by mouth daily. , Disp: , Rfl:     tadalafiL (CIALIS) 5 mg tablet, Take 1 Tab by mouth daily. For ED. Indications: the inability to have an erection, Disp: 90 Tab, Rfl: 3    aspirin delayed-release 81 mg tablet, Take  by mouth daily. , Disp: , Rfl:     FIBER THERAPY LAXATIVE, HUSK, 0.52 gram capsule, Take 1 Cap by mouth daily. , Disp: , Rfl: 0    sildenafil citrate (VIAGRA) 100 mg tablet, Take 1 Tab by mouth daily as needed for up to 4 doses. , Disp: 6 Tab, Rfl: 12    Lancets (ONETOUCH ULTRASOFT LANCETS) [facility], For once daily testing, Disp: 1 Each, Rfl: 5    nitroglycerin (NITROSTAT) 0.4 mg SL tablet, by SubLINGual route every five (5) minutes as needed for Chest Pain., Disp: , Rfl:          Review of Systems   Constitutional: Negative for chills, fever and weight loss. HENT: Negative for congestion, ear pain, hearing loss and sore throat. Eyes: Negative for blurred vision and double vision. Respiratory: Negative for cough, shortness of breath and wheezing. Cardiovascular: Negative for chest pain, palpitations and leg swelling. Gastrointestinal: Positive for heartburn. Negative for abdominal pain, blood in stool, constipation, diarrhea, melena, nausea and vomiting. Genitourinary: Negative for dysuria and urgency. Musculoskeletal: Positive for myalgias (episodic cramps right calf). Negative for joint pain. Skin: Negative for itching and rash. Neurological: Negative for dizziness, tingling, sensory change, focal weakness and headaches. Endo/Heme/Allergies: Positive for environmental allergies. Psychiatric/Behavioral: Negative for depression. The patient is not nervous/anxious and does not have insomnia. Visit Vitals  /78 (BP 1 Location: Right arm, BP Patient Position: Sitting, BP Cuff Size: Adult)   Pulse (!) 57   Temp 97.9 °F (36.6 °C) (Temporal)   Resp 15   Ht 5' 5\" (1.651 m)   Wt 182 lb 14.4 oz (83 kg)   SpO2 97%   BMI 30.44 kg/m²       Physical Exam  Vitals and nursing note reviewed. Constitutional:       General: He is not in acute distress. Appearance: Normal appearance. He is not ill-appearing. HENT:      Head: Normocephalic. Right Ear: Tympanic membrane, ear canal and external ear normal.      Left Ear: Tympanic membrane, ear canal and external ear normal.   Eyes:      Extraocular Movements: Extraocular movements intact. Conjunctiva/sclera: Conjunctivae normal.      Pupils: Pupils are equal, round, and reactive to light. Neck:      Vascular: No carotid bruit.    Cardiovascular:      Rate and Rhythm: Normal rate and regular rhythm. Heart sounds: Normal heart sounds. Pulmonary:      Effort: Pulmonary effort is normal.      Breath sounds: Normal breath sounds. Abdominal:      Palpations: Abdomen is soft. Tenderness: There is no abdominal tenderness. Musculoskeletal:         General: No deformity. Cervical back: Neck supple. Right lower leg: No edema. Left lower leg: No edema. Skin:     General: Skin is warm and dry. Neurological:      General: No focal deficit present. Mental Status: He is alert and oriented to person, place, and time. Psychiatric:         Mood and Affect: Mood normal.         Behavior: Behavior normal.            Diabetic foot exam performed by Huong Espinosa MD     Measurement  Response Nurse Comment Physician Comment   Monofilament  R - normal sensation with micro filament  L - normal sensation with micro filament     Pulse DP R - 2+ (normal)  L - 2+ (normal)     Pulse TP R - 2+ (normal)  L - 2+ (normal)     Structural deformity R - None  L - None     Skin Integrity / Deformity R - None  L - None        Reviewed by:               Results for [facility] (MRN [id number]) as of 8/7/2021 15:41   Ref. Range 7/10/2020 08:40 7/14/2020 09:11 8/3/2021 00:00   WBC Latest Ref Range: 3.4 - 10.8 x10E3/uL 5.6  5.5   RBC Latest Ref Range: 4.14 - 5.80 x10E6/uL 4.45  4.51   HGB Latest Ref Range: 13.0 - 17.7 g/dL 13.8  14.1   HCT Latest Ref Range: 37.5 - 51.0 % 40.2  41.1   MCV Latest Ref Range: 79 - 97 fL 90  91   MCH Latest Ref Range: 26.6 - 33.0 pg 31.0  31.3   MCHC Latest Ref Range: 31.5 - 35.7 g/dL 34.3  34.3   RDW Latest Ref Range: 11.6 - 15.4 % 13.4  13.1   PLATELET Latest Ref Range: 150 - 450 x10E3/uL 131 (L)  109 (L)   NEUTROPHILS Latest Ref Range: Not Estab. % 38  44   Lymphocytes Latest Ref Range: Not Estab. % 50  47   MONOCYTES Latest Ref Range: Not Estab. % 10  7   EOSINOPHILS Latest Ref Range: Not Estab. % 1  2   BASOPHILS Latest Ref Range: Not Estab. % 1  0   IMMATURE GRANULOCYTES Latest Ref Range: Not Estab. % 0  0   ABS. NEUTROPHILS Latest Ref Range: 1.4 - 7.0 x10E3/uL 2.1  2.4   ABS. IMM. GRANS. Latest Ref Range: 0.0 - 0.1 x10E3/uL 0.0  0.0   Abs Lymphocytes Latest Ref Range: 0.7 - 3.1 x10E3/uL 2.8  2.6   ABS. MONOCYTES Latest Ref Range: 0.1 - 0.9 x10E3/uL 0.5  0.4   ABS. EOSINOPHILS Latest Ref Range: 0.0 - 0.4 x10E3/uL 0.1  0.1   ABS. BASOPHILS Latest Ref Range: 0.0 - 0.2 x10E3/uL 0.0  0.0   Sodium Latest Ref Range: 134 - 144 mmol/L 140  140   Potassium Latest Ref Range: 3.5 - 5.2 mmol/L 4.2  3.9   Chloride Latest Ref Range: 96 - 106 mmol/L 103  103   CO2 Latest Ref Range: 20 - 29 mmol/L 23  23   Glucose Latest Ref Range: 65 - 99 mg/dL 127 (H)  140 (H)   BUN Latest Ref Range: 8 - 27 mg/dL 19  22   Creatinine Latest Ref Range: 0.76 - 1.27 mg/dL 1.24  1.24   BUN/Creatinine ratio Latest Ref Range: 10 - 24  15  18   Calcium Latest Ref Range: 8.6 - 10.2 mg/dL 9.4  9.3   GFR est non-AA Latest Ref Range: >59 mL/min/1.73 60  59 (L)   GFR est AA Latest Ref Range: >59 mL/min/1.73 69  69   Bilirubin, total Latest Ref Range: 0.0 - 1.2 mg/dL  0.4 0.4   Bilirubin, direct Latest Ref Range: 0.00 - 0.40 mg/dL  0.13    Protein, total Latest Ref Range: 6.0 - 8.5 g/dL  7.4 6.8   Albumin Latest Ref Range: 3.8 - 4.8 g/dL  4.7 4.4   A-G Ratio Latest Ref Range: 1.2 - 2.2    1.8   ALT Latest Ref Range: 0 - 44 IU/L  35 22   AST Latest Ref Range: 0 - 40 IU/L  26 19   Alk.  phosphatase Latest Ref Range: 48 - 121 IU/L  72 90   Triglyceride Latest Ref Range: 0 - 149 mg/dL  192 (H) 379 (H)   Cholesterol, total Latest Ref Range: 100 - 199 mg/dL  140 150   HDL Cholesterol Latest Ref Range: >39 mg/dL  34 (L) 29 (L)   VLDL, calculated Latest Ref Range: 5 - 40 mg/dL  38 59 (H)   LDL, calculated Latest Ref Range: 0 - 99 mg/dL  68 62   Hemoglobin A1c, (calculated) Latest Ref Range: 4.8 - 5.6 %  6.5 (H) 8.0 (H)   Estimated average glucose Latest Units: mg/dL   183   Creatinine, urine Latest Ref Range: Not Estab. mg/dL  254.2 182.0   Microalbumin, urine Latest Ref Range: Not Estab. ug/mL  14.4 10.4   Microalbumin/Creat. Ratio Latest Ref Range: 0 - 29 mg/g creat  6 6   T4, Free Latest Ref Range: 0.82 - 1.77 ng/dL 1.18     TSH Latest Ref Range: 0.450 - 4.500 uIU/mL 1.310  1.200   Prostate Specific Ag Latest Ref Range: 0.0 - 4.0 ng/mL  0.5 0.4   VITAMIN D, 25-HYDROXY Latest Ref Range: 30.0 - 100.0 ng/mL  43.6 30.5     ASSESSMENT and PLAN    ICD-10-CM ICD-9-CM    1. Routine general medical examination at a health care facility  Z00.00 V70.0    2. Type 2 diabetes mellitus with hyperglycemia, without long-term current use of insulin (HCC)  E11.65 250.00      790.29    3. Hypercholesterolemia  E78.00 272.0    4. Coronary artery disease of native artery of native heart with stable angina pectoris ([facility] [address]  I25.118 414.01      413.9    5. Vitamin D deficiency  E55.9 268.9    Assessment:  Type 2 diabetes less well controlled than previously  Hyperlipidemia well-controlled but with low HDL  Coronary artery disease without symptoms of ischemia and satisfactory recent echocardiogram  Vitamin D deficiency with vitamin D level at the lower limits of normal    Health maintenance recommendations:  Shingrix immunization to reduce risk of shingles-declined in 5/2021  PPSV23 immunization-declined in 5/2021  Influenza immunization recommended fall 2021    Plan:  Increase in dose of Metformin recommended but declined by the patient who feels he can regain good diabetic control with lifestyle modification  Follow carbohydrate counting guidelines  Office follow-up with hemoglobin A1c in 3 months  Avoid dietary starch and sugar and follow program of regular aerobic exercise    Fabby Quintanilla MD      PLEASE NOTE:   This document has been produced using voice recognition software. Unrecognized errors in transcription may be present.

## 2021-08-09 ENCOUNTER — OFFICE VISIT (OUTPATIENT)
Dept: FAMILY MEDICINE CLINIC | Age: 71
End: 2021-08-09
Payer: COMMERCIAL

## 2021-08-09 VITALS
WEIGHT: 182.9 LBS | OXYGEN SATURATION: 97 % | HEIGHT: 65 IN | RESPIRATION RATE: 15 BRPM | SYSTOLIC BLOOD PRESSURE: 126 MMHG | BODY MASS INDEX: 30.47 KG/M2 | DIASTOLIC BLOOD PRESSURE: 78 MMHG | TEMPERATURE: 97.9 F | HEART RATE: 57 BPM

## 2021-08-09 DIAGNOSIS — E55.9 VITAMIN D DEFICIENCY: ICD-10-CM

## 2021-08-09 DIAGNOSIS — I25.118 CORONARY ARTERY DISEASE OF NATIVE ARTERY OF NATIVE HEART WITH STABLE ANGINA PECTORIS (HCC): ICD-10-CM

## 2021-08-09 DIAGNOSIS — E78.00 HYPERCHOLESTEROLEMIA: ICD-10-CM

## 2021-08-09 DIAGNOSIS — Z00.00 ROUTINE GENERAL MEDICAL EXAMINATION AT A HEALTH CARE FACILITY: Primary | ICD-10-CM

## 2021-08-09 DIAGNOSIS — E11.65 TYPE 2 DIABETES MELLITUS WITH HYPERGLYCEMIA, WITHOUT LONG-TERM CURRENT USE OF INSULIN (HCC): ICD-10-CM

## 2021-08-09 PROCEDURE — 3052F HG A1C>EQUAL 8.0%<EQUAL 9.0%: CPT | Performed by: FAMILY MEDICINE

## 2021-08-09 PROCEDURE — 99397 PER PM REEVAL EST PAT 65+ YR: CPT | Performed by: FAMILY MEDICINE

## 2021-08-09 NOTE — PATIENT INSTRUCTIONS
Follow carbohydrate counting guidelines Office follow-up with hemoglobin A1c in 3 months Avoid dietary starch and sugar and follow program of regular aerobic exercise

## 2021-08-09 NOTE — PROGRESS NOTES
Yobani Lew presents today for   Chief Complaint   Patient presents with    Physical     Visit Vitals  /78 (BP 1 Location: Right arm, BP Patient Position: Sitting, BP Cuff Size: Adult)   Pulse (!) 57   Temp 97.9 °F (36.6 °C) (Temporal)   Resp 15   Ht 5' 5\" (1.651 m)   Wt 182 lb 14.4 oz (83 kg)   SpO2 97%   BMI 30.44 kg/m²         Is someone accompanying this pt? no    Is the patient using any DME equipment during OV? no    Depression Screening:  3 most recent PHQ Screens 8/9/2021   PHQ Not Done Patient Decline   Little interest or pleasure in doing things Not at all   Feeling down, depressed, irritable, or hopeless Not at all   Total Score PHQ 2 0       Learning Assessment:  Learning Assessment 5/10/2021   PRIMARY LEARNER Patient   HIGHEST LEVEL OF EDUCATION - PRIMARY LEARNER  GRADUATED HIGH SCHOOL OR GED   BARRIERS PRIMARY LEARNER NONE   CO-LEARNER CAREGIVER No   PRIMARY LANGUAGE ENGLISH    NEED No   LEARNER PREFERENCE PRIMARY PICTURES   ANSWERED BY patient   RELATIONSHIP SELF       Travel Screening:    Travel Screening     Question   Response    In the last month, have you been in contact with someone who was confirmed or suspected to have [address] / COVID-19? No / Unsure    Have you had a COVID-19 viral test in the last 14 days? No    Do you have any of the following new or worsening symptoms? None of these    Have you traveled internationally or domestically in the last month? No      Travel History   Travel since 07/09/21     No documented travel since 07/09/21          Health Maintenance reviewed and discussed and ordered per Provider. Health Maintenance Due   Topic Date Due    Shingrix Vaccine Age 49> (1 of 2) Never done    Pneumococcal 65+ years (1 of 1 - PPSV23) Never done    Foot Exam Q1  05/15/2020   . Coordination of Care:  1. Have you been to the ER, urgent care clinic since your last visit? Hospitalized since your last visit? no    2.  Have you seen or consulted any other health care providers outside of the [address] [place] [address] since your last visit? Include any pap smears or colon screening.  no

## 2021-09-16 RX ORDER — SIMVASTATIN 10 MG/1
TABLET, FILM COATED ORAL
Qty: 90 TABLET | Refills: 0 | Status: SHIPPED | OUTPATIENT
Start: 2021-09-16 | End: 2021-12-19

## 2021-09-16 RX ORDER — METFORMIN HYDROCHLORIDE 500 MG/1
TABLET ORAL
Qty: 180 TABLET | Refills: 0 | Status: SHIPPED | OUTPATIENT
Start: 2021-09-16 | End: 2021-11-11 | Stop reason: DRUGHIGH

## 2021-09-25 DIAGNOSIS — E11.9 TYPE 2 DIABETES MELLITUS WITHOUT COMPLICATION (HCC): ICD-10-CM

## 2021-09-25 RX ORDER — BLOOD SUGAR DIAGNOSTIC
STRIP MISCELLANEOUS
Qty: 100 STRIP | Refills: 2 | Status: SHIPPED | OUTPATIENT
Start: 2021-09-25

## 2021-11-11 ENCOUNTER — OFFICE VISIT (OUTPATIENT)
Dept: FAMILY MEDICINE CLINIC | Age: 71
End: 2021-11-11
Payer: COMMERCIAL

## 2021-11-11 VITALS
WEIGHT: 184 LBS | DIASTOLIC BLOOD PRESSURE: 80 MMHG | HEIGHT: 65 IN | RESPIRATION RATE: 16 BRPM | SYSTOLIC BLOOD PRESSURE: 124 MMHG | HEART RATE: 79 BPM | TEMPERATURE: 97.4 F | BODY MASS INDEX: 30.66 KG/M2 | OXYGEN SATURATION: 96 %

## 2021-11-11 DIAGNOSIS — E78.00 HYPERCHOLESTEROLEMIA: ICD-10-CM

## 2021-11-11 DIAGNOSIS — E11.65 TYPE 2 DIABETES MELLITUS WITH HYPERGLYCEMIA, WITHOUT LONG-TERM CURRENT USE OF INSULIN (HCC): Primary | ICD-10-CM

## 2021-11-11 DIAGNOSIS — I25.118 CORONARY ARTERY DISEASE OF NATIVE ARTERY OF NATIVE HEART WITH STABLE ANGINA PECTORIS (HCC): ICD-10-CM

## 2021-11-11 LAB — HBA1C MFR BLD HPLC: 8.8 %

## 2021-11-11 PROCEDURE — 3052F HG A1C>EQUAL 8.0%<EQUAL 9.0%: CPT | Performed by: FAMILY MEDICINE

## 2021-11-11 PROCEDURE — 83036 HEMOGLOBIN GLYCOSYLATED A1C: CPT | Performed by: FAMILY MEDICINE

## 2021-11-11 PROCEDURE — 99213 OFFICE O/P EST LOW 20 MIN: CPT | Performed by: FAMILY MEDICINE

## 2021-11-11 RX ORDER — METFORMIN HYDROCHLORIDE 1000 MG/1
1000 TABLET ORAL 2 TIMES DAILY WITH MEALS
Qty: 180 TABLET | Refills: 1 | Status: SHIPPED | OUTPATIENT
Start: 2021-11-11 | End: 2022-05-08

## 2021-11-11 NOTE — PROGRESS NOTES
HISTORY OF PRESENT ILLNESS  Jerica Ziegler is a 73 y.o. male. He returns for follow-up of suboptimally controlled type 2 diabetes history of hyperlipidemia and coronary artery disease following an appointment 3 months ago at which time he was advised to Increase in dose of Metformin but declined by the preferring to attempt to regain good diabetic control with lifestyle modification  Follow carbohydrate counting guidelines  Office follow-up with hemoglobin A1c in 3 months  Avoid dietary starch and sugar and follow program of regular aerobic exercise    Mr#: [id number]      Past Medical History:   Diagnosis Date    CAD (coronary artery disease)     Constipation     Diabetes mellitus ([address] 8/7/2015    Diarrhea     Gastric ulcer 12/16/2014    On EGD in 2014.  Hiatal hernia 12/16/2014    On EGD, 2014. With Dr. Jessie Zarate.  History of Helicobacter pylori infection 12/16/2014    On EGD in 2014, Dr. Jessie Zarate. S/p Tx.  Hyperlipidemia     Impotence of organic origin     Lactose intolerance     Low serum testosterone level     Nocturia     Other testicular hypofunction     Scrotal abscess 4/27/2018    Sinus congestion     Stented coronary artery 09/2013.  Umbilical hernia without obstruction and without gangrene 4/27/2018    Vitamin D deficiency        Past Surgical History:   Procedure Laterality Date    HX COLONOSCOPY  03/20/2017    Tubular adenoma, 5-year follow-up, Dr. Bird Zelaya  09/2013    HX GI  04/02/2018    groin abcess     HX HERNIA REPAIR  2003    inguinial, right       Family History   Problem Relation Age of Onset    Hypertension Mother     Cancer Sister     Diabetes Brother        Allergies   Allergen Reactions    Amaryl [Glimepiride] Vertigo     Patient reported that this medications made him very dizzy and light headed.  It was discontinued by Dr Waters Pratt History     Tobacco Use   Smoking Status Former Smoker    Packs/day: 1.00 Smokeless Tobacco Never Used       Social History     Substance and Sexual Activity   Alcohol Use Yes    Comment: occasionally           Patient Active Problem List   Diagnosis Code    Hyperlipidemia E78.5    Lactose intolerance E73.9    Sinus congestion R09.81    Coronary artery disease of native artery of native heart with stable angina pectoris ([facility]) I25.118    Vitamin D deficiency E55.9    Low serum testosterone level R79.89    Hiatal hernia K44.9    History of Helicobacter pylori infection Z86.19    Gastric ulcer K25.9    Impotence of organic origin N52.9    Diabetes mellitus ([facility] E11.9    Scrotal abscess T33.0    Umbilical hernia without obstruction and without gangrene K42.9    Nocturia R35.1    Stented coronary artery Z95.5    Chest pain R07.9    Coccydynia M53.3    Heart murmur R01.1    Perineal abscess L02.215    Controlled type 2 diabetes mellitus without complication, without long-term current use of insulin ([facility]) E11.9    Hypercholesterolemia E78.00         Current Outpatient Medications:     OneTouch Ultra Test strip, use 1 TEST STRIP to TEST BLOOD SUGAR once daily, Disp: 100 Strip, Rfl: 2    simvastatin (ZOCOR) 10 mg tablet, take 1 tablet by mouth every evening, Disp: 90 Tablet, Rfl: 0    metFORMIN (GLUCOPHAGE) 500 mg tablet, take 1 tablet by mouth twice a day with food, Disp: 180 Tablet, Rfl: 0    omeprazole (PRILOSEC) 20 mg capsule, Take 20 mg by mouth daily. , Disp: , Rfl:     cholecalciferol, vitamin D3, 50 mcg (2,000 unit) tab, Take  by mouth daily. , Disp: , Rfl:     tadalafiL (CIALIS) 5 mg tablet, Take 1 Tab by mouth daily. For ED. Indications: the inability to have an erection, Disp: 90 Tab, Rfl: 3    aspirin delayed-release 81 mg tablet, Take  by mouth daily. , Disp: , Rfl:     FIBER THERAPY LAXATIVE, HUSK, 0.52 gram capsule, Take 1 Cap by mouth daily. , Disp: , Rfl: 0    sildenafil citrate (VIAGRA) 100 mg tablet, Take 1 Tab by mouth daily as needed for up to 4 doses., Disp: 6 Tab, Rfl: 12    Lancets (ONETOUCH ULTRASOFT LANCETS) [facility] [place] [facility], For once daily testing, Disp: 1 Each, Rfl: 5    nitroglycerin (NITROSTAT) 0.4 mg SL tablet, by SubLINGual route every five (5) minutes as needed for Chest Pain., Disp: , Rfl:          Review of Systems   Constitutional: Negative for fever and weight loss. Eyes: Negative for blurred vision. Respiratory: Negative for shortness of breath. Cardiovascular: Negative for chest pain and palpitations. Genitourinary: Negative for frequency. Neurological: Negative for dizziness, tingling and headaches. Endo/Heme/Allergies: Negative for polydipsia. Visit Vitals  /80   Pulse 79   Temp 97.4 °F (36.3 °C) (Temporal)   Resp 16   Ht 5' 5\" (1.651 m)   Wt 184 lb (83.5 kg)   SpO2 96%   BMI 30.62 kg/m²       Physical Exam  Vitals and nursing note reviewed. Constitutional:       General: He is not in acute distress. Appearance: Normal appearance. He is well-developed. He is not ill-appearing. HENT:      Head: Normocephalic. Eyes:      Extraocular Movements: Extraocular movements intact. Cardiovascular:      Rate and Rhythm: Normal rate. Pulmonary:      Effort: Pulmonary effort is normal.   Neurological:      Mental Status: He is alert and oriented to person, place, and time. Psychiatric:         Mood and Affect: Mood normal.         Behavior: Behavior normal.         Thought Content: Thought content normal.       Results for [facility] (MRN [id number]) as of 11/11/2021 14:09   Ref.  Range 1/20/2020 10:28 7/10/2020 08:40 7/14/2020 09:11 8/3/2021 00:00 11/11/2021 14:03   Triglyceride Latest Ref Range: 0 - 149 mg/dL   192 (H) 379 (H)    Cholesterol, total Latest Ref Range: 100 - 199 mg/dL   140 150    HDL Cholesterol Latest Ref Range: >39 mg/dL   34 (L) 29 (L)    VLDL, calculated Latest Ref Range: 5 - 40 mg/dL   38 59 (H)    LDL, calculated Latest Ref Range: 0 - 99 mg/dL   68 62    Hemoglobin A1c, (calculated) Latest Ref Range: 4.8 - 5.6 % 6.8 (H)  6.5 (H) 8.0 (H)    Hemoglobin A1c (POC) Latest Units: %     8.8   Estimated average glucose Latest Units: mg/dL    183    Creatinine, urine Latest Ref Range: Not Estab. mg/dL   254.2 182.0    Microalbumin, urine Latest Ref Range: Not Estab. ug/mL   14.4 10.4    Microalbumin/Creat. Ratio Latest Ref Range: 0 - 29 mg/g creat   6 6      ASSESSMENT and PLAN    ICD-10-CM ICD-9-CM    1. Type 2 diabetes mellitus with hyperglycemia, without long-term current use of insulin ([facility])  E11.65 250.00 AMB POC HEMOGLOBIN A1C     790.29 metFORMIN (GLUCOPHAGE) 1,000 mg tablet      SITagliptin (JANUVIA) 50 mg tablet   2. Hypercholesterolemia  E78.00 272.0    3. Coronary artery disease of native artery of native heart with stable angina pectoris ([facility])  I25.118 414.01      413.9    Assessment:  Diabetic control has worsened    Health maintenance recommendations:  COVID-19 booster  Influenza immunization declined    Plan:  Increase Metformin to 1000 mg twice daily with food, can use up leftover 500 mg pills by taking 2 twice a day with food  Begin sitagliptin or Januvia 50 mg 1 time daily  Continue to work on avoiding dietary starch and sugar and following a program of regular aerobic exercise  Continue current medications  Return for follow-up in 3 months with an office hemoglobin A1c    Maynor Menchaca MD      PLEASE NOTE:   This document has been produced using voice recognition software. Unrecognized errors in transcription may be present.

## 2021-11-11 NOTE — PATIENT INSTRUCTIONS
Increase Metformin to 1000 mg twice daily with food, can use up leftover 500 mg pills by taking 2 twice a day with food  Begin sitagliptin or Januvia 50 mg 1 time daily  Continue to work on avoiding dietary starch and sugar and following a program of regular aerobic exercise  Continue current medications  Return for follow-up in 3 months with an office hemoglobin A1c

## 2021-12-19 RX ORDER — SIMVASTATIN 10 MG/1
TABLET, FILM COATED ORAL
Qty: 90 TABLET | Refills: 0 | Status: SHIPPED | OUTPATIENT
Start: 2021-12-19 | End: 2022-03-20

## 2022-01-04 ENCOUNTER — HOSPITAL ENCOUNTER (OUTPATIENT)
Dept: LAB | Age: 72
Discharge: HOME | End: 2022-01-04
Payer: COMMERCIAL

## 2022-01-04 PROCEDURE — U0003 INFECTIOUS AGENT DETECTION BY NUCLEIC ACID (DNA OR RNA); SEVERE ACUTE RESPIRATORY SYNDROME CORONAVIRUS 2 (SARS-COV-2) (CORONAVIRUS DISEASE [COVID-19]), AMPLIFIED PROBE TECHNIQUE, MAKING USE OF HIGH THROUGHPUT TECHNOLOGIES AS DESCRIBED BY CMS-2020-01-R: HCPCS

## 2022-01-05 LAB — SARS-COV-2, NAA: NOT DETECTED

## 2022-02-11 ENCOUNTER — OFFICE VISIT (OUTPATIENT)
Dept: FAMILY MEDICINE CLINIC | Age: 72
End: 2022-02-11
Payer: COMMERCIAL

## 2022-02-11 VITALS
TEMPERATURE: 97.9 F | RESPIRATION RATE: 16 BRPM | HEIGHT: 65 IN | DIASTOLIC BLOOD PRESSURE: 80 MMHG | HEART RATE: 74 BPM | WEIGHT: 180 LBS | OXYGEN SATURATION: 97 % | SYSTOLIC BLOOD PRESSURE: 114 MMHG | BODY MASS INDEX: 29.99 KG/M2

## 2022-02-11 DIAGNOSIS — E78.00 HYPERCHOLESTEROLEMIA: ICD-10-CM

## 2022-02-11 DIAGNOSIS — Z86.010 HISTORY OF ADENOMATOUS POLYP OF COLON: ICD-10-CM

## 2022-02-11 DIAGNOSIS — I25.118 CORONARY ARTERY DISEASE OF NATIVE ARTERY OF NATIVE HEART WITH STABLE ANGINA PECTORIS (HCC): ICD-10-CM

## 2022-02-11 DIAGNOSIS — N52.1 ERECTILE DYSFUNCTION DUE TO DIABETES MELLITUS (HCC): ICD-10-CM

## 2022-02-11 DIAGNOSIS — E11.65 TYPE 2 DIABETES MELLITUS WITH HYPERGLYCEMIA, WITHOUT LONG-TERM CURRENT USE OF INSULIN (HCC): Primary | ICD-10-CM

## 2022-02-11 DIAGNOSIS — Z00.00 ROUTINE HEALTH MAINTENANCE: ICD-10-CM

## 2022-02-11 DIAGNOSIS — E55.9 VITAMIN D DEFICIENCY: ICD-10-CM

## 2022-02-11 DIAGNOSIS — E11.69 ERECTILE DYSFUNCTION DUE TO DIABETES MELLITUS (HCC): ICD-10-CM

## 2022-02-11 LAB — HBA1C MFR BLD HPLC: 6.2 %

## 2022-02-11 PROCEDURE — 83036 HEMOGLOBIN GLYCOSYLATED A1C: CPT | Performed by: FAMILY MEDICINE

## 2022-02-11 PROCEDURE — 99214 OFFICE O/P EST MOD 30 MIN: CPT | Performed by: FAMILY MEDICINE

## 2022-02-11 RX ORDER — SILDENAFIL 100 MG/1
TABLET, FILM COATED ORAL
Qty: 15 TABLET | Refills: 5 | Status: SHIPPED | OUTPATIENT
Start: 2022-02-11

## 2022-02-11 NOTE — PROGRESS NOTES
HISTORY OF PRESENT ILLNESS  Brodie Perrin is a 73 y.o. male. He presents for follow-up after evaluation 3 months ago revealing suboptimal control diabetes resulting in the following assessment and plan:    Assessment:  Diabetic control has worsened     Health maintenance recommendations:  COVID-19 booster  Influenza immunization declined     Plan:  Increase Metformin to 1000 mg twice daily with food, can use up leftover 500 mg pills by taking 2 twice a day with food  Begin sitagliptin or Januvia 50 mg 1 time daily  Continue to work on avoiding dietary starch and sugar and following a program of regular aerobic exercise  Continue current medications  Return for follow-up in 3 months with an office hemoglobin A1c      Mr#: [id number]      Past Medical History:   Diagnosis Date    CAD (coronary artery disease)     Constipation     Diabetes mellitus ([facility] 8/7/2015    Diarrhea     Gastric ulcer 12/16/2014    On EGD in 2014.  Hiatal hernia 12/16/2014    On EGD, 2014. With Dr. Pippa Mix.  History of Helicobacter pylori infection 12/16/2014    On EGD in 2014, Dr. Pippa Mix. S/p Tx.  Hyperlipidemia     Impotence of organic origin     Lactose intolerance     Low serum testosterone level     Nocturia     Other testicular hypofunction     Scrotal abscess 4/27/2018    Sinus congestion     Stented coronary artery 09/2013.     Umbilical hernia without obstruction and without gangrene 4/27/2018    Vitamin D deficiency        Past Surgical History:   Procedure Laterality Date    HX COLONOSCOPY  03/20/2017    Tubular adenoma, 5-year follow-up, Dr. Dru Patel  09/2013    HX GI  04/02/2018    groin abcess     HX HERNIA REPAIR  2003    inguinial, right       Family History   Problem Relation Age of Onset    Hypertension Mother     Cancer Sister     Diabetes Brother        Allergies   Allergen Reactions    Amaryl [Glimepiride] Vertigo     Patient reported that this medications made him very dizzy and light headed. It was discontinued by Dr Clayton Ramirez       Social History     Tobacco Use   Smoking Status Former Smoker    Packs/day: 1.00   Smokeless Tobacco Never Used       Social History     Substance and Sexual Activity   Alcohol Use Yes    Comment: occasionally           Patient Active Problem List   Diagnosis Code    Hyperlipidemia E78.5    Lactose intolerance E73.9    Sinus congestion R09.81    Coronary artery disease of native artery of native heart with stable angina pectoris ([facility]) I25.118    Vitamin D deficiency E55.9    Low serum testosterone level R79.89    Hiatal hernia K44.9    History of Helicobacter pylori infection Z86.19    Gastric ulcer K25.9    Impotence of organic origin N52.9    Diabetes mellitus ([facility] [address] E11.9    Scrotal abscess A20.9    Umbilical hernia without obstruction and without gangrene K42.9    Nocturia R35.1    Stented coronary artery Z95.5    Chest pain R07.9    Coccydynia M53.3    Heart murmur R01.1    Perineal abscess L02.215    Controlled type 2 diabetes mellitus without complication, without long-term current use of insulin ([facility]) E11.9    Hypercholesterolemia E78.00         Current Outpatient Medications:     simvastatin (ZOCOR) 10 mg tablet, take 1 tablet by mouth every evening, Disp: 90 Tablet, Rfl: 0    metFORMIN (GLUCOPHAGE) 1,000 mg tablet, Take 1 Tablet by mouth two (2) times daily (with meals). , Disp: 180 Tablet, Rfl: 1    SITagliptin (JANUVIA) 50 mg tablet, Take 1 Tablet by mouth daily. , Disp: 90 Tablet, Rfl: 1    OneTouch Ultra Test strip, use 1 TEST STRIP to TEST BLOOD SUGAR once daily, Disp: 100 Strip, Rfl: 2    omeprazole (PRILOSEC) 20 mg capsule, Take 20 mg by mouth daily. , Disp: , Rfl:     cholecalciferol, vitamin D3, 50 mcg (2,000 unit) tab, Take  by mouth daily. , Disp: , Rfl:     tadalafiL (CIALIS) 5 mg tablet, Take 1 Tab by mouth daily. For ED.   Indications: the inability to have an erection, Disp: 90 Tab, Rfl: 3   aspirin delayed-release 81 mg tablet, Take  by mouth daily. , Disp: , Rfl:     FIBER THERAPY LAXATIVE, HUSK, 0.52 gram capsule, Take 1 Cap by mouth daily. , Disp: , Rfl: 0    sildenafil citrate (VIAGRA) 100 mg tablet, Take 1 Tab by mouth daily as needed for up to 4 doses. , Disp: 6 Tab, Rfl: 12    Lancets (ONETOUCH ULTRASOFT LANCETS) [facility] [place], For once daily testing, Disp: 1 Each, Rfl: 5    nitroglycerin (NITROSTAT) 0.4 mg SL tablet, by SubLINGual route every five (5) minutes as needed for Chest Pain., Disp: , Rfl:          Review of Systems   Constitutional: Negative for chills, fever and weight loss. Eyes: Negative for blurred vision. Respiratory: Negative for shortness of breath. Cardiovascular: Negative for chest pain and palpitations. Genitourinary: Negative for frequency. Neurological: Negative for tingling. Endo/Heme/Allergies: Negative for polydipsia. Visit Vitals  /80   Pulse 74   Temp 97.9 °F (36.6 °C) (Temporal)   Resp 16   Ht 5' 5\" (1.651 m)   Wt 180 lb (81.6 kg)   SpO2 97%   BMI 29.95 kg/m²       Physical Exam  Vitals and nursing note reviewed. Constitutional:       General: He is not in acute distress. Appearance: Normal appearance. He is well-developed. He is obese. He is not ill-appearing. HENT:      Head: Normocephalic. Eyes:      Extraocular Movements: Extraocular movements intact. Cardiovascular:      Rate and Rhythm: Normal rate. Pulmonary:      Effort: Pulmonary effort is normal.   Neurological:      Mental Status: He is alert and oriented to person, place, and time. Psychiatric:         Mood and Affect: Mood normal.         Behavior: Behavior normal.         Thought Content: Thought content normal.       Results for [facility] (MRN [id number]) as of 2/11/2022 16:12   Ref.  Range 7/14/2020 09:11 8/3/2021 00:00 11/11/2021 14:03 2/11/2022 15:39   Triglyceride Latest Ref Range: 0 - 149 mg/dL 192 (H) 379 (H)     Cholesterol, total Latest Ref Range: 100 - 199 mg/dL 140 150     HDL Cholesterol Latest Ref Range: >39 mg/dL 34 (L) 29 (L)     VLDL, calculated Latest Ref Range: 5 - 40 mg/dL 38 59 (H)     LDL, calculated Latest Ref Range: 0 - 99 mg/dL 68 62     Hemoglobin A1c, (calculated) Latest Ref Range: 4.8 - 5.6 % 6.5 (H) 8.0 (H)     Hemoglobin A1c (POC) Latest Units: %   8.8 6.2     ASSESSMENT and PLAN    ICD-10-CM ICD-9-CM    1. Type 2 diabetes mellitus with hyperglycemia, without long-term current use of insulin (HCC)  E11.65 250.00 AMB POC HEMOGLOBIN A1C     790.29 HEMOGLOBIN A1C WITH EAG      MICROALBUMIN, UR, RAND W/ MICROALB/CREAT RATIO   2. Coronary artery disease of native artery of native heart with stable angina pectoris ([facility]  I25.118 414.01      413.9    3. Hypercholesterolemia  E78.00 272.0 TSH 3RD GENERATION      LIPID PANEL   4. Vitamin D deficiency  E55.9 268.9 VITAMIN D, 25 HYDROXY   5. History of adenomatous polyp of colon  Z86.010 V12.72 REFERRAL TO GASTROENTEROLOGY   6. Erectile dysfunction due to diabetes mellitus (HCC)  E11.69 250.80 sildenafil citrate (Viagra) 100 mg tablet    N52.1 607.84    7.  Routine health maintenance  Z00.00 V70.0 CBC WITH AUTOMATED DIFF      HEMOGLOBIN A1C WITH EAG      URINALYSIS W/ RFLX MICROSCOPIC      TSH 3RD GENERATION      MICROALBUMIN, UR, RAND W/ MICROALB/CREAT RATIO      METABOLIC PANEL, COMPREHENSIVE      LIPID PANEL      PSA SCREENING (SCREENING)      VITAMIN D, 25 HYDROXY   Assessment:  Type 2 diabetes now well controlled  Coronary artery disease without current symptoms of cardiac ischemia, followed by cardiology  Lipid level satisfactory at annual check in August 2021  Vitamin D level within normal range when checked in August  Persistent problem with erectile dysfunction  Due soon for 5-year interval colorectal cancer screening    Health maintenance recommendations:  COVID-19 immunization-completed  Shingrix immunization-declined    Plan:  Sildenafil (Viagra) 100 mg Take 1 tablet 1 hour before intercourse, do not exceed 1 dose in 24 hours  Cautioned that taking Viagra after taking nitroglycerin can result in life-threatening hypotension  Referral for colonoscopy which is due in March  Continue current medicationsContinue to avoid dietary salt, starch and sugar and follow program of regular aerobic exercise  Continue cardiology follow-up as recommended by the cardiology consultant  Return for lab appointment followed by annual physical exam appointment after 8/9/2022 or sooner with any problems    Nura Smiht MD      PLEASE NOTE:   This document has been produced using voice recognition software. Unrecognized errors in transcription may be present.

## 2022-02-11 NOTE — PATIENT INSTRUCTIONS
Assessment:  Type 2 diabetes now well controlled  Coronary artery disease without current symptoms of cardiac ischemia, followed by cardiology  Lipid level satisfactory at annual check in August 2021  Vitamin D level within normal range when checked in August    Health maintenance recommendations:  COVID-19 immunization-completed  Shingrix immunization-declined    Plan:  Sildenafil (Viagra) 100 mg Take 1 tablet 1 hour before intercourse, do not exceed 1 dose in 24 hours  Cautioned that taking Viagra after taking nitroglycerin can result in life-threatening hypotension  Referral for colonoscopy which is due in March  Continue current medicationsContinue to avoid dietary salt, starch and sugar and follow program of regular aerobic exercise  Continue cardiology follow-up as recommended by the cardiology consultant  Return for lab appointment followed by annual physical exam appointment after 8/9/2022 or sooner with any problems

## 2022-02-11 NOTE — PROGRESS NOTES
Carey De La Torre is a 73 y.o. male (: 1950) presenting to address:    Chief Complaint   Patient presents with    Diabetes       Vitals:    22 1535   BP: 114/80   Pulse: 74   Resp: 16   Temp: 97.9 °F (36.6 °C)   TempSrc: Temporal   SpO2: 97%   Weight: 180 lb (81.6 kg)   Height: 5' 5\" (1.651 m)   PainSc:   0 - No pain       Hearing/Vision:   No exam data present    Learning Assessment:     Learning Assessment 5/10/2021   PRIMARY LEARNER Patient   HIGHEST LEVEL OF EDUCATION - PRIMARY LEARNER  GRADUATED HIGH SCHOOL OR GED   BARRIERS PRIMARY LEARNER NONE   CO-LEARNER CAREGIVER No   PRIMARY LANGUAGE ENGLISH    NEED No   LEARNER PREFERENCE PRIMARY PICTURES   ANSWERED BY patient   RELATIONSHIP SELF     Depression Screening:     3 most recent PHQ Screens 2022   PHQ Not Done -   Little interest or pleasure in doing things Not at all   Feeling down, depressed, irritable, or hopeless Not at all   Total Score PHQ 2 0     Fall Risk Assessment:     Fall Risk Assessment, last 12 mths 2021   Able to walk? Yes   Fall in past 12 months? 0   Do you feel unsteady? 0   Are you worried about falling 0     Abuse Screening:     Abuse Screening Questionnaire 2021   Do you ever feel afraid of your partner? N   Are you in a relationship with someone who physically or mentally threatens you? N   Is it safe for you to go home?  Y     ADL Assessment:     ADL Assessment 2018   Feeding yourself No Help Needed   Getting from bed to chair No Help Needed   Getting dressed No Help Needed   Bathing or showering No Help Needed   Walk across the room (includes cane/walker) No Help Needed   Using the telphone No Help Needed   Taking your medications No Help Needed   Preparing meals No Help Needed   Managing money (expenses/bills) No Help Needed   Moderately strenuous housework (laundry) No Help Needed   Shopping for personal items (toiletries/medicines) No Help Needed   Shopping for groceries No Help Needed Driving No Help Needed   Climbing a flight of stairs No Help Needed   Getting to places beyond walking distances No Help Needed        Coordination of Care Questionaire:   1. \"Have you been to the ER, urgent care clinic since your last visit? Hospitalized since your last visit? \" No    2. \"Have you seen or consulted any other health care providers outside of the [address] [place] [address] [place] since your last visit? \" No     3. For patients aged 39-70: Has the patient had a colonoscopy? Yes - no Care Gap present         Advanced Directive:   1. Do you have an Advanced Directive? NO    2. Would you like information on Advanced Directives?  NO

## 2022-03-18 PROBLEM — I25.118 CORONARY ARTERY DISEASE OF NATIVE ARTERY OF NATIVE HEART WITH STABLE ANGINA PECTORIS (HCC): Status: ACTIVE | Noted: 2020-03-30

## 2022-03-18 PROBLEM — N49.2 SCROTAL ABSCESS: Status: ACTIVE | Noted: 2018-04-02

## 2022-03-19 PROBLEM — R01.1 HEART MURMUR: Status: ACTIVE | Noted: 2020-10-19

## 2022-03-19 PROBLEM — E11.9 CONTROLLED TYPE 2 DIABETES MELLITUS WITHOUT COMPLICATION, WITHOUT LONG-TERM CURRENT USE OF INSULIN (HCC): Status: ACTIVE | Noted: 2020-03-30

## 2022-03-19 PROBLEM — K42.9 UMBILICAL HERNIA WITHOUT OBSTRUCTION AND WITHOUT GANGRENE: Status: ACTIVE | Noted: 2018-04-27

## 2022-03-20 PROBLEM — L02.215 PERINEAL ABSCESS: Status: ACTIVE | Noted: 2018-04-02

## 2022-03-20 RX ORDER — SIMVASTATIN 10 MG/1
TABLET, FILM COATED ORAL
Qty: 90 TABLET | Refills: 1 | Status: SHIPPED | OUTPATIENT
Start: 2022-03-20 | End: 2022-10-23

## 2022-05-06 DIAGNOSIS — E11.65 TYPE 2 DIABETES MELLITUS WITH HYPERGLYCEMIA, WITHOUT LONG-TERM CURRENT USE OF INSULIN (HCC): ICD-10-CM

## 2022-05-06 RX ORDER — SITAGLIPTIN 50 MG/1
TABLET, FILM COATED ORAL
Qty: 90 TABLET | Refills: 1 | Status: SHIPPED | OUTPATIENT
Start: 2022-05-06

## 2022-05-08 DIAGNOSIS — E11.65 TYPE 2 DIABETES MELLITUS WITH HYPERGLYCEMIA, WITHOUT LONG-TERM CURRENT USE OF INSULIN (HCC): ICD-10-CM

## 2022-05-08 RX ORDER — METFORMIN HYDROCHLORIDE 1000 MG/1
TABLET ORAL
Qty: 180 TABLET | Refills: 2 | Status: SHIPPED | OUTPATIENT
Start: 2022-05-08

## 2022-06-08 LAB — SARS-COV-2, NAA: NOT DETECTED

## 2022-07-28 PROBLEM — Z86.0101 HISTORY OF ADENOMATOUS POLYP OF COLON: Status: ACTIVE | Noted: 2022-07-28

## 2022-07-28 PROBLEM — Z86.010 HISTORY OF ADENOMATOUS POLYP OF COLON: Status: ACTIVE | Noted: 2022-07-28

## 2022-08-10 ENCOUNTER — HOSPITAL ENCOUNTER (OUTPATIENT)
Dept: LAB | Age: 72
Discharge: HOME | End: 2022-08-10
Payer: COMMERCIAL

## 2022-08-10 ENCOUNTER — APPOINTMENT (OUTPATIENT)
Dept: FAMILY MEDICINE CLINIC | Age: 72
End: 2022-08-10

## 2022-08-10 DIAGNOSIS — Z00.00 ROUTINE HEALTH MAINTENANCE: ICD-10-CM

## 2022-08-10 DIAGNOSIS — E78.00 HYPERCHOLESTEROLEMIA: ICD-10-CM

## 2022-08-10 DIAGNOSIS — E11.65 TYPE 2 DIABETES MELLITUS WITH HYPERGLYCEMIA, WITHOUT LONG-TERM CURRENT USE OF INSULIN (HCC): ICD-10-CM

## 2022-08-10 DIAGNOSIS — E55.9 VITAMIN D DEFICIENCY: ICD-10-CM

## 2022-08-10 LAB
25(OH)D3 SERPL-MCNC: 42.7 NG/ML (ref 30–100)
ALBUMIN SERPL-MCNC: 4 G/DL (ref 3.4–5)
ALBUMIN/GLOB SERPL: 1.1 {RATIO} (ref 0.8–1.7)
ALP SERPL-CCNC: 69 U/L (ref 45–117)
ALT SERPL-CCNC: 31 U/L (ref 16–61)
ANION GAP SERPL CALC-SCNC: 7 MMOL/L (ref 3–18)
APPEARANCE UR: CLEAR
AST SERPL-CCNC: 19 U/L (ref 10–38)
BACTERIA URNS QL MICRO: ABNORMAL /HPF
BASOPHILS # BLD: 0 K/UL (ref 0–0.1)
BASOPHILS NFR BLD: 1 % (ref 0–2)
BILIRUB SERPL-MCNC: 0.5 MG/DL (ref 0.2–1)
BILIRUB UR QL: NEGATIVE
BUN SERPL-MCNC: 19 MG/DL (ref 7–18)
BUN/CREAT SERPL: 17 (ref 12–20)
CALCIUM SERPL-MCNC: 9.5 MG/DL (ref 8.5–10.1)
CHLORIDE SERPL-SCNC: 107 MMOL/L (ref 100–111)
CHOLEST SERPL-MCNC: 130 MG/DL
CO2 SERPL-SCNC: 26 MMOL/L (ref 21–32)
COLOR UR: YELLOW
CREAT SERPL-MCNC: 1.1 MG/DL (ref 0.6–1.3)
CREAT UR-MCNC: 244 MG/DL (ref 30–125)
DIFFERENTIAL METHOD BLD: ABNORMAL
EOSINOPHIL # BLD: 0.1 K/UL (ref 0–0.4)
EOSINOPHIL NFR BLD: 2 % (ref 0–5)
ERYTHROCYTE [DISTWIDTH] IN BLOOD BY AUTOMATED COUNT: 13.1 % (ref 11.6–14.5)
EST. AVERAGE GLUCOSE BLD GHB EST-MCNC: 146 MG/DL
GLOBULIN SER CALC-MCNC: 3.5 G/DL (ref 2–4)
GLUCOSE SERPL-MCNC: 134 MG/DL (ref 74–99)
GLUCOSE UR STRIP.AUTO-MCNC: NEGATIVE MG/DL
HBA1C MFR BLD: 6.7 % (ref 4.2–5.6)
HCT VFR BLD AUTO: 42.8 % (ref 36–48)
HDLC SERPL-MCNC: 34 MG/DL (ref 40–60)
HDLC SERPL: 3.8 {RATIO} (ref 0–5)
HGB BLD-MCNC: 13.7 G/DL (ref 13–16)
HGB UR QL STRIP: NEGATIVE
IMM GRANULOCYTES # BLD AUTO: 0 K/UL (ref 0–0.04)
IMM GRANULOCYTES NFR BLD AUTO: 0 % (ref 0–0.5)
KETONES UR QL STRIP.AUTO: ABNORMAL MG/DL
LDLC SERPL CALC-MCNC: 48.8 MG/DL (ref 0–100)
LEUKOCYTE ESTERASE UR QL STRIP.AUTO: NEGATIVE
LIPID PROFILE,FLP: ABNORMAL
LYMPHOCYTES # BLD: 2.4 K/UL (ref 0.9–3.6)
LYMPHOCYTES NFR BLD: 39 % (ref 21–52)
MCH RBC QN AUTO: 30.6 PG (ref 24–34)
MCHC RBC AUTO-ENTMCNC: 32 G/DL (ref 31–37)
MCV RBC AUTO: 95.7 FL (ref 78–100)
MICROALBUMIN UR-MCNC: 3.96 MG/DL (ref 0–3)
MICROALBUMIN/CREAT UR-RTO: 16 MG/G (ref 0–30)
MONOCYTES # BLD: 0.6 K/UL (ref 0.05–1.2)
MONOCYTES NFR BLD: 10 % (ref 3–10)
MUCOUS THREADS URNS QL MICRO: ABNORMAL /LPF
NEUTS SEG # BLD: 3 K/UL (ref 1.8–8)
NEUTS SEG NFR BLD: 49 % (ref 40–73)
NITRITE UR QL STRIP.AUTO: NEGATIVE
NRBC # BLD: 0 K/UL (ref 0–0.01)
NRBC BLD-RTO: 0 PER 100 WBC
PH UR STRIP: 5.5 [PH] (ref 5–8)
PLATELET # BLD AUTO: 114 K/UL (ref 135–420)
PMV BLD AUTO: 12.3 FL (ref 9.2–11.8)
POTASSIUM SERPL-SCNC: 4.2 MMOL/L (ref 3.5–5.5)
PROT SERPL-MCNC: 7.5 G/DL (ref 6.4–8.2)
PROT UR STRIP-MCNC: ABNORMAL MG/DL
PSA SERPL-MCNC: 0.6 NG/ML (ref 0–4)
RBC # BLD AUTO: 4.47 M/UL (ref 4.35–5.65)
SODIUM SERPL-SCNC: 140 MMOL/L (ref 136–145)
SP GR UR REFRACTOMETRY: 1.03 (ref 1–1.03)
TRIGL SERPL-MCNC: 236 MG/DL (ref ?–150)
TSH SERPL DL<=0.05 MIU/L-ACNC: 1.02 UIU/ML (ref 0.36–3.74)
UROBILINOGEN UR QL STRIP.AUTO: 1 EU/DL (ref 0.2–1)
VLDLC SERPL CALC-MCNC: 47.2 MG/DL
WBC # BLD AUTO: 6.1 K/UL (ref 4.6–13.2)
WBC URNS QL MICRO: ABNORMAL /HPF (ref 0–4)

## 2022-08-10 PROCEDURE — 84443 ASSAY THYROID STIM HORMONE: CPT

## 2022-08-10 PROCEDURE — 80053 COMPREHEN METABOLIC PANEL: CPT

## 2022-08-10 PROCEDURE — 83036 HEMOGLOBIN GLYCOSYLATED A1C: CPT

## 2022-08-10 PROCEDURE — 81001 URINALYSIS AUTO W/SCOPE: CPT

## 2022-08-10 PROCEDURE — 36415 COLL VENOUS BLD VENIPUNCTURE: CPT

## 2022-08-10 PROCEDURE — 80061 LIPID PANEL: CPT

## 2022-08-10 PROCEDURE — 85025 COMPLETE CBC W/AUTO DIFF WBC: CPT

## 2022-08-10 PROCEDURE — 84153 ASSAY OF PSA TOTAL: CPT

## 2022-08-10 PROCEDURE — 82043 UR ALBUMIN QUANTITATIVE: CPT

## 2022-08-10 PROCEDURE — 82306 VITAMIN D 25 HYDROXY: CPT

## 2022-08-14 NOTE — PROGRESS NOTES
HISTORY OF PRESENT ILLNESS  Jerica Ziegler is a 73 y.o. male. He presents for health assessment, preventative care and follow-up with a history of type 2 diabetes, hyperlipidemia, coronary artery disease followed by cardiology, vitamin D deficiency and tubular adenomas of the colon. He reports that he banged his left third toe against the bathtub 2 weeks ago and it is improving but is still sore and swollen. He also notes that he has been experiencing daytime drowsiness and that his wife reports that he snores and he is concerned about obstructive sleep apnea. Mr#: [id number]      Past Medical History:   Diagnosis Date    CAD (coronary artery disease)     Constipation     Diabetes mellitus ([facility] [address] 8/7/2015    Diarrhea     Gastric ulcer 12/16/2014    On EGD in 2014. Hiatal hernia 12/16/2014    On EGD, 2014. With Dr. Jessie Zarate. History of Helicobacter pylori infection 12/16/2014    On EGD in 2014, Dr. Jessie Zarate. S/p Tx. Hyperlipidemia     Impotence of organic origin     Lactose intolerance     Low serum testosterone level     Nocturia     Other testicular hypofunction     Scrotal abscess 4/27/2018    Sinus congestion     Stented coronary artery 09/2013. Umbilical hernia without obstruction and without gangrene 4/27/2018    Vitamin D deficiency        Past Surgical History:   Procedure Laterality Date    HX COLONOSCOPY  03/20/2017    Tubular adenoma, 5-year follow-up, Dr. Jessie Zarate    HX COLONOSCOPY  07/13/2022    7 polyps excised, pathology pending, Dr.Makdisi ACHARYA CORONARY STENT PLACEMENT  09/2013    HX GI  04/02/2018    groin abcess     HX HERNIA REPAIR  2003    inguinial, right       Family History   Problem Relation Age of Onset    Hypertension Mother     Cancer Sister     Diabetes Brother        Allergies   Allergen Reactions    Amaryl [Glimepiride] Vertigo     Patient reported that this medications made him very dizzy and light headed.  It was discontinued by Dr Carlito Schaeffer Tobacco Use   Smoking Status Former    Packs/day: 1.00    Types: Cigarettes   Smokeless Tobacco Never       Social History     Substance and Sexual Activity   Alcohol Use Yes    Comment: occasionally           Patient Active Problem List   Diagnosis Code    Hyperlipidemia E78.5    Lactose intolerance E73.9    Sinus congestion R09.81    Coronary artery disease of native artery of native heart with stable angina pectoris (HCC) I25.118    Vitamin D deficiency E55.9    Low serum testosterone level R79.89    Hiatal hernia K44.9    History of Helicobacter pylori infection Z86.19    Gastric ulcer K25.9    Impotence of organic origin N52.9    Diabetes mellitus ([address] E11.9    Scrotal abscess T00.1    Umbilical hernia without obstruction and without gangrene K42.9    Nocturia R35.1    Stented coronary artery Z95.5    Chest pain R07.9    Coccydynia M53.3    Heart murmur R01.1    Perineal abscess L02.215    Controlled type 2 diabetes mellitus without complication, without long-term current use of insulin ([facility]) E11.9    Hypercholesterolemia E78.00    History of adenomatous polyp of colon Z86.010         Current Outpatient Medications:     metFORMIN (GLUCOPHAGE) 1,000 mg tablet, take 1 tablet by mouth twice a day with meals, Disp: 180 Tablet, Rfl: 2    Januvia 50 mg tablet, take 1 tablet by mouth once daily, Disp: 90 Tablet, Rfl: 1    simvastatin (ZOCOR) 10 mg tablet, take 1 tablet by mouth every evening, Disp: 90 Tablet, Rfl: 1    sildenafil citrate (Viagra) 100 mg tablet, Take 1 tablet 1 hour before intercourse, do not exceed 1 dose in 24 hours, Disp: 15 Tablet, Rfl: 5    OneTouch Ultra Test strip, use 1 TEST STRIP to TEST BLOOD SUGAR once daily, Disp: 100 Strip, Rfl: 2    omeprazole (PRILOSEC) 20 mg capsule, Take 20 mg by mouth daily. , Disp: , Rfl:     cholecalciferol, vitamin D3, 50 mcg (2,000 unit) tab, Take  by mouth daily. , Disp: , Rfl:     aspirin delayed-release 81 mg tablet, Take  by mouth daily. , Disp: , Rfl: FIBER THERAPY LAXATIVE, HUSK, 0.52 gram capsule, Take 1 Cap by mouth daily. , Disp: , Rfl: 0    Lancets (ONETOUCH ULTRASOFT LANCETS) [facility] For once daily testing, Disp: 1 Each, Rfl: 5         Review of Systems   Constitutional:  Negative for chills, fever and weight loss. HENT:  Negative for congestion, ear pain, hearing loss and sore throat. Right ear feels stopped up   Eyes:  Negative for blurred vision and double vision. Respiratory:  Negative for cough, shortness of breath and wheezing. Cardiovascular:  Negative for chest pain, palpitations and leg swelling. Gastrointestinal:  Negative for abdominal pain, blood in stool, constipation, diarrhea, heartburn, melena, nausea and vomiting. Genitourinary:  Negative for dysuria and urgency. He reports noticing some right inguinal discomfort and is concerned that a previously repaired inguinal hernia may be recurring   Musculoskeletal:  Negative for joint pain and myalgias. Sore, swollen left third toe   Skin:  Negative for itching and rash. Neurological:  Negative for dizziness, tingling, sensory change, focal weakness and headaches. Endo/Heme/Allergies:  Positive for environmental allergies. Psychiatric/Behavioral:  Negative for depression. The patient is not nervous/anxious and does not have insomnia. Visit Vitals  /80   Pulse 89   Temp 98.2 °F (36.8 °C) (Temporal)   Resp 16   Ht 5' 5\" (1.651 m)   Wt 182 lb (82.6 kg)   SpO2 94%   BMI 30.29 kg/m²       Physical Exam  Vitals and nursing note reviewed. Constitutional:       General: He is not in acute distress. Appearance: Normal appearance. He is not ill-appearing. HENT:      Head: Normocephalic.       Right Ear: Tympanic membrane, ear canal and external ear normal.      Left Ear: Tympanic membrane, ear canal and external ear normal.      Ears:      Comments: Patient unable to ventilate the left eustachian tube  Eyes:      Extraocular Movements: Extraocular movements intact. Conjunctiva/sclera: Conjunctivae normal.      Pupils: Pupils are equal, round, and reactive to light. Neck:      Vascular: No carotid bruit. Cardiovascular:      Rate and Rhythm: Normal rate and regular rhythm. Heart sounds: Normal heart sounds. Pulmonary:      Effort: Pulmonary effort is normal.      Breath sounds: Normal breath sounds. Abdominal:      Palpations: Abdomen is soft. Tenderness: There is no abdominal tenderness. Hernia: No hernia (No inguinal hernias palpated) is present. Musculoskeletal:         General: No deformity. Cervical back: Neck supple. Right lower leg: No edema. Left lower leg: No edema. Skin:     General: Skin is warm and dry. Neurological:      General: No focal deficit present. Mental Status: He is alert and oriented to person, place, and time. Psychiatric:         Mood and Affect: Mood normal.         Behavior: Behavior normal.           Diabetic foot exam performed by Trisha Hale MD     Measurement  Response Nurse Comment Physician Comment   Monofilament  R - normal sensation with micro filament  L - normal sensation with micro filament     Pulse DP R - 2+ (normal)  L - 2+ (normal)     Pulse TP R - 2+ (normal)  L - 2+ (normal)     Structural deformity R - None  L - Mild -edema left third toe     Skin Integrity / Deformity R - Mild -trophic toenails followed by podiatry  L - Mild -atrophic toenails followed by podiatry                                      ASSESSMENT and PLAN    ICD-10-CM ICD-9-CM    1. Routine general medical examination at a health care facility  Z00.00 V70.0       2. Type 2 diabetes mellitus with hyperglycemia, without long-term current use of insulin ([facility])  E11.65 250.00 PNEUMOCOCCAL, PCV20, PREVNAR 20, (AGE 18 YRS+), IM, PF     790.29  DIABETES FOOT EXAM      3. Coronary artery disease involving native coronary artery of native heart without angina pectoris  I25.10 414.01       4. Hypercholesterolemia  E78.00 272.0       5. Vitamin D deficiency  E55.9 268.9       6. History of adenomatous polyp of colon  Z86.010 V12.72       7. Has daytime drowsiness  R40.0 780.09 SLEEP MEDICINE REFERRAL      8. Acute dysfunction of left eustachian tube  H69.82 381.81       Assessment:  Type 2 diabetes well controlled  No symptoms of cardiac ischemia  Satisfactory lipid and vitamin D levels  Colonoscopy surveillance up-to-date  Concern regarding obstructive sleep apnea  Contusion left third toe  Left eustachian tube dysfunction    Health maintenance recommendations:  COVID-19 immunization #4  PCV-20 immunization today    Plan:  Referred for sleep medicine evaluation  Recommend soaking the involved toe in warm water  Continue with frequent pressure equalization exercises as demonstrated to ventilate the left eustachian tube  Continue current medications  Continue to avoid dietary starch and sugar and follow program of regular aerobic exercise  Return for follow-up with an in office hemoglobin A1c in 6 months or sooner with any problems  Please always arrive 15 minutes before your scheduled appointment time  Isaak Elder MD      PLEASE NOTE:   This document has been produced using voice recognition software. Unrecognized errors in transcription may be present.

## 2022-08-15 ENCOUNTER — OFFICE VISIT (OUTPATIENT)
Dept: FAMILY MEDICINE CLINIC | Age: 72
End: 2022-08-15
Payer: COMMERCIAL

## 2022-08-15 VITALS
BODY MASS INDEX: 30.32 KG/M2 | OXYGEN SATURATION: 94 % | DIASTOLIC BLOOD PRESSURE: 80 MMHG | SYSTOLIC BLOOD PRESSURE: 136 MMHG | TEMPERATURE: 98.2 F | HEIGHT: 65 IN | HEART RATE: 89 BPM | WEIGHT: 182 LBS | RESPIRATION RATE: 16 BRPM

## 2022-08-15 DIAGNOSIS — H69.82 ACUTE DYSFUNCTION OF LEFT EUSTACHIAN TUBE: ICD-10-CM

## 2022-08-15 DIAGNOSIS — Z86.010 HISTORY OF ADENOMATOUS POLYP OF COLON: ICD-10-CM

## 2022-08-15 DIAGNOSIS — E78.00 HYPERCHOLESTEROLEMIA: ICD-10-CM

## 2022-08-15 DIAGNOSIS — E55.9 VITAMIN D DEFICIENCY: ICD-10-CM

## 2022-08-15 DIAGNOSIS — R40.0 HAS DAYTIME DROWSINESS: ICD-10-CM

## 2022-08-15 DIAGNOSIS — E11.65 TYPE 2 DIABETES MELLITUS WITH HYPERGLYCEMIA, WITHOUT LONG-TERM CURRENT USE OF INSULIN (HCC): ICD-10-CM

## 2022-08-15 DIAGNOSIS — Z00.00 ROUTINE GENERAL MEDICAL EXAMINATION AT A HEALTH CARE FACILITY: Primary | ICD-10-CM

## 2022-08-15 DIAGNOSIS — I25.10 CORONARY ARTERY DISEASE INVOLVING NATIVE CORONARY ARTERY OF NATIVE HEART WITHOUT ANGINA PECTORIS: ICD-10-CM

## 2022-08-15 PROCEDURE — 90471 IMMUNIZATION ADMIN: CPT | Performed by: FAMILY MEDICINE

## 2022-08-15 PROCEDURE — G0009 ADMIN PNEUMOCOCCAL VACCINE: HCPCS | Performed by: FAMILY MEDICINE

## 2022-08-15 PROCEDURE — 99397 PER PM REEVAL EST PAT 65+ YR: CPT | Performed by: FAMILY MEDICINE

## 2022-08-15 PROCEDURE — 90677 PCV20 VACCINE IM: CPT | Performed by: FAMILY MEDICINE

## 2022-08-15 NOTE — PATIENT INSTRUCTIONS
Assessment:  Type 2 diabetes well controlled  No symptoms of cardiac ischemia  Satisfactory lipid and vitamin D levels  Colonoscopy surveillance up-to-date  Concern regarding obstructive sleep apnea    Health maintenance recommendations:  COVID-19 immunization #4  PCV-20 immunization today    Plan:  Referred for sleep medicine evaluation  Continue current medications  Continue to avoid dietary starch and sugar and follow program of regular aerobic exercise  Return for follow-up with an in office hemoglobin A1c in 6 months or sooner with any problems  Please always arrive 15 minutes before your scheduled appointment time

## 2022-08-15 NOTE — PROGRESS NOTES
Ernestine Boyle is a 73 y.o. male (: 1950) presenting to address:    Chief Complaint   Patient presents with    Complete Physical    Sleep Apnea       Vitals:    08/15/22 1518   BP: 136/80   Pulse: 89   Resp: 16   Temp: 98.2 °F (36.8 °C)   TempSrc: Temporal   SpO2: 94%   Weight: 182 lb (82.6 kg)   Height: 5' 5\" (1.651 m)       Hearing/Vision:   No results found. Learning Assessment:     Learning Assessment 5/10/2021   PRIMARY LEARNER Patient   HIGHEST LEVEL OF EDUCATION - PRIMARY LEARNER  GRADUATED HIGH SCHOOL OR GED   BARRIERS PRIMARY LEARNER NONE   CO-LEARNER CAREGIVER No   PRIMARY LANGUAGE ENGLISH    NEED No   LEARNER PREFERENCE PRIMARY PICTURES   ANSWERED BY patient   RELATIONSHIP SELF     Depression Screening:     3 most recent PHQ Screens 8/15/2022   PHQ Not Done -   Little interest or pleasure in doing things Not at all   Feeling down, depressed, irritable, or hopeless Several days   Total Score PHQ 2 1     Fall Risk Assessment:     Fall Risk Assessment, last 12 mths 2021   Able to walk? Yes   Fall in past 12 months? 0   Do you feel unsteady? 0   Are you worried about falling 0     Abuse Screening:     Abuse Screening Questionnaire 2021   Do you ever feel afraid of your partner? N   Are you in a relationship with someone who physically or mentally threatens you? N   Is it safe for you to go home?  Y     ADL Assessment:     ADL Assessment 2018   Feeding yourself No Help Needed   Getting from bed to chair No Help Needed   Getting dressed No Help Needed   Bathing or showering No Help Needed   Walk across the room (includes cane/walker) No Help Needed   Using the telphone No Help Needed   Taking your medications No Help Needed   Preparing meals No Help Needed   Managing money (expenses/bills) No Help Needed   Moderately strenuous housework (laundry) No Help Needed   Shopping for personal items (toiletries/medicines) No Help Needed   Shopping for groceries No Help Needed Driving No Help Needed   Climbing a flight of stairs No Help Needed   Getting to places beyond walking distances No Help Needed        Coordination of Care Questionaire:   1. \"Have you been to the ER, urgent care clinic since your last visit? Hospitalized since your last visit? \" No    2. \"Have you seen or consulted any other health care providers outside of the [address] [place] [address] [place] since your last visit? \" No     3. For patients aged 39-70: Has the patient had a colonoscopy? Yes - no Care Gap present     I  Advanced Directive:   1. Do you have an Advanced Directive? NO    2. Would you like information on Advanced Directives? NO  Prevnar 20 Immunization/s administered 8/15/2022 by Jody Chaves LPN with guardian's consent. Patient tolerated procedure well. No reactions noted.

## 2022-10-04 ENCOUNTER — VIRTUAL VISIT (OUTPATIENT)
Dept: FAMILY MEDICINE CLINIC | Age: 72
End: 2022-10-04
Payer: COMMERCIAL

## 2022-10-04 DIAGNOSIS — J30.1 SEASONAL ALLERGIC RHINITIS DUE TO POLLEN: Primary | ICD-10-CM

## 2022-10-04 PROCEDURE — 1123F ACP DISCUSS/DSCN MKR DOCD: CPT | Performed by: FAMILY MEDICINE

## 2022-10-04 PROCEDURE — 99213 OFFICE O/P EST LOW 20 MIN: CPT | Performed by: FAMILY MEDICINE

## 2022-10-04 NOTE — PROGRESS NOTES
Fatou Preston is a 81 y.o. male who was seen by synchronous (real-time) audio-video technology using doxy. me on 10/4/2022. Mr#: [id number]    Consent:  He and/or his healthcare decision maker is aware that this patient-initiated Telehealth encounter is a billable service, with coverage as determined by his insurance carrier. He is aware that he may receive a bill and has provided verbal consent to proceed: YES    I was in the office while conducting this encounter. 712  HPI/Subjective:     He reports the onset about a week ago of runny nose, nasal congestion, scratchy throat, some headache in the back of his head but denies fever, cough, sinus pain or pressure, ear pain, muscle aches reporting \"a little diarrhea 1 day\"          Patient Active Problem List   Diagnosis Code    Hyperlipidemia E78.5    Lactose intolerance E73.9    Sinus congestion R09.81    Coronary artery disease of native artery of native heart with stable angina pectoris ([facility]) I25.118    Vitamin D deficiency E55.9    Low serum testosterone level R79.89    Hiatal hernia K44.9    History of Helicobacter pylori infection Z86.19    Gastric ulcer K25.9    Impotence of organic origin N52.9    Diabetes mellitus ([facility] E11.9    Scrotal abscess O37.9    Umbilical hernia without obstruction and without gangrene K42.9    Nocturia R35.1    Stented coronary artery Z95.5    Chest pain R07.9    Coccydynia M53.3    Heart murmur R01.1    Perineal abscess L02.215    Controlled type 2 diabetes mellitus without complication, without long-term current use of insulin ([facility]) E11.9    Hypercholesterolemia E78.00    History of adenomatous polyp of colon Z86.010            Allergies   Allergen Reactions    Amaryl [Glimepiride] Vertigo     Patient reported that this medications made him very dizzy and light headed.  It was discontinued by Dr Croft An         Current Outpatient Medications:     metFORMIN (GLUCOPHAGE) 1,000 mg tablet, take 1 tablet by mouth twice a day with meals, Disp: 180 Tablet, Rfl: 2    Januvia 50 mg tablet, take 1 tablet by mouth once daily, Disp: 90 Tablet, Rfl: 1    simvastatin (ZOCOR) 10 mg tablet, take 1 tablet by mouth every evening, Disp: 90 Tablet, Rfl: 1    sildenafil citrate (Viagra) 100 mg tablet, Take 1 tablet 1 hour before intercourse, do not exceed 1 dose in 24 hours, Disp: 15 Tablet, Rfl: 5    OneTouch Ultra Test strip, use 1 TEST STRIP to TEST BLOOD SUGAR once daily, Disp: 100 Strip, Rfl: 2    omeprazole (PRILOSEC) 20 mg capsule, Take 20 mg by mouth daily. , Disp: , Rfl:     cholecalciferol, vitamin D3, 50 mcg (2,000 unit) tab, Take  by mouth daily. , Disp: , Rfl:     aspirin delayed-release 81 mg tablet, Take  by mouth daily. , Disp: , Rfl:     Lancets (ONETOUCH ULTRASOFT LANCETS) [facility], For once daily testing, Disp: 1 Each, Rfl: 5      Review of Systems   Constitutional:  Negative for chills and fever. HENT:  Positive for congestion (Nasal congestion and runny nose) and sore throat (\"Scratchy throat\"). Negative for ear pain and sinus pain. Respiratory:  Negative for cough. Gastrointestinal:  Positive for diarrhea (\"a little one day\"). Musculoskeletal:  Negative for myalgias. Neurological:  Positive for headaches (Some headache in the back of the head). Negative for dizziness. Endo/Heme/Allergies:  Positive for environmental allergies (Acknowledges spring and fall seasonal allergy symptoms).        PHYSICAL EXAMINATION:    Constitutional: [x] Appears well-developed and well-nourished [x] No apparent distress        Mental status: [x] Alert and awake  [x] Oriented t [x] Able to follow commands      Eyes:   EOM    [x]  Normal        HENT: [x] Normocephalic,      Pulmonary/Chest: [x] Respiratory effort normal   [x] No visualized signs of difficulty breathing or respiratory distress           Musculoskeletal:   [x] No obvious deformities noted with regard to areas viewed           Neurological:        [x] No apparent neurologic deficits noted in areas viewed                 Skin:        [x] No apparent dermatologic abnormalities noted in areas viewed               Psychiatric:       [x] Normal Affect      Other pertinent observable physical exam findings:-None noted          Assessment & Plan:   Diagnoses and all orders for this visit:    1. Seasonal allergic rhinitis due to pollen  Symptoms consistent with seasonal allergic symptoms versus viral URI, previously tested negative for COVID-19, symptoms not suggestive of sinusitis  Recommend supportive care, continue OTC antihistamines, Tylenol, fluids, consider Flonase nasal spray  I advised him to contact the office if his condition worsens, changes, fails to improve as anticipated and with any new problems. He expressed understanding with the diagnosis(es) and plan. [facility] the patient is at home and the provider is at [facility] and [address] to the emergency declaration under the [facility], [facility] authority and the [employer] Act, this Virtual  Visit was conducted, with patient's consent, to reduce the patient's risk of exposure to COVID-19 and provide continuity of care for an established patient. Services were provided through a video synchronous discussion virtually to substitute for in-person clinic visit. Leticia Gitelman, MD         PLEASE NOTE:   This document has been produced using voice recognition software. Unrecognized errors in transcription may be present.

## 2022-10-04 NOTE — PATIENT INSTRUCTIONS
Symptoms consistent with seasonal allergic symptoms versus viral URI, previously tested negative for COVID-19, symptoms not suggestive of sinusitis  Recommend supportive care, continue OTC antihistamines, Tylenol, fluids, consider Flonase nasal spray  I advised him to contact the office if his condition worsens, changes, fails to improve as anticipated and with any new problems. He expressed understanding with the diagnosis(es) and plan.

## 2022-10-04 NOTE — PROGRESS NOTES
Shreya Davis is a 81 y.o. male (: 1950) presenting to address:    Chief Complaint   Patient presents with    Cough    Nasal Congestion     And runny nose

## 2022-10-23 RX ORDER — SIMVASTATIN 10 MG/1
TABLET, FILM COATED ORAL
Qty: 90 TABLET | Refills: 1 | Status: SHIPPED | OUTPATIENT
Start: 2022-10-23

## 2022-11-20 DIAGNOSIS — E11.65 TYPE 2 DIABETES MELLITUS WITH HYPERGLYCEMIA, WITHOUT LONG-TERM CURRENT USE OF INSULIN (HCC): ICD-10-CM

## 2022-11-20 RX ORDER — SITAGLIPTIN 50 MG/1
TABLET, FILM COATED ORAL
Qty: 90 TABLET | Refills: 2 | Status: SHIPPED | OUTPATIENT
Start: 2022-11-20 | End: 2022-11-26

## 2022-11-25 DIAGNOSIS — E11.65 TYPE 2 DIABETES MELLITUS WITH HYPERGLYCEMIA, WITHOUT LONG-TERM CURRENT USE OF INSULIN (HCC): ICD-10-CM

## 2022-11-26 RX ORDER — SITAGLIPTIN 50 MG/1
TABLET, FILM COATED ORAL
Qty: 90 TABLET | Refills: 2 | Status: SHIPPED | OUTPATIENT
Start: 2022-11-26

## 2022-11-28 DIAGNOSIS — E11.65 TYPE 2 DIABETES MELLITUS WITH HYPERGLYCEMIA, WITHOUT LONG-TERM CURRENT USE OF INSULIN (HCC): ICD-10-CM

## 2023-01-29 NOTE — PROGRESS NOTES
HISTORY OF PRESENT ILLNESS  Rajiv Pringle is a 81 y.o. male. He reports a respiratory illness with COVID-19 testing positive on 1/9/2023, now with sinus drainage, sneezing, left ear popping    Mr#: [id number]      Past Medical History:   Diagnosis Date    CAD (coronary artery disease)     Constipation     Diabetes mellitus ([facility] [address] 8/7/2015    Diarrhea     Gastric ulcer 12/16/2014    On EGD in 2014. Hiatal hernia 12/16/2014    On EGD, 2014. With Dr. Burak Witt. History of Helicobacter pylori infection 12/16/2014    On EGD in 2014, Dr. Burak Witt. S/p Tx. Hyperlipidemia     Impotence of organic origin     Lactose intolerance     Low serum testosterone level     Nocturia     Other testicular hypofunction     Scrotal abscess 4/27/2018    Sinus congestion     Stented coronary artery 09/2013. Umbilical hernia without obstruction and without gangrene 4/27/2018    Vitamin D deficiency        Past Surgical History:   Procedure Laterality Date    HX COLONOSCOPY  03/20/2017    Tubular adenoma, 5-year follow-up, Dr. Burak Witt    HX COLONOSCOPY  07/13/2022    7 polyps excised, pathology pending,     HX CORONARY STENT PLACEMENT  09/2013    HX GI  04/02/2018    groin abcess     HX HERNIA REPAIR  2003    inguinial, right       Family History   Problem Relation Age of Onset    Hypertension Mother     Cancer Sister     Diabetes Brother        Allergies   Allergen Reactions    Amaryl [Glimepiride] Vertigo     Patient reported that this medications made him very dizzy and light headed.  It was discontinued by Dr Talha Bowie History     Tobacco Use   Smoking Status Former    Packs/day: 1.00    Types: Cigarettes   Smokeless Tobacco Never       Social History     Substance and Sexual Activity   Alcohol Use Yes    Comment: occasionally           Patient Active Problem List   Diagnosis Code    Hyperlipidemia E78.5    Lactose intolerance E73.9    Sinus congestion R09.81    Coronary artery disease of native artery of native heart with stable angina pectoris ([facility]) I25.118    Vitamin D deficiency E55.9    Low serum testosterone level R79.89    Hiatal hernia K44.9    History of Helicobacter pylori infection Z86.19    Gastric ulcer K25.9    Impotence of organic origin N52.9    Diabetes mellitus ([facility] [address] E11.9    Scrotal abscess A58.4    Umbilical hernia without obstruction and without gangrene K42.9    Nocturia R35.1    Stented coronary artery Z95.5    Chest pain R07.9    Coccydynia M53.3    Heart murmur R01.1    Perineal abscess L02.215    Controlled type 2 diabetes mellitus without complication, without long-term current use of insulin ([facility]) E11.9    Hypercholesterolemia E78.00    History of adenomatous polyp of colon Z86.010         Current Outpatient Medications:     Januvia 50 mg tablet, take 1 tablet by mouth once daily, Disp: 90 Tablet, Rfl: 2    simvastatin (ZOCOR) 10 mg tablet, take 1 tablet by mouth every evening, Disp: 90 Tablet, Rfl: 1    metFORMIN (GLUCOPHAGE) 1,000 mg tablet, take 1 tablet by mouth twice a day with meals, Disp: 180 Tablet, Rfl: 2    sildenafil citrate (Viagra) 100 mg tablet, Take 1 tablet 1 hour before intercourse, do not exceed 1 dose in 24 hours, Disp: 15 Tablet, Rfl: 5    OneTouch Ultra Test strip, use 1 TEST STRIP to TEST BLOOD SUGAR once daily, Disp: 100 Strip, Rfl: 2    omeprazole (PRILOSEC) 20 mg capsule, Take 20 mg by mouth daily. , Disp: , Rfl:     cholecalciferol, vitamin D3, 50 mcg (2,000 unit) tab, Take  by mouth daily. , Disp: , Rfl:     aspirin delayed-release 81 mg tablet, Take  by mouth daily. , Disp: , Rfl:     Lancets (ONETOUCH ULTRASOFT LANCETS) [facility], For once daily testing, Disp: 1 Each, Rfl: 5         Review of Systems   Constitutional:  Negative for chills and fever. HENT:  Negative for congestion, ear pain, hearing loss and sore throat. Respiratory:  Positive for cough and sputum production (\"phlegm\"). Musculoskeletal:  Negative for myalgias.    Neurological:  Negative for headaches. Visit Vitals  /80   Pulse 62   Temp 98.2 °F (36.8 °C) (Temporal)   Resp 16   Ht 5' 5\" (1.651 m)   Wt 181 lb (82.1 kg)   SpO2 97%   BMI 30.12 kg/m²       Physical Exam  Vitals and nursing note reviewed. Constitutional:       General: He is not in acute distress. Appearance: Normal appearance. He is well-developed. He is not ill-appearing. HENT:      Head: Normocephalic. Right Ear: Tympanic membrane and ear canal normal.      Left Ear: Tympanic membrane and ear canal normal.      Nose:      Comments: No paranasal sinus tenderness to percussion     Mouth/Throat:      Mouth: Mucous membranes are moist.      Pharynx: Oropharynx is clear. Eyes:      Conjunctiva/sclera: Conjunctivae normal.   Cardiovascular:      Rate and Rhythm: Normal rate and regular rhythm. Heart sounds: Normal heart sounds. Pulmonary:      Effort: Pulmonary effort is normal.      Breath sounds: Normal breath sounds. Musculoskeletal:      Cervical back: Neck supple. Lymphadenopathy:      Cervical: No cervical adenopathy. Skin:     General: Skin is warm and dry. Neurological:      Mental Status: He is alert and oriented to person, place, and time. Psychiatric:         Mood and Affect: Mood normal.         Behavior: Behavior normal.         Thought Content: Thought content normal.      Latest Reference Range & Units 2/11/22 15:39 8/10/22 07:22 1/30/23 07:44   Hemoglobin A1c, (calculated) 4.2 - 5.6 %  6.7 (H)    Hemoglobin A1c (POC) % 6.2  6.4     ASSESSMENT and PLAN    ICD-10-CM ICD-9-CM    1. Seasonal allergic rhinitis, unspecified trigger  J30.2 477.9       2. Type 2 diabetes mellitus with hyperglycemia, without long-term current use of insulin (HCC)  E11.65 250.00 AMB POC HEMOGLOBIN A1C     790.29 HEMOGLOBIN A1C WITH EAG      MICROALBUMIN, UR, RAND W/ MICROALB/CREAT RATIO      3.  Coronary artery disease of native artery of native heart with stable angina pectoris [facility]  I25.118 414.01      413.9 4. Dysfunction of left eustachian tube  H69.82 381.81       5. Routine health maintenance  Z00.00 V70.0 CBC WITH AUTOMATED DIFF      HEMOGLOBIN A1C WITH EAG      URINALYSIS W/ RFLX MICROSCOPIC      TSH 3RD GENERATION      MICROALBUMIN, UR, RAND W/ MICROALB/CREAT RATIO      METABOLIC PANEL, COMPREHENSIVE      LIPID PANEL      PSA SCREENING (SCREENING)      6. Hypercholesterolemia  E78.00 272.0 TSH 3RD GENERATION      LIPID PANEL      Current status:  Current symptoms likely secondary to seasonal allergy and eustachian tube dysfunction  Type 2 diabetes well controlled without Januvia  No symptoms of cardiac ischemia    Health maintenance recommendations:  COVID-19 immunization with bivalent vaccine  Influenza immunization declined    Plan:  Pressure equalization exercises as demonstrated  OTC antihistamine such as Zyrtec (cetirizine), Allegra (fenofexadine) or Claritin(loratadine)  Schedule lab appointment followed by annual physical exam appointment after 8/15/2022, return sooner with any problems  Please always arrive at least 15 minutes before your scheduled appointment time  Crow Atnhony MD      PLEASE NOTE:   This document has been produced using voice recognition software. Unrecognized errors in transcription may be present.

## 2023-01-30 ENCOUNTER — OFFICE VISIT (OUTPATIENT)
Dept: FAMILY MEDICINE CLINIC | Age: 73
End: 2023-01-30
Payer: COMMERCIAL

## 2023-01-30 VITALS
DIASTOLIC BLOOD PRESSURE: 80 MMHG | WEIGHT: 181 LBS | SYSTOLIC BLOOD PRESSURE: 138 MMHG | RESPIRATION RATE: 16 BRPM | OXYGEN SATURATION: 97 % | TEMPERATURE: 98.2 F | HEART RATE: 62 BPM | BODY MASS INDEX: 30.16 KG/M2 | HEIGHT: 65 IN

## 2023-01-30 DIAGNOSIS — E78.00 HYPERCHOLESTEROLEMIA: ICD-10-CM

## 2023-01-30 DIAGNOSIS — E11.65 TYPE 2 DIABETES MELLITUS WITH HYPERGLYCEMIA, WITHOUT LONG-TERM CURRENT USE OF INSULIN (HCC): ICD-10-CM

## 2023-01-30 DIAGNOSIS — I25.118 CORONARY ARTERY DISEASE OF NATIVE ARTERY OF NATIVE HEART WITH STABLE ANGINA PECTORIS (HCC): ICD-10-CM

## 2023-01-30 DIAGNOSIS — Z00.00 ROUTINE HEALTH MAINTENANCE: ICD-10-CM

## 2023-01-30 DIAGNOSIS — H69.82 DYSFUNCTION OF LEFT EUSTACHIAN TUBE: ICD-10-CM

## 2023-01-30 DIAGNOSIS — J30.2 SEASONAL ALLERGIC RHINITIS, UNSPECIFIED TRIGGER: Primary | ICD-10-CM

## 2023-01-30 LAB — HBA1C MFR BLD HPLC: 6.4 %

## 2023-01-30 PROCEDURE — 99213 OFFICE O/P EST LOW 20 MIN: CPT | Performed by: FAMILY MEDICINE

## 2023-01-30 PROCEDURE — 3044F HG A1C LEVEL LT 7.0%: CPT | Performed by: FAMILY MEDICINE

## 2023-01-30 PROCEDURE — 83036 HEMOGLOBIN GLYCOSYLATED A1C: CPT | Performed by: FAMILY MEDICINE

## 2023-01-30 PROCEDURE — 1123F ACP DISCUSS/DSCN MKR DOCD: CPT | Performed by: FAMILY MEDICINE

## 2023-01-30 NOTE — PATIENT INSTRUCTIONS
Current status:  Current symptoms likely secondary to seasonal allergy and eustachian tube dysfunction  Type 2 diabetes well controlled without Januvia  No symptoms of cardiac ischemia    Health maintenance recommendations:  COVID-19 immunization with bivalent vaccine  Influenza immunization declined    Plan:  Pressure equalization exercises as demonstrated  OTC antihistamine such as Zyrtec (cetirizine), Allegra (fenofexadine) or Claritin(loratadine)  Schedule lab appointment followed by annual physical exam appointment after 8/15/2022, return sooner with any problems  Please always arrive at least 15 minutes before your scheduled appointment time

## 2023-01-30 NOTE — PROGRESS NOTES
Radha Chiu is a 81 y.o. male (: 1950) presenting to address:    Chief Complaint   Patient presents with    Ear Pain     No pain having drainage     Immunization/Injection     Declined flu shot . Vitals:    23 0726   BP: 138/80   Pulse: 62   Resp: 16   Temp: 98.2 °F (36.8 °C)   TempSrc: Temporal   SpO2: 97%   Weight: 181 lb (82.1 kg)   Height: 5' 5\" (1.651 m)   PainSc:   0 - No pain       Hearing/Vision:   No results found. Learning Assessment:     Learning Assessment 5/10/2021   PRIMARY LEARNER Patient   HIGHEST LEVEL OF EDUCATION - PRIMARY LEARNER  GRADUATED HIGH SCHOOL OR GED   BARRIERS PRIMARY LEARNER NONE   CO-LEARNER CAREGIVER No   PRIMARY LANGUAGE ENGLISH    NEED No   LEARNER PREFERENCE PRIMARY PICTURES   ANSWERED BY patient   RELATIONSHIP SELF     Depression Screening:     3 most recent PHQ Screens 2023   PHQ Not Done -   Little interest or pleasure in doing things Not at all   Feeling down, depressed, irritable, or hopeless Not at all   Total Score PHQ 2 0     Fall Risk Assessment:     Fall Risk Assessment, last 12 mths 2021   Able to walk? Yes   Fall in past 12 months? 0   Do you feel unsteady? 0   Are you worried about falling 0     Abuse Screening:     Abuse Screening Questionnaire 2021   Do you ever feel afraid of your partner? N   Are you in a relationship with someone who physically or mentally threatens you? N   Is it safe for you to go home?  Y     ADL Assessment:     ADL Assessment 2018   Feeding yourself No Help Needed   Getting from bed to chair No Help Needed   Getting dressed No Help Needed   Bathing or showering No Help Needed   Walk across the room (includes cane/walker) No Help Needed   Using the telphone No Help Needed   Taking your medications No Help Needed   Preparing meals No Help Needed   Managing money (expenses/bills) No Help Needed   Moderately strenuous housework (laundry) No Help Needed   Shopping for personal items (toiletries/medicines) No Help Needed   Shopping for groceries No Help Needed   Driving No Help Needed   Climbing a flight of stairs No Help Needed   Getting to places beyond walking distances No Help Needed        Coordination of Care Questionaire:   1. \"Have you been to the ER, urgent care clinic since your last visit? Hospitalized since your last visit? \" No    2. \"Have you seen or consulted any other health care providers outside of the [address] [place] [address] [place] since your last visit? \" No     3. For patients aged 39-70: Has the patient had a colonoscopy? Yes - no Care Gap present       Advanced Directive:   1. Do you have an Advanced Directive? NO    2. Would you like information on Advanced Directives?  NO

## 2023-02-05 DIAGNOSIS — Z00.00 ROUTINE HEALTH MAINTENANCE: Primary | ICD-10-CM

## 2023-02-05 DIAGNOSIS — E11.65 TYPE 2 DIABETES MELLITUS WITH HYPERGLYCEMIA, WITHOUT LONG-TERM CURRENT USE OF INSULIN (HCC): Primary | ICD-10-CM

## 2023-02-05 DIAGNOSIS — Z00.00 ROUTINE HEALTH MAINTENANCE: ICD-10-CM

## 2023-02-07 DIAGNOSIS — E78.00 HYPERCHOLESTEROLEMIA: ICD-10-CM

## 2023-02-07 DIAGNOSIS — Z00.00 ROUTINE HEALTH MAINTENANCE: ICD-10-CM

## 2023-02-07 DIAGNOSIS — E11.65 TYPE 2 DIABETES MELLITUS WITH HYPERGLYCEMIA, WITHOUT LONG-TERM CURRENT USE OF INSULIN (HCC): Primary | ICD-10-CM

## 2023-02-07 DIAGNOSIS — Z00.00 ROUTINE HEALTH MAINTENANCE: Primary | ICD-10-CM

## 2023-04-26 RX ORDER — SIMVASTATIN 10 MG
TABLET ORAL
Qty: 90 TABLET | Refills: 1 | Status: SHIPPED | OUTPATIENT
Start: 2023-04-26

## 2023-05-08 RX ORDER — OMEPRAZOLE 20 MG/1
20 CAPSULE, DELAYED RELEASE ORAL DAILY
Qty: 90 CAPSULE | Refills: 1 | Status: SHIPPED | OUTPATIENT
Start: 2023-05-08

## 2023-06-22 ENCOUNTER — TELEPHONE (OUTPATIENT)
Dept: FAMILY MEDICINE CLINIC | Facility: CLINIC | Age: 73
End: 2023-06-22

## 2023-06-22 NOTE — TELEPHONE ENCOUNTER
----- Message from [employer] sent at 6/21/2023  3:42 PM EDT -----  Subject: Message to Provider    QUESTIONS  Information for Provider? started with a mild headache, now he has itchy   throat, runny nose, possible sinus infection or strep throat. Angelica Che was   sick first. PT is requesting a medication for it. Please send script to? [facility]  [address] [facility]   [phone] - F [phone]. Please contact PT at [phone]  ---------------------------------------------------------------------------  --------------  [address] [employer]  [phone]; OK to leave message on voicemail  ---------------------------------------------------------------------------  --------------  SCRIPT ANSWERS  Relationship to Patient?  Self

## 2023-06-22 NOTE — TELEPHONE ENCOUNTER
Seen at urgent care and checked for strep and covid all negative told it was probably just a common cold.

## 2023-08-23 DIAGNOSIS — Z11.59 NEED FOR HEPATITIS C SCREENING TEST: Primary | ICD-10-CM

## 2023-08-24 NOTE — TELEPHONE ENCOUNTER
Refilled x90 days, the patient no showed for his appointment yesterday. Needs to reschedule.   No refills after this without appropriate prior evaluation

## 2023-08-24 NOTE — TELEPHONE ENCOUNTER
Last visit: 1/30/23  Next visit: No future appointments.   Last filled: 2/14/23; metformin; qty 180 w/1 refill

## 2023-08-28 NOTE — TELEPHONE ENCOUNTER
Future Appointments   Date Time Provider [address]   9/6/2023  3:30 PM Iris Copeland MD [facility]

## 2023-09-03 ASSESSMENT — ENCOUNTER SYMPTOMS
WHEEZING: 0
CONSTIPATION: 0
CHEST TIGHTNESS: 0
DIARRHEA: 0
ANAL BLEEDING: 0
VOMITING: 0
BLOOD IN STOOL: 0
ABDOMINAL PAIN: 0
SHORTNESS OF BREATH: 0
COUGH: 0
NAUSEA: 0
SORE THROAT: 0

## 2023-09-06 ENCOUNTER — HOSPITAL ENCOUNTER (OUTPATIENT)
Facility: HOSPITAL | Age: 73
Setting detail: SPECIMEN
Discharge: HOME | End: 2023-09-09
Payer: COMMERCIAL

## 2023-09-06 ENCOUNTER — OFFICE VISIT (OUTPATIENT)
Dept: FAMILY MEDICINE CLINIC | Facility: CLINIC | Age: 73
End: 2023-09-06
Payer: COMMERCIAL

## 2023-09-06 VITALS
OXYGEN SATURATION: 96 % | TEMPERATURE: 97.9 F | DIASTOLIC BLOOD PRESSURE: 80 MMHG | RESPIRATION RATE: 16 BRPM | BODY MASS INDEX: 30.32 KG/M2 | WEIGHT: 182 LBS | HEIGHT: 65 IN | HEART RATE: 75 BPM | SYSTOLIC BLOOD PRESSURE: 130 MMHG

## 2023-09-06 DIAGNOSIS — I25.118 CORONARY ARTERY DISEASE OF NATIVE ARTERY OF NATIVE HEART WITH STABLE ANGINA PECTORIS (HCC): ICD-10-CM

## 2023-09-06 DIAGNOSIS — E55.9 VITAMIN D DEFICIENCY: ICD-10-CM

## 2023-09-06 DIAGNOSIS — Z86.010 HISTORY OF ADENOMATOUS POLYP OF COLON: ICD-10-CM

## 2023-09-06 DIAGNOSIS — Z00.00 ROUTINE HEALTH MAINTENANCE: ICD-10-CM

## 2023-09-06 DIAGNOSIS — E78.00 HYPERCHOLESTEROLEMIA: ICD-10-CM

## 2023-09-06 DIAGNOSIS — E11.65 TYPE 2 DIABETES MELLITUS WITH HYPERGLYCEMIA, WITHOUT LONG-TERM CURRENT USE OF INSULIN (HCC): ICD-10-CM

## 2023-09-06 DIAGNOSIS — Z11.59 NEED FOR HEPATITIS C SCREENING TEST: ICD-10-CM

## 2023-09-06 DIAGNOSIS — E11.65 TYPE 2 DIABETES MELLITUS WITH HYPERGLYCEMIA, WITHOUT LONG-TERM CURRENT USE OF INSULIN (HCC): Primary | ICD-10-CM

## 2023-09-06 LAB
ALBUMIN SERPL-MCNC: 3.7 G/DL (ref 3.4–5)
ALBUMIN/GLOB SERPL: 1 (ref 0.8–1.7)
ALP SERPL-CCNC: 83 U/L (ref 45–117)
ALT SERPL-CCNC: 37 U/L (ref 16–61)
ANION GAP SERPL CALC-SCNC: 7 MMOL/L (ref 3–18)
APPEARANCE UR: CLEAR
AST SERPL-CCNC: 23 U/L (ref 10–38)
BASOPHILS # BLD: 0 K/UL (ref 0–0.1)
BASOPHILS NFR BLD: 1 % (ref 0–2)
BILIRUB SERPL-MCNC: 0.3 MG/DL (ref 0.2–1)
BILIRUB UR QL: NEGATIVE
BUN SERPL-MCNC: 18 MG/DL (ref 7–18)
BUN/CREAT SERPL: 16 (ref 12–20)
CALCIUM SERPL-MCNC: 9.6 MG/DL (ref 8.5–10.1)
CHLORIDE SERPL-SCNC: 107 MMOL/L (ref 100–111)
CHOLEST SERPL-MCNC: 144 MG/DL
CO2 SERPL-SCNC: 29 MMOL/L (ref 21–32)
COLOR UR: YELLOW
CREAT SERPL-MCNC: 1.15 MG/DL (ref 0.6–1.3)
CREAT UR-MCNC: 248 MG/DL (ref 30–125)
DIFFERENTIAL METHOD BLD: ABNORMAL
EOSINOPHIL # BLD: 0.1 K/UL (ref 0–0.4)
EOSINOPHIL NFR BLD: 2 % (ref 0–5)
ERYTHROCYTE [DISTWIDTH] IN BLOOD BY AUTOMATED COUNT: 13.6 % (ref 11.6–14.5)
EST. AVERAGE GLUCOSE BLD GHB EST-MCNC: 157 MG/DL
GLOBULIN SER CALC-MCNC: 3.8 G/DL (ref 2–4)
GLUCOSE SERPL-MCNC: 136 MG/DL (ref 74–99)
GLUCOSE UR STRIP.AUTO-MCNC: 100 MG/DL
HBA1C MFR BLD: 7.1 % (ref 4.2–5.6)
HBA1C MFR BLD: 7.6 %
HCT VFR BLD AUTO: 39.7 % (ref 36–48)
HDLC SERPL-MCNC: 31 MG/DL (ref 40–60)
HDLC SERPL: 4.6 (ref 0–5)
HGB BLD-MCNC: 12.9 G/DL (ref 13–16)
HGB UR QL STRIP: NEGATIVE
IMM GRANULOCYTES # BLD AUTO: 0 K/UL (ref 0–0.04)
IMM GRANULOCYTES NFR BLD AUTO: 0 % (ref 0–0.5)
KETONES UR QL STRIP.AUTO: ABNORMAL MG/DL
LDLC SERPL CALC-MCNC: 50 MG/DL (ref 0–100)
LEUKOCYTE ESTERASE UR QL STRIP.AUTO: NEGATIVE
LIPID PANEL: ABNORMAL
LYMPHOCYTES # BLD: 2.8 K/UL (ref 0.9–3.6)
LYMPHOCYTES NFR BLD: 47 % (ref 21–52)
MCH RBC QN AUTO: 30.1 PG (ref 24–34)
MCHC RBC AUTO-ENTMCNC: 32.5 G/DL (ref 31–37)
MCV RBC AUTO: 92.5 FL (ref 78–100)
MICROALBUMIN UR-MCNC: 2.49 MG/DL (ref 0–3)
MICROALBUMIN/CREAT UR-RTO: 10 MG/G (ref 0–30)
MONOCYTES # BLD: 0.6 K/UL (ref 0.05–1.2)
MONOCYTES NFR BLD: 11 % (ref 3–10)
NEUTS SEG # BLD: 2.4 K/UL (ref 1.8–8)
NEUTS SEG NFR BLD: 40 % (ref 40–73)
NITRITE UR QL STRIP.AUTO: NEGATIVE
NRBC # BLD: 0 K/UL (ref 0–0.01)
NRBC BLD-RTO: 0 PER 100 WBC
PH UR STRIP: 5.5 (ref 5–8)
PLATELET # BLD AUTO: 69 K/UL (ref 135–420)
PMV BLD AUTO: 12.4 FL (ref 9.2–11.8)
POTASSIUM SERPL-SCNC: 3.8 MMOL/L (ref 3.5–5.5)
PROT SERPL-MCNC: 7.5 G/DL (ref 6.4–8.2)
PROT UR STRIP-MCNC: NEGATIVE MG/DL
PSA SERPL-MCNC: 0.5 NG/ML (ref 0–4)
RBC # BLD AUTO: 4.29 M/UL (ref 4.35–5.65)
SODIUM SERPL-SCNC: 143 MMOL/L (ref 136–145)
SP GR UR REFRACTOMETRY: 1.03 (ref 1–1.03)
TRIGL SERPL-MCNC: 315 MG/DL
TSH SERPL DL<=0.05 MIU/L-ACNC: 1.32 UIU/ML (ref 0.36–3.74)
UROBILINOGEN UR QL STRIP.AUTO: 1 EU/DL (ref 0.2–1)
VLDLC SERPL CALC-MCNC: 63 MG/DL
WBC # BLD AUTO: 6 K/UL (ref 4.6–13.2)

## 2023-09-06 PROCEDURE — 82570 ASSAY OF URINE CREATININE: CPT

## 2023-09-06 PROCEDURE — 99397 PER PM REEVAL EST PAT 65+ YR: CPT | Performed by: FAMILY MEDICINE

## 2023-09-06 PROCEDURE — 36415 COLL VENOUS BLD VENIPUNCTURE: CPT

## 2023-09-06 PROCEDURE — 81003 URINALYSIS AUTO W/O SCOPE: CPT

## 2023-09-06 PROCEDURE — G0103 PSA SCREENING: HCPCS

## 2023-09-06 PROCEDURE — 82043 UR ALBUMIN QUANTITATIVE: CPT

## 2023-09-06 PROCEDURE — 83036 HEMOGLOBIN GLYCOSYLATED A1C: CPT

## 2023-09-06 PROCEDURE — 80061 LIPID PANEL: CPT

## 2023-09-06 PROCEDURE — 83036 HEMOGLOBIN GLYCOSYLATED A1C: CPT | Performed by: FAMILY MEDICINE

## 2023-09-06 PROCEDURE — 84443 ASSAY THYROID STIM HORMONE: CPT

## 2023-09-06 PROCEDURE — 86803 HEPATITIS C AB TEST: CPT

## 2023-09-06 PROCEDURE — 80053 COMPREHEN METABOLIC PANEL: CPT

## 2023-09-06 PROCEDURE — 85025 COMPLETE CBC W/AUTO DIFF WBC: CPT

## 2023-09-06 SDOH — ECONOMIC STABILITY: FOOD INSECURITY: WITHIN THE PAST 12 MONTHS, YOU WORRIED THAT YOUR FOOD WOULD RUN OUT BEFORE YOU GOT MONEY TO BUY MORE.: NEVER TRUE

## 2023-09-06 SDOH — ECONOMIC STABILITY: INCOME INSECURITY: HOW HARD IS IT FOR YOU TO PAY FOR THE VERY BASICS LIKE FOOD, HOUSING, MEDICAL CARE, AND HEATING?: NOT VERY HARD

## 2023-09-06 SDOH — ECONOMIC STABILITY: HOUSING INSECURITY
IN THE LAST 12 MONTHS, WAS THERE A TIME WHEN YOU DID NOT HAVE A STEADY PLACE TO SLEEP OR SLEPT IN A SHELTER (INCLUDING NOW)?: NO

## 2023-09-06 SDOH — ECONOMIC STABILITY: FOOD INSECURITY: WITHIN THE PAST 12 MONTHS, THE FOOD YOU BOUGHT JUST DIDN'T LAST AND YOU DIDN'T HAVE MONEY TO GET MORE.: NEVER TRUE

## 2023-09-06 NOTE — PATIENT INSTRUCTIONS
Current Status:  Type 2 diabetes less well controlled, patient acknowledges significant dietary indiscretion  Denies symptoms of cardiac ischemia  Status of hyperlipidemia and vitamin D deficiency to be determined pending lab results  History of tubular adenomas of the colon with most recent colonoscopy 7/13/2022, 7 polyps excised  Recommended follow-up colonoscopy in 3 years    Health Maintenance Recommendations:  COVID-19 musician with bivalent vaccine  Shingrix immunization declines today, will reconsider at follow-up  Influenza immunization recommended later this fall  Diabetic eye exam due October 2023  Colonoscopy due 7/2025    Plan:  Lab studies ordered, further disposition pending lab results if indicated  Continue current medications pending lab results  Avoid dietary starch and sugar and as much as possible follow program of regular aerobic exercise. Return for follow-up with an in office hemoglobin A1c in 3 months, return sooner with any problems  Please always arrive at least 15 minutes before your scheduled appointment time.

## 2023-09-06 NOTE — PROGRESS NOTES
Karolyn Marti is a 72 y.o. male (: 1950) presenting to address:    Chief Complaint   Patient presents with    Annual Exam    Back Pain     Between shoulder blades        There were no vitals filed for this visit. Coordination of Care Questionaire:   1. \"Have you been to the ER, urgent care clinic since your last visit? Hospitalized since your last visit? \" No    2. \"Have you seen or consulted any other health care providers outside of the [address] [place] [address] since your last visit? \" No     3. For patients aged 43-73: Has the patient had a colonoscopy / FIT/ Cologuard? Yes - no Care Gap present      If the patient is female:    4. For patients aged 43-66: Has the patient had a mammogram within the past 2 years? NA - based on age or sex      11. For patients aged 21-65: Has the patient had a pap smear? NA - based on age or sex    Advanced Directive:   1. Do you have an Advanced Directive? No    2. Would you like information on Advanced Directives?  No
abdominal tenderness. Skin:     General: Skin is warm and dry. Findings: No rash. Neurological:      Mental Status: He is alert and oriented to person, place, and time. Psychiatric:         Mood and Affect: Mood normal.         Behavior: Behavior normal.         Thought Content: Thought content normal.      Diabetic foot exam:   Left Foot:   Visual Exam: normal discolored dystrophic toenails   Pulse DP: 2+ (normal)   Filament test: normal sensation     Right Foot:   Visual Exam: normal discolored dystrophic toenails   Pulse DP: 2+ (normal)   Filament test: normal sensation           ASSESSMENT and PLAN    1. Type 2 diabetes mellitus with hyperglycemia, without long-term current use of insulin ([address])  2. Coronary artery disease of native artery of native heart with stable angina pectoris ([address])  3. Hypercholesterolemia  4. Vitamin D deficiency  5. History of adenomatous polyp of colon      Current Status:  Type 2 diabetes less well controlled, patient acknowledges significant dietary indiscretion  He indicates that he feels sure he can improve control without medication change and would prefer to try that first.  Denies symptoms of cardiac ischemia  Status of hyperlipidemia and vitamin D deficiency to be determined pending lab results  History of tubular adenomas of the colon with most recent colonoscopy 7/13/2022, 7 polyps excised  Recommended follow-up colonoscopy in 3 years    Health Maintenance Recommendations:  COVID-19 musician with bivalent vaccine  Shingrix immunization declines today, will reconsider at follow-up  Influenza immunization recommended later this fall  Diabetic eye exam due October 2023  Colonoscopy due 7/2025    Plan:  Lab studies ordered, further disposition pending lab results if indicated  Continue current medications pending lab results  Avoid dietary starch and sugar and as much as possible follow program of regular aerobic exercise.   Return for follow-up with an in office

## 2023-09-07 LAB
HCV AB SER IA-ACNC: 0.03 INDEX
HCV AB SERPL QL IA: NEGATIVE
HEPATITIS C COMMENT: NORMAL

## 2023-09-08 DIAGNOSIS — D69.6 THROMBOCYTOPENIA (HCC): Primary | ICD-10-CM

## 2023-10-06 ENCOUNTER — HOSPITAL ENCOUNTER (OUTPATIENT)
Facility: HOSPITAL | Age: 73
Setting detail: SPECIMEN
End: 2023-10-06
Payer: COMMERCIAL

## 2023-10-06 DIAGNOSIS — D69.6 THROMBOCYTOPENIA (HCC): ICD-10-CM

## 2023-10-06 LAB — PLATELET # BLD AUTO: 71 K/UL (ref 135–420)

## 2023-10-06 PROCEDURE — 36415 COLL VENOUS BLD VENIPUNCTURE: CPT

## 2023-10-06 PROCEDURE — 85049 AUTOMATED PLATELET COUNT: CPT

## 2023-11-21 ENCOUNTER — TELEPHONE (OUTPATIENT)
Dept: FAMILY MEDICINE CLINIC | Facility: CLINIC | Age: 73
End: 2023-11-21

## 2023-11-21 NOTE — TELEPHONE ENCOUNTER
Cornelio Burrows from [address] [place] [address] called on behalf of the pt. They are requesting the report from 1/10/23 on behalf of the pt to be faxed to [phone].  Please advise

## 2023-11-22 NOTE — TELEPHONE ENCOUNTER
I called and spoke with Jayashree Pittman she is looking for the colonoscopy report from 1/10/23 I was only able to find a note with procedure details but its from 7/14/22 . Copy faxed to Jayashree Pittman .

## 2023-11-24 NOTE — TELEPHONE ENCOUNTER
Rasheed Dalal called for their medication refill.    Last Office visit:  9/6/2023    Last Filled: 8/24/2023; Qty 180 w/ 0 refills    Follow up visit:    Future Appointments   Date Time Provider Department Center   12/6/2023  3:30 PM Mor Ng MD [facility]

## 2024-01-29 ENCOUNTER — OFFICE VISIT (OUTPATIENT)
Dept: FAMILY MEDICINE CLINIC | Facility: CLINIC | Age: 74
End: 2024-01-29
Payer: COMMERCIAL

## 2024-01-29 VITALS
OXYGEN SATURATION: 97 % | BODY MASS INDEX: 27.66 KG/M2 | DIASTOLIC BLOOD PRESSURE: 80 MMHG | WEIGHT: 166 LBS | RESPIRATION RATE: 16 BRPM | HEIGHT: 65 IN | HEART RATE: 80 BPM | SYSTOLIC BLOOD PRESSURE: 136 MMHG | TEMPERATURE: 98.2 F

## 2024-01-29 DIAGNOSIS — E11.65 TYPE 2 DIABETES MELLITUS WITH HYPERGLYCEMIA, WITHOUT LONG-TERM CURRENT USE OF INSULIN (HCC): Primary | ICD-10-CM

## 2024-01-29 LAB — GLUCOSE, POC: 426 MG/DL

## 2024-01-29 PROCEDURE — 99214 OFFICE O/P EST MOD 30 MIN: CPT | Performed by: FAMILY MEDICINE

## 2024-01-29 PROCEDURE — 82947 ASSAY GLUCOSE BLOOD QUANT: CPT | Performed by: FAMILY MEDICINE

## 2024-01-29 PROCEDURE — 1123F ACP DISCUSS/DSCN MKR DOCD: CPT | Performed by: FAMILY MEDICINE

## 2024-01-29 RX ORDER — INSULIN HUMAN 100 [IU]/ML
INJECTION, SUSPENSION SUBCUTANEOUS
Qty: 3 ADJUSTABLE DOSE PRE-FILLED PEN SYRINGE | Refills: 3 | Status: SHIPPED | OUTPATIENT
Start: 2024-01-29

## 2024-01-29 RX ORDER — BLOOD SUGAR DIAGNOSTIC
STRIP MISCELLANEOUS
Qty: 100 EACH | Refills: 1 | Status: SHIPPED | OUTPATIENT
Start: 2024-01-29

## 2024-01-29 RX ORDER — FERROUS SULFATE 325(65) MG
325 TABLET ORAL
COMMUNITY

## 2024-01-29 ASSESSMENT — PATIENT HEALTH QUESTIONNAIRE - PHQ9
SUM OF ALL RESPONSES TO PHQ QUESTIONS 1-9: 1
SUM OF ALL RESPONSES TO PHQ QUESTIONS 1-9: 1
1. LITTLE INTEREST OR PLEASURE IN DOING THINGS: 1
2. FEELING DOWN, DEPRESSED OR HOPELESS: 0
SUM OF ALL RESPONSES TO PHQ QUESTIONS 1-9: 1
SUM OF ALL RESPONSES TO PHQ QUESTIONS 1-9: 1
SUM OF ALL RESPONSES TO PHQ9 QUESTIONS 1 & 2: 1

## 2024-01-29 NOTE — PATIENT INSTRUCTIONS
Current Status:  Marked hyperglycemia with weight loss since starting prednisone    Health Maintenance Recommendations:  Keep current with COVID-19 immunization guidelines  Consider the Shingrix immunization series to reduce the risk of shingles and an RSV immunization to reduce the risk and adverse effects of a respiratory syncytial virus infection, available at the pharmacy  Influenza immunization  Low-dose chest CT to screen for lung cancer  Ultrasound to screen for abdominal aortic aneurysm  Diabetic eye exam    Plan:  Begin 70/30 insulin 10 units twice daily after eating, after the first 2 doses check glucose levels and do not administer insulin if glucose level is less than 150.  Return for follow-up in 7-10 days or sooner with any problems  Return for follow-up as scheduled on 4/19/2024 or sooner with any problems

## 2024-01-29 NOTE — PROGRESS NOTES
Rasheed Dalal is a 73 y.o. male (: 1950) presenting to address:    Chief Complaint   Patient presents with    Blood Sugar Problem       Vitals:    24 1350   BP: 136/80   Pulse: 80   Resp: 16   Temp: 98.2 °F (36.8 °C)   SpO2: 97%       \"Have you been to the ER, urgent care clinic since your last visit?  Hospitalized since your last visit?\"    NO    “Have you seen or consulted any other health care providers outside of [facility] since your last visit?”    NO

## 2024-01-29 NOTE — PROGRESS NOTES
HISTORY OF PRESENT ILLNESS  Rasheed Dalal  is a 73 y.o. y.o. male    He presents with concern about elevated glucose readings.  At his most recent follow-up on 12/19/2023 hemoglobin A1c was 7.1%.  It was noted that prednisone had been prescribed by hematology oncology for treatment of thrombocytopenia.  Today he reports that he continues to take prednisone daily alternating 20 mg with 10 mg and has noted a marked elevation in glucose levels.  He reports weight loss and polyuria.  In addition he has been drinking fruit juice and in general not avoiding sugar.        Mr#: [id number]      Past Medical History:   Diagnosis Date    CAD (coronary artery disease)     Constipation     Diabetes mellitus (HCC) 8/7/2015    Diarrhea     Gastric ulcer 12/16/2014    On EGD in 2014.      Hiatal hernia 12/16/2014    On EGD, 2014.  With Dr. Werner.    History of Helicobacter pylori infection 12/16/2014    On EGD in 2014, Dr. Werner.  S/p Tx.    Hyperlipidemia     Impotence of organic origin     Lactose intolerance     Low serum testosterone level     Nocturia     Other testicular hypofunction     Scrotal abscess 4/27/2018    Sinus congestion     Stented coronary artery 09/2013.    Umbilical hernia without obstruction and without gangrene 4/27/2018    Vitamin D deficiency        Past Surgical History:   Procedure Laterality Date    COLONOSCOPY  03/20/2017    Tubular adenoma, 5-year follow-up, Dr. Werner    COLONOSCOPY  07/13/2022    7 polyps excised, pathology pending, , 3-year follow-up    CORONARY ANGIOPLASTY WITH STENT PLACEMENT  09/2013    GI  04/02/2018    groin abcess     HERNIA REPAIR  2003    inguinial, right       Family History   Problem Relation Age of Onset    Hypertension Mother     Diabetes Brother     Cancer Sister        Allergies   Allergen Reactions    Glimepiride Dizziness or Vertigo     Patient reported that this medications made him very dizzy and light headed. It was discontinued by Dr Rodriguez

## 2024-02-09 ENCOUNTER — OFFICE VISIT (OUTPATIENT)
Dept: FAMILY MEDICINE CLINIC | Facility: CLINIC | Age: 74
End: 2024-02-09

## 2024-02-09 VITALS
HEART RATE: 78 BPM | HEIGHT: 65 IN | TEMPERATURE: 97.8 F | WEIGHT: 171 LBS | RESPIRATION RATE: 16 BRPM | SYSTOLIC BLOOD PRESSURE: 130 MMHG | DIASTOLIC BLOOD PRESSURE: 80 MMHG | BODY MASS INDEX: 28.49 KG/M2 | OXYGEN SATURATION: 96 %

## 2024-02-09 DIAGNOSIS — E11.65 TYPE 2 DIABETES MELLITUS WITH HYPERGLYCEMIA, WITH LONG-TERM CURRENT USE OF INSULIN (HCC): Primary | ICD-10-CM

## 2024-02-09 DIAGNOSIS — Z79.4 TYPE 2 DIABETES MELLITUS WITH HYPERGLYCEMIA, WITH LONG-TERM CURRENT USE OF INSULIN (HCC): Primary | ICD-10-CM

## 2024-02-09 LAB — GLUCOSE, POC: 86 MG/DL

## 2024-02-09 RX ORDER — BLOOD-GLUCOSE,RECEIVER,CONT
EACH MISCELLANEOUS
Qty: 1 EACH | Refills: 3 | Status: SHIPPED | OUTPATIENT
Start: 2024-02-09

## 2024-02-09 RX ORDER — BLOOD-GLUCOSE SENSOR
EACH MISCELLANEOUS
Qty: 1 EACH | Refills: 3 | Status: SHIPPED | OUTPATIENT
Start: 2024-02-09

## 2024-02-09 NOTE — PATIENT INSTRUCTIONS
Current Status:  Hyperglycemia improving    Health Maintenance Recommendations: To be discussed at annual physical exam in September  Keep current with COVID-19 immunization guidelines  Consider the Shingrix immunization series to reduce the risk of shingles, and RSV immunization to reduce the risk and adverse effects of a respiratory syncytial virus infection, available at the pharmacy  Influenza immunization-on predisone  Low-dose chest CT scan screening for lung cancer  Abdominal aortic aneurysm screening      Plan:  Continue twice daily 70/30 insulin injections, 10 units  Check glucose level prior to each injection and do not inject if the level is not higher than 120  Call back with the name of an unplanned glucose meter if unable to fill the prescription for the florentin monitor  Avoid dietary starch and sugar  Return for follow-up in 1 month or sooner with any problems  Fasting lab appointment followed by annual physical exam appointment due after 9/6/2024

## 2024-02-09 NOTE — PROGRESS NOTES
HISTORY OF PRESENT ILLNESS  Rasheed Dalal  is a 73 y.o. y.o. male    He returns for follow-up after evaluation 11 days ago with marked worsening of diabetic control and a random blood sugar of 426 with associated symptoms including weight loss, polyuria and polydipsia.  This is attributed to continuous treatment with prednisone prescribed by his hematology oncology consultant for management of thrombocytopenia.  The patient also acknowledged noncompliance with recommendations to avoid starch and sugar.  He was started on 70/30 insulin 10 units twice daily and asked to return at this interval for follow-up.  Today he reports feeling better with decrease in his polydipsia and polyuria.  He has been able to gain some weight.  He has not been checking glucose levels before injecting the 70/30 insulin and states that he checks his glucose once a day and it has always 200 or higher.  He has not completely discontinued fruit juice and other high glycemic index foods and beverages.        Mr#: [id number]      Past Medical History:   Diagnosis Date    CAD (coronary artery disease)     Constipation     Diabetes mellitus (HCC) 8/7/2015    Diarrhea     Gastric ulcer 12/16/2014    On EGD in 2014.      Hiatal hernia 12/16/2014    On EGD, 2014.  With Dr. Werner.    History of Helicobacter pylori infection 12/16/2014    On EGD in 2014, Dr. Werner.  S/p Tx.    Hyperlipidemia     Impotence of organic origin     Lactose intolerance     Low serum testosterone level     Nocturia     Other testicular hypofunction     Scrotal abscess 4/27/2018    Sinus congestion     Stented coronary artery 09/2013.    Umbilical hernia without obstruction and without gangrene 4/27/2018    Vitamin D deficiency        Past Surgical History:   Procedure Laterality Date    COLONOSCOPY  03/20/2017    Tubular adenoma, 5-year follow-up, Dr. Werner    COLONOSCOPY  07/13/2022    7 polyps excised, pathology pending, , 3-year follow-up    CORONARY

## 2024-02-09 NOTE — PROGRESS NOTES
Rasheed Dalal is a 73 y.o. male (: 1950) presenting to address:    Chief Complaint   Patient presents with    Blood Sugar Problem     Blood sugar 86 today.        Vitals:    24 1512   BP: 130/80   Pulse: 78   Resp: 16   Temp: 97.8 °F (36.6 °C)   SpO2: 96%       \"Have you been to the ER, urgent care clinic since your last visit?  Hospitalized since your last visit?\"    NO    “Have you seen or consulted any other health care providers outside of [facility] since your last visit?”    NO

## 2024-03-06 NOTE — PROGRESS NOTES
HISTORY OF PRESENT ILLNESS  Rasheed Dalal  is a 73 y.o. y.o. male    He returns for follow-up of type 2 diabetes with a recent marked hyperglycemia secondary to therapy with prednisone prescribed by his hematology oncology consultant for treatment of chronic immune mediated thrombocytopenia.  At hematology follow-up on 2/27/2024 he was advised to decrease his daily prednisone dose to 10 mg and after 1 week if his platelet count remained at 100,000 or higher this would be decreased to 5 mg daily with plans for a 3-week follow-up interval.    Today he reports that he thought he was taking 5 mg daily but he is taking one half of a 20 mg pill and therefore realizes he is still taking 10 mg daily.  He has been prescribed a continuous glucose monitor but has been afraid to try it because of the size of the associated needle.  He continues to use twice daily 70/30 insulin and reports that his glucose levels are generally in the low 200s.        Mr#: [id number]      Past Medical History:   Diagnosis Date    CAD (coronary artery disease)     Constipation     Diabetes mellitus (HCC) 8/7/2015    Diarrhea     Gastric ulcer 12/16/2014    On EGD in 2014.      Hiatal hernia 12/16/2014    On EGD, 2014.  With Dr. Werner.    History of Helicobacter pylori infection 12/16/2014    On EGD in 2014, Dr. Werner.  S/p Tx.    Hyperlipidemia     Impotence of organic origin     Lactose intolerance     Low serum testosterone level     Nocturia     Other testicular hypofunction     Scrotal abscess 4/27/2018    Sinus congestion     Stented coronary artery 09/2013.    Umbilical hernia without obstruction and without gangrene 4/27/2018    Vitamin D deficiency        Past Surgical History:   Procedure Laterality Date    COLONOSCOPY  03/20/2017    Tubular adenoma, 5-year follow-up, Dr. Werner    COLONOSCOPY  07/13/2022    7 polyps excised, pathology pending, , 3-year follow-up    CORONARY ANGIOPLASTY WITH STENT PLACEMENT  09/2013    GI

## 2024-03-08 ENCOUNTER — OFFICE VISIT (OUTPATIENT)
Dept: FAMILY MEDICINE CLINIC | Facility: CLINIC | Age: 74
End: 2024-03-08

## 2024-03-08 VITALS
HEIGHT: 65 IN | WEIGHT: 175 LBS | DIASTOLIC BLOOD PRESSURE: 76 MMHG | HEART RATE: 78 BPM | OXYGEN SATURATION: 97 % | SYSTOLIC BLOOD PRESSURE: 128 MMHG | RESPIRATION RATE: 18 BRPM | BODY MASS INDEX: 29.16 KG/M2 | TEMPERATURE: 98 F

## 2024-03-08 DIAGNOSIS — D69.3 AUTOIMMUNE THROMBOCYTOPENIA (HCC): ICD-10-CM

## 2024-03-08 DIAGNOSIS — E11.65 TYPE 2 DIABETES MELLITUS WITH HYPERGLYCEMIA, WITH LONG-TERM CURRENT USE OF INSULIN (HCC): Primary | ICD-10-CM

## 2024-03-08 DIAGNOSIS — Z79.4 TYPE 2 DIABETES MELLITUS WITH HYPERGLYCEMIA, WITH LONG-TERM CURRENT USE OF INSULIN (HCC): Primary | ICD-10-CM

## 2024-03-08 LAB — GLUCOSE, POC: 278 MG/DL

## 2024-03-08 RX ORDER — PREDNISONE 5 MG/1
5 TABLET ORAL DAILY
Qty: 30 TABLET | Refills: 0 | Status: SHIPPED | OUTPATIENT
Start: 2024-03-08

## 2024-03-08 NOTE — PATIENT INSTRUCTIONS
Current Status:  Diabetic control not much improved but has just decreased the dose of prednisone from 20 to 10 mg daily, has been advised to take 5 mg daily now but only has 20 mg tablets  Random glucose 278, patient is reluctant to start any new diabetic medications confident that he will soon be off prednisone and his glucose will be well-controlled again on metformin alone    Health Maintenance Recommendations:  Shingrix immunization-deferred, on prednisone  Influenza immunization-deferred, on  Low-dose chest CT screening for lung cancer  Abdominal aortic aneurysm ultrasound screening  Diabetic eye exam    Plan:  Continue prednisone 5 mg daily with future dosing as directed by the hematology consultant  Continue metformin and twice daily 70/30 insulin with parameters previously provided  Return for follow-up here in 2 weeks or sooner with any problems  Please schedule fasting lab appointment followed by an annual physical exam appointment after 9/6/2024  Please always arrive at least 15 minutes before your scheduled appointment time.

## 2024-03-16 DIAGNOSIS — E11.65 TYPE 2 DIABETES MELLITUS WITH HYPERGLYCEMIA, WITHOUT LONG-TERM CURRENT USE OF INSULIN (HCC): ICD-10-CM

## 2024-03-18 RX ORDER — INSULIN HUMAN 100 [IU]/ML
INJECTION, SUSPENSION SUBCUTANEOUS
Qty: 3 ML | Refills: 3 | Status: SHIPPED | OUTPATIENT
Start: 2024-03-18

## 2024-03-18 NOTE — TELEPHONE ENCOUNTER
Last refilled 1/29/24 for 3 with 3 refills . Last ov 3/824   Future Appointments   Date Time Provider Department Center   4/8/2024  3:30 PM Mor Ng MD [facility]   4/19/2024  3:00 PM Mor Ng MD [facility]

## 2024-04-07 PROBLEM — I25.118 CORONARY ARTERY DISEASE OF NATIVE ARTERY OF NATIVE HEART WITH STABLE ANGINA PECTORIS (HCC): Status: RESOLVED | Noted: 2020-03-30 | Resolved: 2024-04-07

## 2024-04-07 NOTE — PROGRESS NOTES
HISTORY OF PRESENT ILLNESS  Rasheed Dalal  is a 73 y.o. y.o. male    He presents for follow-up of type 2 diabetes which has been in poor control because of required treatment of immune mediated thrombocytopenia with prednisone.  He has been reluctant to accept any modification of his diabetes regimen because he has anticipated discontinuation of prednisone as his thrombocytopenia improves.  He is currently taking metformin 1000 mg twice daily and twice daily 70/30 insulin which he adjusts somewhat based on his glucose readings.        Mr#: [id number]      Past Medical History:   Diagnosis Date    CAD (coronary artery disease)     Constipation     Diabetes mellitus (HCC) 8/7/2015    Diarrhea     Gastric ulcer 12/16/2014    On EGD in 2014.      Hiatal hernia 12/16/2014    On EGD, 2014.  With Dr. Werner.    History of Helicobacter pylori infection 12/16/2014    On EGD in 2014, Dr. Werner.  S/p Tx.    Hyperlipidemia     Impotence of organic origin     Lactose intolerance     Low serum testosterone level     Nocturia     Other testicular hypofunction     Scrotal abscess 4/27/2018    Sinus congestion     Stented coronary artery 09/2013.    Umbilical hernia without obstruction and without gangrene 4/27/2018    Vitamin D deficiency        Past Surgical History:   Procedure Laterality Date    COLONOSCOPY  03/20/2017    Tubular adenoma, 5-year follow-up, Dr. Werner    COLONOSCOPY  07/13/2022    7 polyps excised, pathology pending, , 3-year follow-up    CORONARY ANGIOPLASTY WITH STENT PLACEMENT  09/2013    GI  04/02/2018    groin abcess     HERNIA REPAIR  2003    inguinial, right       Family History   Problem Relation Age of Onset    Hypertension Mother     Diabetes Brother     Cancer Sister        Allergies   Allergen Reactions    Glimepiride Dizziness or Vertigo     Patient reported that this medications made him very dizzy and light headed. It was discontinued by Dr Rodriguez       Social History     Tobacco Use

## 2024-04-07 NOTE — PROGRESS NOTES
HISTORY OF PRESENT ILLNESS  Rasheed Dalal  is a 73 y.o. y.o. male    HPI    Mr#: [id number]      Past Medical History:   Diagnosis Date    CAD (coronary artery disease)     Constipation     Diabetes mellitus (HCC) 2015    Diarrhea     Gastric ulcer 2014    On EGD in .      Hiatal hernia 2014    On EGD, .  With Dr. Werner.    History of Helicobacter pylori infection 2014    On EGD in , Dr. Werner.  S/p Tx.    Hyperlipidemia     Impotence of organic origin     Lactose intolerance     Low serum testosterone level     Nocturia     Other testicular hypofunction     Scrotal abscess 2018    Sinus congestion     Stented coronary artery 2013.    Umbilical hernia without obstruction and without gangrene 2018    Vitamin D deficiency        Past Surgical History:   Procedure Laterality Date    COLONOSCOPY  2017    Tubular adenoma, 5-year follow-up, Dr. Werner    COLONOSCOPY  2022    7 polyps excised, pathology pending, , 3-year follow-up    CORONARY ANGIOPLASTY WITH STENT PLACEMENT  2013    GI  2018    groin abcess     HERNIA REPAIR  2003    inguinial, right       Family History   Problem Relation Age of Onset    Hypertension Mother     Diabetes Brother     Cancer Sister        Allergies   Allergen Reactions    Glimepiride Dizziness or Vertigo     Patient reported that this medications made him very dizzy and light headed. It was discontinued by Dr Rodriguez       Social History     Tobacco Use   Smoking Status Former    Current packs/day: 0.00    Average packs/day: 1 pack/day for 30.0 years (30.0 ttl pk-yrs)    Types: Cigarettes    Start date:     Quit date:     Years since quittin.2   Smokeless Tobacco Never       Social History     Substance and Sexual Activity   Alcohol Use Yes       Immunization History   Administered Date(s) Administered    COVID-19, MODERNA BLUE border, Primary or Immunocompromised, (age 12y+), IM, 100 mcg/0.5mL

## 2024-04-08 ENCOUNTER — OFFICE VISIT (OUTPATIENT)
Dept: FAMILY MEDICINE CLINIC | Facility: CLINIC | Age: 74
End: 2024-04-08
Payer: COMMERCIAL

## 2024-04-08 VITALS
HEART RATE: 77 BPM | BODY MASS INDEX: 29.49 KG/M2 | SYSTOLIC BLOOD PRESSURE: 120 MMHG | RESPIRATION RATE: 16 BRPM | DIASTOLIC BLOOD PRESSURE: 80 MMHG | WEIGHT: 177 LBS | TEMPERATURE: 98 F | HEIGHT: 65 IN | OXYGEN SATURATION: 97 %

## 2024-04-08 DIAGNOSIS — E11.65 TYPE 2 DIABETES MELLITUS WITH HYPERGLYCEMIA, WITHOUT LONG-TERM CURRENT USE OF INSULIN (HCC): Primary | ICD-10-CM

## 2024-04-08 LAB
GLUCOSE, POC: 145 MG/DL
HBA1C MFR BLD: 9.7 %

## 2024-04-08 PROCEDURE — 99213 OFFICE O/P EST LOW 20 MIN: CPT | Performed by: FAMILY MEDICINE

## 2024-04-08 PROCEDURE — 1123F ACP DISCUSS/DSCN MKR DOCD: CPT | Performed by: FAMILY MEDICINE

## 2024-04-08 PROCEDURE — 83036 HEMOGLOBIN GLYCOSYLATED A1C: CPT | Performed by: FAMILY MEDICINE

## 2024-04-08 PROCEDURE — 82947 ASSAY GLUCOSE BLOOD QUANT: CPT | Performed by: FAMILY MEDICINE

## 2024-04-08 NOTE — PROGRESS NOTES
Rasheed Dalal is a 73 y.o. male (: 1950) presenting to address:    Chief Complaint   Patient presents with    Diabetes       Vitals:    24 1530   BP: 120/80   Pulse: 77   Resp: 16   Temp: 98 °F (36.7 °C)   SpO2: 97%       \"Have you been to the ER, urgent care clinic since your last visit?  Hospitalized since your last visit?\"    NO    “Have you seen or consulted any other health care providers outside of [facility] since your last visit?”    NO

## 2024-04-08 NOTE — PATIENT INSTRUCTIONS
Current Status:  Now on prednisone 2.5 mg daily for another month or so  Glucose levels slowly decreasing but markedly elevated hemoglobin A1c now at 9.7% with a random glucose level of 145    Health Maintenance Recommendations:  Keep current with COVID-19 immunization guidelines Shingrix immunization series  Low-dose chest CT  Abdominal aortic aneurysm screening  Diabetic eye exam    Plan:  Continue metformin  Begin Jardiance 10 mg daily, call back with any difficulty filling the prescription  Use insulin only for elevated glucose levels  Return for follow-up in 1 month or sooner with any problems  Please schedule a fasting lab appointment followed by an annual physical exam appointment after 9/6/2024

## 2024-05-08 ENCOUNTER — OFFICE VISIT (OUTPATIENT)
Dept: FAMILY MEDICINE CLINIC | Facility: CLINIC | Age: 74
End: 2024-05-08
Payer: COMMERCIAL

## 2024-05-08 VITALS
WEIGHT: 178 LBS | SYSTOLIC BLOOD PRESSURE: 120 MMHG | OXYGEN SATURATION: 95 % | RESPIRATION RATE: 16 BRPM | BODY MASS INDEX: 29.66 KG/M2 | DIASTOLIC BLOOD PRESSURE: 70 MMHG | HEART RATE: 84 BPM | TEMPERATURE: 97.8 F | HEIGHT: 65 IN

## 2024-05-08 DIAGNOSIS — D69.3 THROMBOCYTOPENIA, IMMUNE (HCC): ICD-10-CM

## 2024-05-08 DIAGNOSIS — E55.9 VITAMIN D DEFICIENCY: ICD-10-CM

## 2024-05-08 DIAGNOSIS — E78.00 HYPERCHOLESTEROLEMIA: ICD-10-CM

## 2024-05-08 DIAGNOSIS — E11.65 TYPE 2 DIABETES MELLITUS WITH HYPERGLYCEMIA, WITHOUT LONG-TERM CURRENT USE OF INSULIN (HCC): ICD-10-CM

## 2024-05-08 DIAGNOSIS — Z86.010 HISTORY OF ADENOMATOUS POLYP OF COLON: ICD-10-CM

## 2024-05-08 DIAGNOSIS — Z79.4 TYPE 2 DIABETES MELLITUS WITH HYPERGLYCEMIA, WITH LONG-TERM CURRENT USE OF INSULIN (HCC): Primary | ICD-10-CM

## 2024-05-08 DIAGNOSIS — E11.65 TYPE 2 DIABETES MELLITUS WITH HYPERGLYCEMIA, WITH LONG-TERM CURRENT USE OF INSULIN (HCC): Primary | ICD-10-CM

## 2024-05-08 DIAGNOSIS — N52.9 ERECTILE DYSFUNCTION, UNSPECIFIED ERECTILE DYSFUNCTION TYPE: ICD-10-CM

## 2024-05-08 DIAGNOSIS — Z00.00 ROUTINE HEALTH MAINTENANCE: Primary | ICD-10-CM

## 2024-05-08 LAB — HBA1C MFR BLD: 7.9 %

## 2024-05-08 PROCEDURE — 99213 OFFICE O/P EST LOW 20 MIN: CPT | Performed by: FAMILY MEDICINE

## 2024-05-08 PROCEDURE — 1123F ACP DISCUSS/DSCN MKR DOCD: CPT | Performed by: FAMILY MEDICINE

## 2024-05-08 PROCEDURE — 83036 HEMOGLOBIN GLYCOSYLATED A1C: CPT | Performed by: FAMILY MEDICINE

## 2024-05-08 RX ORDER — SILDENAFIL 100 MG/1
TABLET, FILM COATED ORAL
Qty: 30 TABLET | Refills: 5 | Status: SHIPPED | OUTPATIENT
Start: 2024-05-08

## 2024-05-08 NOTE — PROGRESS NOTES
Rasheed Dalal is a 73 y.o. male (: 1950) presenting to address:    Chief Complaint   Patient presents with    Diabetes       Vitals:    24 1439   BP: 120/70   Pulse: 84   Resp: 16   Temp: 97.8 °F (36.6 °C)   SpO2: 95%       \"Have you been to the ER, urgent care clinic since your last visit?  Hospitalized since your last visit?\"    NO    “Have you seen or consulted any other health care providers outside of [facility] since your last visit?”    NO

## 2024-05-08 NOTE — PROGRESS NOTES
HISTORY OF PRESENT ILLNESS  Rasheed Dalal  is a 73 y.o. y.o. male    He returns for follow-up of inadequately controlled type 2 diabetes as result of need for treatment with prednisone because of autoimmune thrombocytopenia.  At his evaluation 1 month ago oral prednisone had been reduced to 2.5 mg daily with discontinuation anticipated in another month or so.  His hemoglobin A1c at that time was 9.7% with a random glucose of 145.  He was started on Jardiance 10 mg daily and advised to continue metformin.  Insulin use was limited to elevated glucose levels.    Today he reports that he has not had high enough glucose levels to require any administration of insulin.  His prednisone dose was recently decreased to 2.5 mg tablet every 3 days which she is to continue until his supply is exhausted.  He notes that he was told his platelet levels are still low but at his most recent follow-up this had improved some.  He has the impression that if he continues to have a problem with low platelet levels and alternative treatment will be tried instead of tapering back up on prednisone.            Mr#: [id number]      Past Medical History:   Diagnosis Date    CAD (coronary artery disease)     Constipation     Diabetes mellitus (HCC) 8/7/2015    Diarrhea     Gastric ulcer 12/16/2014    On EGD in 2014.      Hiatal hernia 12/16/2014    On EGD, 2014.  With Dr. Werner.    History of Helicobacter pylori infection 12/16/2014    On EGD in 2014, Dr. Werner.  S/p Tx.    Hyperlipidemia     Impotence of organic origin     Lactose intolerance     Low serum testosterone level     Nocturia     Other testicular hypofunction     Scrotal abscess 4/27/2018    Sinus congestion     Stented coronary artery 09/2013.    Umbilical hernia without obstruction and without gangrene 4/27/2018    Vitamin D deficiency        Past Surgical History:   Procedure Laterality Date    COLONOSCOPY  03/20/2017    Tubular adenoma, 5-year follow-up, Dr. Werner

## 2024-05-08 NOTE — PATIENT INSTRUCTIONS
Current Status:  Type 2 diabetes with control improving, hemoglobin A1c now 7.9% with decrease in prednisone dose to 2.5 mg every 3 days continued entirely after his current supply is exhausted    Health Maintenance Recommendations:  Shingrix immunization series to reduce the risk of shingles  Low-dose chest CT for lung cancer screening  Abdominal aortic aneurysm ultrasound screening  Diabetic eye exam    Plan:  Continue current medications but increase Jardiance to 25 mg daily  Discontinue metformin  Discontinue insulin  Please schedule fasting lab appointment followed by an annual physical exam appointment after 9/6/2024, return sooner with any problems

## 2024-05-19 DIAGNOSIS — E11.65 TYPE 2 DIABETES MELLITUS WITH HYPERGLYCEMIA, WITHOUT LONG-TERM CURRENT USE OF INSULIN (HCC): ICD-10-CM

## 2024-05-20 RX ORDER — INSULIN HUMAN 100 [IU]/ML
INJECTION, SUSPENSION SUBCUTANEOUS
Qty: 3 ML | Refills: 3 | OUTPATIENT
Start: 2024-05-20

## 2024-05-20 NOTE — TELEPHONE ENCOUNTER
Rasheed Dalal called for their medication refill.    Last Office visit:  5/8/2024    Last Filled: 3/18/2024; Qty 3 mL w/ 3 refills     Follow up visit:    Future Appointments   Date Time Provider Department Center   9/11/2024  9:20 AM [facility]   9/18/2024  3:30 PM Mor Ng MD [facility]

## 2024-08-10 DIAGNOSIS — E11.65 TYPE 2 DIABETES MELLITUS WITH HYPERGLYCEMIA, WITH LONG-TERM CURRENT USE OF INSULIN (HCC): ICD-10-CM

## 2024-08-10 DIAGNOSIS — Z79.4 TYPE 2 DIABETES MELLITUS WITH HYPERGLYCEMIA, WITH LONG-TERM CURRENT USE OF INSULIN (HCC): ICD-10-CM

## 2024-08-14 RX ORDER — EMPAGLIFLOZIN 25 MG/1
25 TABLET, FILM COATED ORAL DAILY
Qty: 90 TABLET | Refills: 1 | Status: SHIPPED | OUTPATIENT
Start: 2024-08-14

## 2024-08-14 NOTE — TELEPHONE ENCOUNTER
Last refilled 5/8/24 for 90 with 1 refill . Last ov 5/8/24   Future Appointments   Date Time Provider Department Center   9/11/2024  9:20 AM [facility] DE[facility]   9/18/2024  3:30 PM Mor Ng MD [facility] DE[facility]

## 2024-09-10 ENCOUNTER — HOSPITAL ENCOUNTER (OUTPATIENT)
Facility: HOSPITAL | Age: 74
Setting detail: SPECIMEN
Discharge: HOME | End: 2024-09-13
Payer: COMMERCIAL

## 2024-09-10 ENCOUNTER — OFFICE VISIT (OUTPATIENT)
Dept: FAMILY MEDICINE CLINIC | Facility: CLINIC | Age: 74
End: 2024-09-10
Payer: COMMERCIAL

## 2024-09-10 ENCOUNTER — LAB (OUTPATIENT)
Dept: FAMILY MEDICINE CLINIC | Facility: CLINIC | Age: 74
End: 2024-09-10

## 2024-09-10 VITALS
HEART RATE: 73 BPM | TEMPERATURE: 98.2 F | SYSTOLIC BLOOD PRESSURE: 130 MMHG | RESPIRATION RATE: 16 BRPM | OXYGEN SATURATION: 96 % | WEIGHT: 168 LBS | DIASTOLIC BLOOD PRESSURE: 82 MMHG | HEIGHT: 65 IN | BODY MASS INDEX: 27.99 KG/M2

## 2024-09-10 DIAGNOSIS — R39.9 UTI SYMPTOMS: Primary | ICD-10-CM

## 2024-09-10 DIAGNOSIS — Z79.4 TYPE 2 DIABETES MELLITUS WITH HYPERGLYCEMIA, WITH LONG-TERM CURRENT USE OF INSULIN (HCC): ICD-10-CM

## 2024-09-10 DIAGNOSIS — E11.65 TYPE 2 DIABETES MELLITUS WITH HYPERGLYCEMIA, WITH LONG-TERM CURRENT USE OF INSULIN (HCC): ICD-10-CM

## 2024-09-10 DIAGNOSIS — R39.9 UTI SYMPTOMS: ICD-10-CM

## 2024-09-10 DIAGNOSIS — R42 DIZZINESS: ICD-10-CM

## 2024-09-10 LAB
BILIRUBIN, URINE, POC: NEGATIVE
BLOOD URINE, POC: NORMAL
GLUCOSE URINE, POC: NORMAL
KETONES, URINE, POC: NORMAL
LEUKOCYTE ESTERASE, URINE, POC: NEGATIVE
NITRITE, URINE, POC: POSITIVE
PH, URINE, POC: 5.5 (ref 4.6–8)
PROTEIN,URINE, POC: NORMAL
SPECIFIC GRAVITY, URINE, POC: 1.02 (ref 1–1.03)
URINALYSIS CLARITY, POC: CLEAR
URINALYSIS COLOR, POC: NORMAL
UROBILINOGEN, POC: NORMAL

## 2024-09-10 PROCEDURE — 81003 URINALYSIS AUTO W/O SCOPE: CPT | Performed by: FAMILY MEDICINE

## 2024-09-10 PROCEDURE — 87186 SC STD MICRODIL/AGAR DIL: CPT

## 2024-09-10 PROCEDURE — 87088 URINE BACTERIA CULTURE: CPT

## 2024-09-10 PROCEDURE — 87086 URINE CULTURE/COLONY COUNT: CPT

## 2024-09-10 PROCEDURE — 1123F ACP DISCUSS/DSCN MKR DOCD: CPT | Performed by: FAMILY MEDICINE

## 2024-09-10 PROCEDURE — 99214 OFFICE O/P EST MOD 30 MIN: CPT | Performed by: FAMILY MEDICINE

## 2024-09-10 RX ORDER — CIPROFLOXACIN 500 MG/1
500 TABLET, FILM COATED ORAL 2 TIMES DAILY
Qty: 10 TABLET | Refills: 0 | Status: SHIPPED | OUTPATIENT
Start: 2024-09-10 | End: 2024-09-15

## 2024-09-10 SDOH — ECONOMIC STABILITY: FOOD INSECURITY: WITHIN THE PAST 12 MONTHS, YOU WORRIED THAT YOUR FOOD WOULD RUN OUT BEFORE YOU GOT MONEY TO BUY MORE.: NEVER TRUE

## 2024-09-10 SDOH — ECONOMIC STABILITY: INCOME INSECURITY: HOW HARD IS IT FOR YOU TO PAY FOR THE VERY BASICS LIKE FOOD, HOUSING, MEDICAL CARE, AND HEATING?: NOT HARD AT ALL

## 2024-09-10 SDOH — ECONOMIC STABILITY: FOOD INSECURITY: WITHIN THE PAST 12 MONTHS, THE FOOD YOU BOUGHT JUST DIDN'T LAST AND YOU DIDN'T HAVE MONEY TO GET MORE.: NEVER TRUE

## 2024-09-10 ASSESSMENT — ENCOUNTER SYMPTOMS
ABDOMINAL PAIN: 0
NAUSEA: 0

## 2024-09-11 ENCOUNTER — HOSPITAL ENCOUNTER (OUTPATIENT)
Facility: HOSPITAL | Age: 74
Setting detail: SPECIMEN
Discharge: HOME | End: 2024-09-14
Payer: COMMERCIAL

## 2024-09-11 DIAGNOSIS — E78.00 HYPERCHOLESTEROLEMIA: ICD-10-CM

## 2024-09-11 DIAGNOSIS — Z00.00 ROUTINE HEALTH MAINTENANCE: ICD-10-CM

## 2024-09-11 DIAGNOSIS — D69.3 THROMBOCYTOPENIA, IMMUNE (HCC): ICD-10-CM

## 2024-09-11 DIAGNOSIS — Z86.010 HISTORY OF ADENOMATOUS POLYP OF COLON: ICD-10-CM

## 2024-09-11 DIAGNOSIS — E55.9 VITAMIN D DEFICIENCY: ICD-10-CM

## 2024-09-11 DIAGNOSIS — E11.65 TYPE 2 DIABETES MELLITUS WITH HYPERGLYCEMIA, WITHOUT LONG-TERM CURRENT USE OF INSULIN (HCC): ICD-10-CM

## 2024-09-11 LAB
25(OH)D3 SERPL-MCNC: 30.4 NG/ML (ref 30–100)
ALBUMIN SERPL-MCNC: 3.9 G/DL (ref 3.4–5)
ALBUMIN/GLOB SERPL: 1.1 (ref 0.8–1.7)
ALP SERPL-CCNC: 90 U/L (ref 45–117)
ALT SERPL-CCNC: 21 U/L (ref 16–61)
ANION GAP SERPL CALC-SCNC: 15 MMOL/L (ref 3–18)
APPEARANCE UR: CLEAR
AST SERPL-CCNC: 15 U/L (ref 10–38)
BACTERIA URNS QL MICRO: ABNORMAL /HPF
BASOPHILS # BLD: 0.1 K/UL (ref 0–0.1)
BASOPHILS NFR BLD: 0 % (ref 0–2)
BILIRUB SERPL-MCNC: 1.6 MG/DL (ref 0.2–1)
BILIRUB UR QL: NEGATIVE
BUN SERPL-MCNC: 19 MG/DL (ref 7–18)
BUN/CREAT SERPL: 15 (ref 12–20)
CALCIUM SERPL-MCNC: 9.5 MG/DL (ref 8.5–10.1)
CHLORIDE SERPL-SCNC: 103 MMOL/L (ref 100–111)
CHOLEST SERPL-MCNC: 146 MG/DL
CO2 SERPL-SCNC: 18 MMOL/L (ref 21–32)
COLOR UR: YELLOW
CREAT SERPL-MCNC: 1.24 MG/DL (ref 0.6–1.3)
CREAT UR-MCNC: 140 MG/DL (ref 30–125)
DIFFERENTIAL METHOD BLD: ABNORMAL
EOSINOPHIL # BLD: 0 K/UL (ref 0–0.4)
EOSINOPHIL NFR BLD: 0 % (ref 0–5)
EPITH CASTS URNS QL MICRO: ABNORMAL /LPF (ref 0–5)
ERYTHROCYTE [DISTWIDTH] IN BLOOD BY AUTOMATED COUNT: 13.5 % (ref 11.6–14.5)
EST. AVERAGE GLUCOSE BLD GHB EST-MCNC: 240 MG/DL
GLOBULIN SER CALC-MCNC: 3.7 G/DL (ref 2–4)
GLUCOSE SERPL-MCNC: 160 MG/DL (ref 74–99)
GLUCOSE UR STRIP.AUTO-MCNC: >1000 MG/DL
HBA1C MFR BLD: 10 % (ref 4.2–5.6)
HCT VFR BLD AUTO: 48 % (ref 36–48)
HDLC SERPL-MCNC: 55 MG/DL (ref 40–60)
HDLC SERPL: 2.7 (ref 0–5)
HGB BLD-MCNC: 15.8 G/DL (ref 13–16)
HGB UR QL STRIP: ABNORMAL
IMM GRANULOCYTES # BLD AUTO: 0.1 K/UL (ref 0–0.04)
IMM GRANULOCYTES NFR BLD AUTO: 1 % (ref 0–0.5)
KETONES UR QL STRIP.AUTO: 40 MG/DL
LDLC SERPL CALC-MCNC: 66.2 MG/DL (ref 0–100)
LEUKOCYTE ESTERASE UR QL STRIP.AUTO: NEGATIVE
LIPID PANEL: NORMAL
LYMPHOCYTES # BLD: 2.5 K/UL (ref 0.9–3.6)
LYMPHOCYTES NFR BLD: 14 % (ref 21–52)
MCH RBC QN AUTO: 31.5 PG (ref 24–34)
MCHC RBC AUTO-ENTMCNC: 32.9 G/DL (ref 31–37)
MCV RBC AUTO: 95.6 FL (ref 78–100)
MICROALBUMIN UR-MCNC: 23.8 MG/DL (ref 0–3)
MICROALBUMIN/CREAT UR-RTO: 170 MG/G (ref 0–30)
MONOCYTES # BLD: 1.7 K/UL (ref 0.05–1.2)
MONOCYTES NFR BLD: 9 % (ref 3–10)
NEUTS SEG # BLD: 14.2 K/UL (ref 1.8–8)
NEUTS SEG NFR BLD: 76 % (ref 40–73)
NITRITE UR QL STRIP.AUTO: NEGATIVE
NRBC # BLD: 0 K/UL (ref 0–0.01)
NRBC BLD-RTO: 0 PER 100 WBC
PH UR STRIP: 5 (ref 5–8)
PLATELET # BLD AUTO: 53 K/UL (ref 135–420)
PMV BLD AUTO: 12.6 FL (ref 9.2–11.8)
POTASSIUM SERPL-SCNC: 3.7 MMOL/L (ref 3.5–5.5)
PROT SERPL-MCNC: 7.6 G/DL (ref 6.4–8.2)
PROT UR STRIP-MCNC: 30 MG/DL
PSA SERPL-MCNC: 23 NG/ML (ref 0–4)
RBC # BLD AUTO: 5.02 M/UL (ref 4.35–5.65)
RBC #/AREA URNS HPF: ABNORMAL /HPF (ref 0–5)
SODIUM SERPL-SCNC: 136 MMOL/L (ref 136–145)
SP GR UR REFRACTOMETRY: >1.03 (ref 1–1.03)
TRIGL SERPL-MCNC: 124 MG/DL
TSH SERPL DL<=0.05 MIU/L-ACNC: 0.37 UIU/ML (ref 0.36–3.74)
UROBILINOGEN UR QL STRIP.AUTO: 1 EU/DL (ref 0.2–1)
VLDLC SERPL CALC-MCNC: 24.8 MG/DL
WBC # BLD AUTO: 18.6 K/UL (ref 4.6–13.2)
WBC URNS QL MICRO: ABNORMAL /HPF (ref 0–4)

## 2024-09-11 PROCEDURE — 83036 HEMOGLOBIN GLYCOSYLATED A1C: CPT

## 2024-09-11 PROCEDURE — 85025 COMPLETE CBC W/AUTO DIFF WBC: CPT

## 2024-09-11 PROCEDURE — 80053 COMPREHEN METABOLIC PANEL: CPT

## 2024-09-11 PROCEDURE — 36415 COLL VENOUS BLD VENIPUNCTURE: CPT

## 2024-09-11 PROCEDURE — 84443 ASSAY THYROID STIM HORMONE: CPT

## 2024-09-11 PROCEDURE — 82570 ASSAY OF URINE CREATININE: CPT

## 2024-09-11 PROCEDURE — G0103 PSA SCREENING: HCPCS

## 2024-09-11 PROCEDURE — 80061 LIPID PANEL: CPT

## 2024-09-11 PROCEDURE — 81001 URINALYSIS AUTO W/SCOPE: CPT

## 2024-09-11 PROCEDURE — 82306 VITAMIN D 25 HYDROXY: CPT

## 2024-09-11 PROCEDURE — 82043 UR ALBUMIN QUANTITATIVE: CPT

## 2024-09-13 LAB
BACTERIA SPEC CULT: ABNORMAL
BACTERIA SPEC CULT: ABNORMAL
CC UR VC: ABNORMAL
CC UR VC: ABNORMAL
SERVICE CMNT-IMP: ABNORMAL
SERVICE CMNT-IMP: ABNORMAL

## 2024-09-18 ASSESSMENT — ENCOUNTER SYMPTOMS
ABDOMINAL PAIN: 0
ANAL BLEEDING: 0
SORE THROAT: 0
VOMITING: 0
SHORTNESS OF BREATH: 0
WHEEZING: 0
CHEST TIGHTNESS: 0
COUGH: 0
NAUSEA: 0
CONSTIPATION: 0
BLOOD IN STOOL: 0
DIARRHEA: 0

## 2024-09-19 ENCOUNTER — OFFICE VISIT (OUTPATIENT)
Dept: FAMILY MEDICINE CLINIC | Facility: CLINIC | Age: 74
End: 2024-09-19

## 2024-09-19 ENCOUNTER — HOSPITAL ENCOUNTER (OUTPATIENT)
Facility: HOSPITAL | Age: 74
Setting detail: SPECIMEN
Discharge: HOME | End: 2024-09-22
Payer: COMMERCIAL

## 2024-09-19 VITALS
WEIGHT: 170 LBS | OXYGEN SATURATION: 96 % | HEART RATE: 63 BPM | TEMPERATURE: 97.9 F | BODY MASS INDEX: 28.32 KG/M2 | HEIGHT: 65 IN | DIASTOLIC BLOOD PRESSURE: 70 MMHG | SYSTOLIC BLOOD PRESSURE: 118 MMHG | RESPIRATION RATE: 16 BRPM

## 2024-09-19 DIAGNOSIS — N39.0 E. COLI URINARY TRACT INFECTION: ICD-10-CM

## 2024-09-19 DIAGNOSIS — E11.65 TYPE 2 DIABETES MELLITUS WITH HYPERGLYCEMIA, WITHOUT LONG-TERM CURRENT USE OF INSULIN (HCC): Primary | ICD-10-CM

## 2024-09-19 DIAGNOSIS — B96.20 E. COLI URINARY TRACT INFECTION: ICD-10-CM

## 2024-09-19 DIAGNOSIS — R97.20 ELEVATED PSA: ICD-10-CM

## 2024-09-19 DIAGNOSIS — Z86.010 HISTORY OF ADENOMATOUS POLYP OF COLON: ICD-10-CM

## 2024-09-19 DIAGNOSIS — D69.3 AUTOIMMUNE THROMBOCYTOPENIA (HCC): ICD-10-CM

## 2024-09-19 DIAGNOSIS — E55.9 VITAMIN D DEFICIENCY: ICD-10-CM

## 2024-09-19 LAB
BILIRUBIN, URINE, POC: NEGATIVE
BLOOD URINE, POC: NEGATIVE
GLUCOSE URINE, POC: NEGATIVE
GLUCOSE, POC: 264 MG/DL
KETONES, URINE, POC: NEGATIVE
LEUKOCYTE ESTERASE, URINE, POC: NEGATIVE
NITRITE, URINE, POC: NEGATIVE
PH, URINE, POC: 5.5 (ref 4.6–8)
PROTEIN,URINE, POC: NEGATIVE
SPECIFIC GRAVITY, URINE, POC: 1.02 (ref 1–1.03)
URINALYSIS CLARITY, POC: CLEAR
URINALYSIS COLOR, POC: YELLOW
UROBILINOGEN, POC: NORMAL

## 2024-09-19 PROCEDURE — 87086 URINE CULTURE/COLONY COUNT: CPT

## 2024-09-19 RX ORDER — PIOGLITAZONEHYDROCHLORIDE 15 MG/1
TABLET ORAL
Qty: 30 TABLET | Refills: 1 | Status: SHIPPED | OUTPATIENT
Start: 2024-09-19

## 2024-09-20 LAB
BACTERIA SPEC CULT: NORMAL
SERVICE CMNT-IMP: NORMAL

## 2024-09-27 DIAGNOSIS — E11.65 TYPE 2 DIABETES MELLITUS WITH HYPERGLYCEMIA, WITHOUT LONG-TERM CURRENT USE OF INSULIN (HCC): Primary | ICD-10-CM

## 2024-10-18 ENCOUNTER — OFFICE VISIT (OUTPATIENT)
Dept: FAMILY MEDICINE CLINIC | Facility: CLINIC | Age: 74
End: 2024-10-18

## 2024-10-18 VITALS
DIASTOLIC BLOOD PRESSURE: 82 MMHG | TEMPERATURE: 97.8 F | SYSTOLIC BLOOD PRESSURE: 120 MMHG | RESPIRATION RATE: 16 BRPM | HEIGHT: 65 IN | HEART RATE: 67 BPM | BODY MASS INDEX: 28.82 KG/M2 | OXYGEN SATURATION: 97 % | WEIGHT: 173 LBS

## 2024-10-18 DIAGNOSIS — D69.3 AUTOIMMUNE THROMBOCYTOPENIA (HCC): ICD-10-CM

## 2024-10-18 DIAGNOSIS — R97.20 ELEVATED PSA: ICD-10-CM

## 2024-10-18 DIAGNOSIS — E55.9 VITAMIN D DEFICIENCY: ICD-10-CM

## 2024-10-18 DIAGNOSIS — Z95.5 STENTED CORONARY ARTERY: ICD-10-CM

## 2024-10-18 DIAGNOSIS — Z13.6 SCREENING FOR AAA (AORTIC ABDOMINAL ANEURYSM): ICD-10-CM

## 2024-10-18 DIAGNOSIS — Z87.891 PERSONAL HISTORY OF SMOKING: ICD-10-CM

## 2024-10-18 DIAGNOSIS — Z86.0101 HISTORY OF ADENOMATOUS POLYP OF COLON: ICD-10-CM

## 2024-10-18 DIAGNOSIS — Z12.2 SCREENING FOR MALIGNANT NEOPLASM OF RESPIRATORY ORGAN: ICD-10-CM

## 2024-10-18 DIAGNOSIS — Z87.891 PERSONAL HISTORY OF TOBACCO USE: ICD-10-CM

## 2024-10-18 DIAGNOSIS — E78.00 HYPERCHOLESTEROLEMIA: ICD-10-CM

## 2024-10-18 DIAGNOSIS — E11.65 TYPE 2 DIABETES MELLITUS WITH HYPERGLYCEMIA, WITHOUT LONG-TERM CURRENT USE OF INSULIN (HCC): Primary | ICD-10-CM

## 2024-10-18 LAB
GLUCOSE, POC: 118 MG/DL
HBA1C MFR BLD: 9 %

## 2024-10-18 RX ORDER — PIOGLITAZONEHYDROCHLORIDE 15 MG/1
TABLET ORAL
Qty: 30 TABLET | Refills: 1 | Status: SHIPPED | OUTPATIENT
Start: 2024-10-18

## 2024-10-18 ASSESSMENT — ENCOUNTER SYMPTOMS
CHEST TIGHTNESS: 0
SHORTNESS OF BREATH: 0

## 2024-10-18 NOTE — PATIENT INSTRUCTIONS
not have caused a problem if they had not been found through screening. But because doctors can't tell which ones will turn out to be harmless, most will be treated. This means that you may get treatment--including surgery, radiation, or chemotherapy--that you don't need.  There is a risk of damage to cells or tissue from being exposed to radiation, including the small amounts used in CTs, X-rays, and other medical tests. Over time, exposure to radiation may cause cancer and other health problems. But in most cases, the risk of getting cancer from being exposed to small amounts of radiation is low. It's not a reason to avoid these tests for most people.  What are the benefits of screening?  Your scan may be normal (negative).  For some people who are at higher risk, screening lowers the chance of dying of lung cancer. How much and how long you smoked helps to determine your risk level. Screening can find some cancers early, when treatment may be more likely to work.  What happens after screening?  The results of your CT scan will be sent to your doctor. Someone from your care team will explain the results of your scan and answer any questions you may have. If you need any follow-up, he or she will help you understand what to do next.  After a lung cancer screening, you can go back to your usual activities right away.  A lung cancer screening test can't tell if you have lung cancer. If your results are positive, your doctor can't tell whether an abnormal finding is a harmless nodule, cancer, or something else without doing more tests.  What can you do to help prevent lung cancer?  Some lung cancers can't be prevented. But if you smoke, quitting smoking is the best step you can take to prevent lung cancer. If you want to quit, your doctor can recommend medicines or other ways to help.  Follow-up care is a key part of your treatment and safety. Be sure to make and go to all appointments, and call your doctor if you are

## 2024-10-18 NOTE — PROGRESS NOTES
Rasheed Dalal is a 74 y.o. male (: 1950) presenting to address:    Chief Complaint   Patient presents with    Diabetes     Would like to go to single shot weekly instead of 2 .        Vitals:    10/18/24 1304   BP: 120/82   Pulse: 67   Resp: 16   Temp: 97.8 °F (36.6 °C)   SpO2: 97%       \"Have you been to the ER, urgent care clinic since your last visit?  Hospitalized since your last visit?\"    NO    “Have you seen or consulted any other health care providers outside of [facility] since your last visit?”    NO           
Negative for confusion.        /82   Pulse 67   Temp 97.8 °F (36.6 °C) (Temporal)   Resp 16   Ht 1.651 m (5' 5\")   Wt 78.5 kg (173 lb)   SpO2 97%   BMI 28.79 kg/m²     Physical Exam  Vitals and nursing note reviewed.   Constitutional:       General: He is not in acute distress.     Appearance: Normal appearance. He is not ill-appearing.   HENT:      Head: Normocephalic.   Eyes:      Extraocular Movements: Extraocular movements intact.   Cardiovascular:      Rate and Rhythm: Normal rate.   Pulmonary:      Effort: Pulmonary effort is normal.   Neurological:      Mental Status: He is alert.   Psychiatric:         Mood and Affect: Mood normal.         Behavior: Behavior normal.     Diabetic foot exam:   Left Foot:   Visual Exam: normal   Pulse DP: 2+ (normal)   Filament test: normal sensation     Right Foot:   Visual Exam: normal   Pulse DP: 2+ (normal)   Filament test: normal sensation        Results for orders placed or performed in visit on 10/18/24   AMB POC HEMOGLOBIN A1C   Result Value Ref Range    Hemoglobin A1C, POC 9.0 %   AMB POC GLUCOSE, QUANTITATIVE, BLOOD   Result Value Ref Range    Glucose,  MG/DL         ASSESSMENT and PLAN    1. Type 2 diabetes mellitus with hyperglycemia, without long-term current use of insulin (HCC)  -     AMB POC HEMOGLOBIN A1C  -     AMB POC GLUCOSE, QUANTITATIVE, BLOOD  -     pioglitazone (ACTOS) 15 MG tablet; Take 1 tablet every morning, Disp-30 tablet, R-1Normal  -     Dulaglutide 3 MG/0.5ML SOPN; Inject 3 mg subcutaneously every 7 days, Disp-4 Adjustable Dose Pre-filled Pen Syringe, R-1Has been using 2 injections of 0.75 mg weekly because of previous supply issues, now ready to increase to 3.0 mg weeklyNormal  2. Elevated PSA  3. Hypercholesterolemia  4. Stented coronary artery  5. Autoimmune thrombocytopenia (HCC)  -     CBC with Auto Differential; Future  6. Vitamin D deficiency  7. History of adenomatous polyp of colon    Current Status:  Type 2 diabetes

## 2024-11-01 RX ORDER — SIMVASTATIN 10 MG
10 TABLET ORAL NIGHTLY
Qty: 100 TABLET | Refills: 3 | Status: SHIPPED | OUTPATIENT
Start: 2024-11-01

## 2024-11-01 NOTE — TELEPHONE ENCOUNTER
Last refilled 10/24/23 for 100 with 3 refills. Last ov 10/18/24   Future Appointments   Date Time Provider Department Center   11/11/2024  7:50 AM [facility]   11/18/2024  1:30 PM [facility]

## 2024-11-11 ENCOUNTER — HOSPITAL ENCOUNTER (OUTPATIENT)
Facility: HOSPITAL | Age: 74
Setting detail: SPECIMEN
Discharge: HOME | End: 2024-11-14
Payer: COMMERCIAL

## 2024-11-11 DIAGNOSIS — D69.3 AUTOIMMUNE THROMBOCYTOPENIA (HCC): ICD-10-CM

## 2024-11-11 DIAGNOSIS — E11.65 TYPE 2 DIABETES MELLITUS WITH HYPERGLYCEMIA, WITHOUT LONG-TERM CURRENT USE OF INSULIN (HCC): ICD-10-CM

## 2024-11-11 DIAGNOSIS — R97.20 ELEVATED PSA: ICD-10-CM

## 2024-11-11 LAB
ANION GAP SERPL CALC-SCNC: 7 MMOL/L (ref 3–18)
BASOPHILS # BLD: 0 K/UL (ref 0–0.1)
BASOPHILS NFR BLD: 0 % (ref 0–2)
BUN SERPL-MCNC: 18 MG/DL (ref 7–18)
BUN/CREAT SERPL: 16 (ref 12–20)
CALCIUM SERPL-MCNC: 9.6 MG/DL (ref 8.5–10.1)
CHLORIDE SERPL-SCNC: 107 MMOL/L (ref 100–111)
CO2 SERPL-SCNC: 25 MMOL/L (ref 21–32)
CREAT SERPL-MCNC: 1.11 MG/DL (ref 0.6–1.3)
DIFFERENTIAL METHOD BLD: ABNORMAL
EOSINOPHIL # BLD: 0.1 K/UL (ref 0–0.4)
EOSINOPHIL NFR BLD: 2 % (ref 0–5)
ERYTHROCYTE [DISTWIDTH] IN BLOOD BY AUTOMATED COUNT: 13.6 % (ref 11.6–14.5)
GLUCOSE SERPL-MCNC: 119 MG/DL (ref 74–99)
HCT VFR BLD AUTO: 44.3 % (ref 36–48)
HGB BLD-MCNC: 14.4 G/DL (ref 13–16)
IMM GRANULOCYTES # BLD AUTO: 0 K/UL (ref 0–0.04)
IMM GRANULOCYTES NFR BLD AUTO: 0 % (ref 0–0.5)
LYMPHOCYTES # BLD: 2.4 K/UL (ref 0.9–3.6)
LYMPHOCYTES NFR BLD: 44 % (ref 21–52)
MCH RBC QN AUTO: 31.4 PG (ref 24–34)
MCHC RBC AUTO-ENTMCNC: 32.5 G/DL (ref 31–37)
MCV RBC AUTO: 96.5 FL (ref 78–100)
MONOCYTES # BLD: 0.4 K/UL (ref 0.05–1.2)
MONOCYTES NFR BLD: 8 % (ref 3–10)
NEUTS SEG # BLD: 2.5 K/UL (ref 1.8–8)
NEUTS SEG NFR BLD: 46 % (ref 40–73)
NRBC # BLD: 0 K/UL (ref 0–0.01)
NRBC BLD-RTO: 0 PER 100 WBC
PLATELET # BLD AUTO: 63 K/UL (ref 135–420)
PLATELET COMMENT: ABNORMAL
PMV BLD AUTO: 12.3 FL (ref 9.2–11.8)
POTASSIUM SERPL-SCNC: 3.8 MMOL/L (ref 3.5–5.5)
PSA SERPL-MCNC: 3.9 NG/ML (ref 0–4)
RBC # BLD AUTO: 4.59 M/UL (ref 4.35–5.65)
RBC MORPH BLD: ABNORMAL
SODIUM SERPL-SCNC: 139 MMOL/L (ref 136–145)
WBC # BLD AUTO: 5.4 K/UL (ref 4.6–13.2)

## 2024-11-11 PROCEDURE — 85025 COMPLETE CBC W/AUTO DIFF WBC: CPT

## 2024-11-11 PROCEDURE — 36415 COLL VENOUS BLD VENIPUNCTURE: CPT

## 2024-11-11 PROCEDURE — 80048 BASIC METABOLIC PNL TOTAL CA: CPT

## 2024-11-11 PROCEDURE — 84153 ASSAY OF PSA TOTAL: CPT

## 2024-11-16 NOTE — PROGRESS NOTES
HISTORY OF PRESENT ILLNESS  Rasheed Dalal  is a 74 y.o. y.o. male    He returns for follow-up of type 2 diabetes recently and poor control with a hemoglobin A1c of 9.0% 1 month ago as well as an unexplained dramatic increase of PSA level at 23 obtained with routine lab for annual screening.  Other studies from 9/11/2024 included satisfactory lipid levels and a low normal vitamin D level.  He also has a history of autoimmune thrombocytopenia followed by hematology oncology.  At the time of his evaluation 1 month ago he was asked to increase Trulicity to 3 mg to be injected subcutaneously every 7 days.  Jardiance had previously been discontinued because of a urinary tract infection.  A low-dose chest CT scan to screen for lung cancer and then abdominal aortic aneurysm screening ultrasound were ordered.  Today he reports some bloating which she attributes to Trulicity but he does not find it sufficiently bothersome to warrant discontinuing the medication.  He is most concerned about the recent decrease in his platelet count.  He advised his hematology oncology consultant of the 63,000 results obtained here and subsequent lab results obtained there were lower at around 32,000.  He is scheduled for follow-up with an interventional IV infusion on 11/22/2024.  He has not noted any bleeding except occasionally he spits out some postnasal drainage that is blood-tinged.        Mr#: [id number]      Past Medical History:   Diagnosis Date    CAD (coronary artery disease)     Constipation     Diabetes mellitus (HCC) 8/7/2015    Diarrhea     Gastric ulcer 12/16/2014    On EGD in 2014.      Hiatal hernia 12/16/2014    On EGD, 2014.  With Dr. Werner.    History of Helicobacter pylori infection 12/16/2014    On EGD in 2014, Dr. Werner.  S/p Tx.    Hyperlipidemia     Impotence of organic origin     Lactose intolerance     Low serum testosterone level     Nocturia     Other testicular hypofunction     Scrotal abscess 4/27/2018

## 2024-11-18 ENCOUNTER — OFFICE VISIT (OUTPATIENT)
Dept: FAMILY MEDICINE CLINIC | Facility: CLINIC | Age: 74
End: 2024-11-18
Payer: COMMERCIAL

## 2024-11-18 VITALS
DIASTOLIC BLOOD PRESSURE: 80 MMHG | OXYGEN SATURATION: 95 % | SYSTOLIC BLOOD PRESSURE: 120 MMHG | WEIGHT: 180 LBS | TEMPERATURE: 98.1 F | BODY MASS INDEX: 29.99 KG/M2 | HEIGHT: 65 IN | RESPIRATION RATE: 16 BRPM | HEART RATE: 71 BPM

## 2024-11-18 DIAGNOSIS — E11.65 TYPE 2 DIABETES MELLITUS WITH HYPERGLYCEMIA, WITHOUT LONG-TERM CURRENT USE OF INSULIN (HCC): Primary | ICD-10-CM

## 2024-11-18 DIAGNOSIS — R97.20 ELEVATED PSA: ICD-10-CM

## 2024-11-18 DIAGNOSIS — D69.3 AUTOIMMUNE THROMBOCYTOPENIA (HCC): ICD-10-CM

## 2024-11-18 LAB — HBA1C MFR BLD: 7.4 %

## 2024-11-18 PROCEDURE — 83036 HEMOGLOBIN GLYCOSYLATED A1C: CPT | Performed by: FAMILY MEDICINE

## 2024-11-18 PROCEDURE — 1123F ACP DISCUSS/DSCN MKR DOCD: CPT | Performed by: FAMILY MEDICINE

## 2024-11-18 PROCEDURE — 99214 OFFICE O/P EST MOD 30 MIN: CPT | Performed by: FAMILY MEDICINE

## 2024-11-18 PROCEDURE — 3046F HEMOGLOBIN A1C LEVEL >9.0%: CPT | Performed by: FAMILY MEDICINE

## 2024-11-18 RX ORDER — PIOGLITAZONE 15 MG/1
TABLET ORAL
Qty: 30 TABLET | Refills: 2 | Status: SHIPPED | OUTPATIENT
Start: 2024-11-18

## 2024-11-18 ASSESSMENT — ENCOUNTER SYMPTOMS: BACK PAIN: 1

## 2024-11-18 NOTE — PATIENT INSTRUCTIONS
Current Status:  Type 2 diabetes with control much improved, hemoglobin A1c 7.4%  Marked improvement in PSA compared with 2 months ago however the most recent PSA of 3.9 represents a dramatic increase in PSA velocity compared with the level from 9/6/2023 which was 0.5.  That level had been stable since July 2020.    Health Maintenance Recommendations:  Shingrix immunization series  Diabetic eye exam  LDCT and abdominal aortic aneurysm screening test ordered last month    Plan:  Urology referral  Continue current medications  Avoid dietary starch and sugar and as much as possible follow program of regular aerobic exercise.  Hematology oncology follow-up Friday as scheduled  Return for follow-up in one month  Please always arrive at least 15 minutes before your scheduled appointment time.

## 2024-11-18 NOTE — PROGRESS NOTES
Rasheed Dalal is a 74 y.o. male (: 1950) presenting to address:    Chief Complaint   Patient presents with    Diabetes    Cold Symptoms     Low platelets at hematology follow up. Thinks he had some kind of an infection . Neck soreness.        Vitals:    24 1317   BP: 120/80   Pulse: 71   Resp: 16   Temp: 98.1 °F (36.7 °C)   SpO2: 95%       \"Have you been to the ER, urgent care clinic since your last visit?  Hospitalized since your last visit?\"    NO    “Have you seen or consulted any other health care providers outside of [facility] since your last visit?”    Yes hematology

## 2024-12-13 DIAGNOSIS — E11.65 TYPE 2 DIABETES MELLITUS WITH HYPERGLYCEMIA, WITHOUT LONG-TERM CURRENT USE OF INSULIN (HCC): ICD-10-CM

## 2024-12-16 NOTE — TELEPHONE ENCOUNTER
Last refilled 10/18/24 for 4 with 1 refill. Last ov 11/18/24   Future Appointments   Date Time Provider Department Center   12/18/2024  3:00 PM [facility]

## 2024-12-18 ENCOUNTER — OFFICE VISIT (OUTPATIENT)
Dept: FAMILY MEDICINE CLINIC | Facility: CLINIC | Age: 74
End: 2024-12-18
Payer: COMMERCIAL

## 2024-12-18 VITALS
DIASTOLIC BLOOD PRESSURE: 80 MMHG | TEMPERATURE: 98.2 F | BODY MASS INDEX: 29.49 KG/M2 | WEIGHT: 177 LBS | HEIGHT: 65 IN | SYSTOLIC BLOOD PRESSURE: 126 MMHG | RESPIRATION RATE: 16 BRPM | OXYGEN SATURATION: 95 % | HEART RATE: 75 BPM

## 2024-12-18 DIAGNOSIS — I25.10 CORONARY ARTERY DISEASE INVOLVING NATIVE CORONARY ARTERY OF NATIVE HEART WITHOUT ANGINA PECTORIS: ICD-10-CM

## 2024-12-18 DIAGNOSIS — E78.00 HYPERCHOLESTEROLEMIA: ICD-10-CM

## 2024-12-18 DIAGNOSIS — E11.9 TYPE 2 DIABETES MELLITUS WITHOUT COMPLICATION, WITHOUT LONG-TERM CURRENT USE OF INSULIN (HCC): Primary | ICD-10-CM

## 2024-12-18 DIAGNOSIS — Z86.0101 HISTORY OF ADENOMATOUS POLYP OF COLON: ICD-10-CM

## 2024-12-18 DIAGNOSIS — R97.20 ELEVATED PSA: ICD-10-CM

## 2024-12-18 DIAGNOSIS — D69.3 AUTOIMMUNE THROMBOCYTOPENIA (HCC): ICD-10-CM

## 2024-12-18 DIAGNOSIS — Z95.5 STENTED CORONARY ARTERY: ICD-10-CM

## 2024-12-18 PROCEDURE — 3046F HEMOGLOBIN A1C LEVEL >9.0%: CPT | Performed by: FAMILY MEDICINE

## 2024-12-18 PROCEDURE — 99213 OFFICE O/P EST LOW 20 MIN: CPT | Performed by: FAMILY MEDICINE

## 2024-12-18 PROCEDURE — 1123F ACP DISCUSS/DSCN MKR DOCD: CPT | Performed by: FAMILY MEDICINE

## 2024-12-18 RX ORDER — DULAGLUTIDE 3 MG/.5ML
INJECTION, SOLUTION SUBCUTANEOUS
Qty: 2 ML | OUTPATIENT
Start: 2024-12-18

## 2024-12-18 ASSESSMENT — ENCOUNTER SYMPTOMS
CHEST TIGHTNESS: 0
SHORTNESS OF BREATH: 0

## 2024-12-18 NOTE — PROGRESS NOTES
Rasheed Dalal is a 74 y.o. male (: 1950) presenting to address:    Chief Complaint   Patient presents with    Diabetes       Vitals:    24 1437   BP: 126/80   Pulse: 75   Resp: 16   Temp: 98.2 °F (36.8 °C)   SpO2: 95%       \"Have you been to the ER, urgent care clinic since your last visit?  Hospitalized since your last visit?\"    NO    “Have you seen or consulted any other health care providers outside of [facility] since your last visit?”    NO           
TAKE 1 TABLET BY MOUTH AT BEDTIME, Disp: 100 tablet, Rfl: 3    Dulaglutide 3 MG/0.5ML SOPN, Inject 3 mg subcutaneously every 7 days, Disp: 4 Adjustable Dose Pre-filled Pen Syringe, Rfl: 1    sildenafil (VIAGRA) 100 MG tablet, Take 1 tablet 1 hour before intercourse, do not exceed 1 dose in 24-hour, Disp: 30 tablet, Rfl: 5    Continuous Blood Gluc Sensor (FREESTYLE SUNITA 3 SENSOR) MISC, Use for continuous glucose monitoring E11.65, Disp: 1 each, Rfl: 3    ferrous sulfate (IRON 325) 325 (65 Fe) MG tablet, Take 1 tablet by mouth daily (with breakfast), Disp: , Rfl:     omeprazole (PRILOSEC) 20 MG delayed release capsule, Take 1 capsule by mouth daily, Disp: 90 capsule, Rfl: 1    Cholecalciferol 50 MCG (2000 UT) TABS, Take by mouth daily, Disp: , Rfl:     aspirin 81 MG EC tablet, Take 1 tablet by mouth daily, Disp: , Rfl:       Review of Systems   Constitutional:  Negative for appetite change, fever and unexpected weight change.   Eyes:  Negative for visual disturbance.   Respiratory:  Negative for chest tightness and shortness of breath.    Cardiovascular:  Negative for chest pain, palpitations and leg swelling.   Endocrine: Negative for polydipsia and polyuria.   Neurological:  Negative for dizziness, speech difficulty, light-headedness, numbness and headaches.   Psychiatric/Behavioral:  Negative for confusion.        /80   Pulse 75   Temp 98.2 °F (36.8 °C) (Temporal)   Resp 16   Ht 1.651 m (5' 5\")   Wt 80.3 kg (177 lb)   SpO2 95%   BMI 29.45 kg/m²     Physical Exam  Vitals and nursing note reviewed.   Constitutional:       General: He is not in acute distress.     Appearance: Normal appearance. He is not ill-appearing.   HENT:      Head: Normocephalic.   Eyes:      Extraocular Movements: Extraocular movements intact.   Cardiovascular:      Rate and Rhythm: Normal rate.   Pulmonary:      Effort: Pulmonary effort is normal.   Neurological:      Mental Status: He is alert.   Psychiatric:         Mood and

## 2024-12-18 NOTE — PATIENT INSTRUCTIONS
Current Status:  Diabetic control continues to improve  Status of thrombocytopenia-  No symptoms of cardiac ischemia  Satisfactory lipid levels as of annual check in September  Elevated PSA-awaiting urology consultation  History of colonic tubular adenomas with colonoscopy surveillance up-to-date    Health Maintenance Recommendations:  Colonoscopy due July 2025    Plan:  Continue current medications with Trulicity at the 3 mg weekly dose  Return for follow-up with an in office hemoglobin A1c in 3 months or sooner with any problems  Hematology oncology follow-up as recommended by the consultant (appointment this Friday)  Urology appointment-referral has been generated  Please always arrive at least 15 minutes before your scheduled appointment time.

## 2025-03-18 SDOH — ECONOMIC STABILITY: FOOD INSECURITY: WITHIN THE PAST 12 MONTHS, THE FOOD YOU BOUGHT JUST DIDN'T LAST AND YOU DIDN'T HAVE MONEY TO GET MORE.: PATIENT DECLINED

## 2025-03-18 SDOH — ECONOMIC STABILITY: FOOD INSECURITY: WITHIN THE PAST 12 MONTHS, YOU WORRIED THAT YOUR FOOD WOULD RUN OUT BEFORE YOU GOT MONEY TO BUY MORE.: PATIENT DECLINED

## 2025-03-18 SDOH — ECONOMIC STABILITY: TRANSPORTATION INSECURITY
IN THE PAST 12 MONTHS, HAS LACK OF TRANSPORTATION KEPT YOU FROM MEETINGS, WORK, OR FROM GETTING THINGS NEEDED FOR DAILY LIVING?: NO

## 2025-03-18 SDOH — ECONOMIC STABILITY: INCOME INSECURITY: IN THE LAST 12 MONTHS, WAS THERE A TIME WHEN YOU WERE NOT ABLE TO PAY THE MORTGAGE OR RENT ON TIME?: PATIENT DECLINED

## 2025-03-18 SDOH — ECONOMIC STABILITY: TRANSPORTATION INSECURITY
IN THE PAST 12 MONTHS, HAS THE LACK OF TRANSPORTATION KEPT YOU FROM MEDICAL APPOINTMENTS OR FROM GETTING MEDICATIONS?: NO

## 2025-03-19 ENCOUNTER — OFFICE VISIT (OUTPATIENT)
Dept: FAMILY MEDICINE CLINIC | Facility: CLINIC | Age: 75
End: 2025-03-19
Payer: COMMERCIAL

## 2025-03-19 VITALS
RESPIRATION RATE: 16 BRPM | BODY MASS INDEX: 30.32 KG/M2 | WEIGHT: 182 LBS | DIASTOLIC BLOOD PRESSURE: 80 MMHG | HEIGHT: 65 IN | OXYGEN SATURATION: 95 % | TEMPERATURE: 98.2 F | HEART RATE: 74 BPM | SYSTOLIC BLOOD PRESSURE: 118 MMHG

## 2025-03-19 DIAGNOSIS — Z79.4 TYPE 2 DIABETES MELLITUS WITH HYPERGLYCEMIA, WITH LONG-TERM CURRENT USE OF INSULIN (HCC): Primary | ICD-10-CM

## 2025-03-19 DIAGNOSIS — D69.3 AUTOIMMUNE THROMBOCYTOPENIA (HCC): ICD-10-CM

## 2025-03-19 DIAGNOSIS — E11.65 TYPE 2 DIABETES MELLITUS WITH HYPERGLYCEMIA, WITH LONG-TERM CURRENT USE OF INSULIN (HCC): Primary | ICD-10-CM

## 2025-03-19 LAB — HBA1C MFR BLD: 6.7 %

## 2025-03-19 PROCEDURE — 99213 OFFICE O/P EST LOW 20 MIN: CPT | Performed by: FAMILY MEDICINE

## 2025-03-19 PROCEDURE — 3044F HG A1C LEVEL LT 7.0%: CPT | Performed by: FAMILY MEDICINE

## 2025-03-19 PROCEDURE — 1123F ACP DISCUSS/DSCN MKR DOCD: CPT | Performed by: FAMILY MEDICINE

## 2025-03-19 PROCEDURE — 83036 HEMOGLOBIN GLYCOSYLATED A1C: CPT | Performed by: FAMILY MEDICINE

## 2025-03-19 RX ORDER — PIOGLITAZONE 15 MG/1
TABLET ORAL
Qty: 30 TABLET | Refills: 2 | Status: CANCELLED | OUTPATIENT
Start: 2025-03-19

## 2025-03-19 ASSESSMENT — PATIENT HEALTH QUESTIONNAIRE - PHQ9
1. LITTLE INTEREST OR PLEASURE IN DOING THINGS: NOT AT ALL
SUM OF ALL RESPONSES TO PHQ QUESTIONS 1-9: 0
SUM OF ALL RESPONSES TO PHQ QUESTIONS 1-9: 0
2. FEELING DOWN, DEPRESSED OR HOPELESS: NOT AT ALL
SUM OF ALL RESPONSES TO PHQ QUESTIONS 1-9: 0
SUM OF ALL RESPONSES TO PHQ QUESTIONS 1-9: 0

## 2025-03-19 NOTE — PROGRESS NOTES
Rasheed Dalal is a 74 y.o. male (: 1950) presenting to address:    Chief Complaint   Patient presents with    Diabetes       Vitals:    25 1451   BP: 118/80   Pulse: 74   Resp: 16   Temp: 98.2 °F (36.8 °C)   SpO2: 95%       \"Have you been to the ER, urgent care clinic since your last visit?  Hospitalized since your last visit?\"    NO    “Have you seen or consulted any other health care providers outside of [facility] since your last visit?”    NO           
Reactions    Glimepiride Dizziness or Vertigo     Patient reported that this medications made him very dizzy and light headed. It was discontinued by Dr Rodriguez       Social History     Tobacco Use   Smoking Status Former    Current packs/day: 0.00    Average packs/day: 1 pack/day for 30.0 years (30.0 ttl pk-yrs)    Types: Cigarettes    Start date:     Quit date:     Years since quittin.2   Smokeless Tobacco Never       Social History     Substance and Sexual Activity   Alcohol Use Yes       Immunization History   Administered Date(s) Administered    COVID-19, MODERNA BLUE border, Primary or Immunocompromised, (age 12y+), IM, 100 mcg/0.5mL 2021, 2021, 2021    COVID-19, PFIZER, , (age 12y+), IM, 30mcg/0.3mL 2024    Pneumococcal, PCV20, PREVNAR 20, (age 6w+), IM, 0.5mL 08/15/2022    TDaP, ADACEL (age 10y-64y), BOOSTRIX (age 10y+), IM, 0.5mL 2014       Patient Active Problem List   Diagnosis    Scrotal abscess    Sinus congestion    Stented coronary artery    Low serum testosterone level    Hyperlipidemia    Hiatal hernia    Gastric ulcer    History of Helicobacter pylori infection    Coccydynia    Heart murmur    Hypercholesterolemia    Diabetes mellitus (HCC)    Nocturia    Vitamin D deficiency    Controlled type 2 diabetes mellitus without complication, without long-term current use of insulin (HCC)    Umbilical hernia without obstruction and without gangrene    Lactose intolerance    Chest pain    Perineal abscess    Impotence of organic origin    History of adenomatous polyp of colon    Type 2 diabetes mellitus with hyperglycemia, without long-term current use of insulin (HCC)    Autoimmune thrombocytopenia (HCC)    Coronary artery disease involving native coronary artery of native heart without angina pectoris         Current Outpatient Medications:     Dulaglutide 3 MG/0.5ML SOAJ, Inject 3 mg into the skin once a week, Disp: 6 mL, Rfl: 3    simvastatin (ZOCOR) 10

## 2025-03-19 NOTE — PATIENT INSTRUCTIONS
savings of up to 75% at more than 50,000 national and Regional pharmacies.  Phone toll-free: 1-925.373.9186  Website: www.[employer]    Financial  [employer] ([employer])  What they offer: Provide support to [place] residents who are experiencing crises by assessing their needs, providing information and resources, and directly contributing financially  Website: [email or web address]  [place] residents can make an appointment by calling [facility] at [phone] or emailing [email or web address]    [employer]  What they offer: Rent, utility, food/meal and housing assistance.  Website: [email or web address]://[email or web address]/[email or web address]  Visit the website to find your local [employer]

## 2025-03-22 ASSESSMENT — ENCOUNTER SYMPTOMS
COUGH: 0
WHEEZING: 0
CHEST TIGHTNESS: 0
SHORTNESS OF BREATH: 0

## 2025-04-09 ENCOUNTER — TELEPHONE (OUTPATIENT)
Dept: FAMILY MEDICINE CLINIC | Facility: CLINIC | Age: 75
End: 2025-04-09

## 2025-04-09 DIAGNOSIS — E11.9 TYPE 2 DIABETES MELLITUS WITHOUT COMPLICATION, WITHOUT LONG-TERM CURRENT USE OF INSULIN: Primary | ICD-10-CM

## 2025-04-09 NOTE — TELEPHONE ENCOUNTER
PT called to get a refill on Trulicity. He stated he was informed to call when he was out so he can get the next dose because it is increasing

## 2025-06-09 NOTE — PROGRESS NOTES
HISTORY OF PRESENT ILLNESS  Rasheed Dalal  is a 74 y.o. y.o. male    He requests evaluation of  irritating moles on his left shoulder and right chest        Mr#: [id number]      Past Medical History:   Diagnosis Date    CAD (coronary artery disease)     Constipation     Diabetes mellitus (HCC) 2015    Diarrhea     Elevated PSA     Family history of malignant neoplasm of prostate     Gastric ulcer 2014    On EGD in .      Hiatal hernia 2014    On EGD, .  With Dr. Werner.    History of Helicobacter pylori infection 2014    On EGD in , Dr. Werner.  S/p Tx.    Hyperlipidemia     Impotence of organic origin     Lactose intolerance     Low serum testosterone level     Male erectile dysfunction, unspecified     Nocturia     Other testicular hypofunction     Scrotal abscess 2018    Sinus congestion     Stented coronary artery 2013.    Umbilical hernia without obstruction and without gangrene 2018    Vitamin D deficiency        Past Surgical History:   Procedure Laterality Date    COLONOSCOPY  2017    Tubular adenoma, 5-year follow-up, Dr. Werner    COLONOSCOPY  2022    7 polyps excised, pathology pending, , 3-year follow-up    CORONARY ANGIOPLASTY WITH STENT PLACEMENT  2013    GI  2018    groin abcess     HERNIA REPAIR  2003    inguinial, right       Family History   Problem Relation Age of Onset    Hypertension Mother     Diabetes Brother     Cancer Sister        Allergies   Allergen Reactions    Glimepiride Dizziness or Vertigo     Patient reported that this medications made him very dizzy and light headed. It was discontinued by Dr Rordiguez       Social History     Tobacco Use   Smoking Status Former    Current packs/day: 0.00    Average packs/day: 1 pack/day for 30.0 years (30.0 ttl pk-yrs)    Types: Cigarettes    Start date:     Quit date:     Years since quittin.4   Smokeless Tobacco Never       Social History     Substance

## 2025-06-10 ENCOUNTER — OFFICE VISIT (OUTPATIENT)
Dept: FAMILY MEDICINE CLINIC | Facility: CLINIC | Age: 75
End: 2025-06-10
Payer: COMMERCIAL

## 2025-06-10 VITALS
DIASTOLIC BLOOD PRESSURE: 70 MMHG | BODY MASS INDEX: 29.99 KG/M2 | RESPIRATION RATE: 16 BRPM | HEART RATE: 73 BPM | OXYGEN SATURATION: 95 % | TEMPERATURE: 98.2 F | SYSTOLIC BLOOD PRESSURE: 110 MMHG | HEIGHT: 65 IN | WEIGHT: 180 LBS

## 2025-06-10 DIAGNOSIS — L91.8 CUTANEOUS SKIN TAGS: Primary | ICD-10-CM

## 2025-06-10 PROCEDURE — 99213 OFFICE O/P EST LOW 20 MIN: CPT | Performed by: FAMILY MEDICINE

## 2025-06-10 PROCEDURE — 1123F ACP DISCUSS/DSCN MKR DOCD: CPT | Performed by: FAMILY MEDICINE

## 2025-06-10 ASSESSMENT — PATIENT HEALTH QUESTIONNAIRE - PHQ9
SUM OF ALL RESPONSES TO PHQ QUESTIONS 1-9: 0
2. FEELING DOWN, DEPRESSED OR HOPELESS: NOT AT ALL
1. LITTLE INTEREST OR PLEASURE IN DOING THINGS: NOT AT ALL
SUM OF ALL RESPONSES TO PHQ QUESTIONS 1-9: 0

## 2025-06-10 NOTE — PATIENT INSTRUCTIONS
Current Status:    Benign skin tags, left posterior shoulder disintegrating on its own, right underarm not bothersome to the patient    Plan:  Report back if these become bothersome and intervention is needed  Return for previously scheduled lab and annual physical exam appointments in September, return sooner with any problems  Please always arrive at least 15 minutes before your scheduled appointment time.

## 2025-06-10 NOTE — PROGRESS NOTES
Rasheed Dalal is a 74 y.o. male (: 1950) presenting to address:    Chief Complaint   Patient presents with    Skin Problem     Mole        Vitals:    06/10/25 1516   BP: 110/70   Pulse: 73   Resp: 16   Temp: 98.2 °F (36.8 °C)   SpO2: 95%       \"Have you been to the ER, urgent care clinic since your last visit?  Hospitalized since your last visit?\"    NO    “Have you seen or consulted any other health care providers outside of [facility] since your last visit?”    NO

## 2025-08-26 ENCOUNTER — OFFICE VISIT (OUTPATIENT)
Dept: FAMILY MEDICINE CLINIC | Facility: CLINIC | Age: 75
End: 2025-08-26
Payer: COMMERCIAL

## 2025-08-26 VITALS
TEMPERATURE: 98.2 F | WEIGHT: 177 LBS | HEIGHT: 65 IN | DIASTOLIC BLOOD PRESSURE: 74 MMHG | BODY MASS INDEX: 29.49 KG/M2 | OXYGEN SATURATION: 97 % | SYSTOLIC BLOOD PRESSURE: 120 MMHG | HEART RATE: 74 BPM | RESPIRATION RATE: 16 BRPM

## 2025-08-26 DIAGNOSIS — S41.102A ARM WOUND, LEFT, INITIAL ENCOUNTER: Primary | ICD-10-CM

## 2025-08-26 DIAGNOSIS — S50.852A FOREIGN BODY IN LEFT FOREARM, INITIAL ENCOUNTER: ICD-10-CM

## 2025-08-26 DIAGNOSIS — T07.XXXA MULTIPLE CONTUSIONS: ICD-10-CM

## 2025-08-26 PROCEDURE — 99214 OFFICE O/P EST MOD 30 MIN: CPT | Performed by: FAMILY MEDICINE

## 2025-08-26 PROCEDURE — 1123F ACP DISCUSS/DSCN MKR DOCD: CPT | Performed by: FAMILY MEDICINE

## 2025-08-26 ASSESSMENT — PATIENT HEALTH QUESTIONNAIRE - PHQ9
SUM OF ALL RESPONSES TO PHQ QUESTIONS 1-9: 1
2. FEELING DOWN, DEPRESSED OR HOPELESS: SEVERAL DAYS
1. LITTLE INTEREST OR PLEASURE IN DOING THINGS: NOT AT ALL
SUM OF ALL RESPONSES TO PHQ QUESTIONS 1-9: 1

## 2025-08-26 ASSESSMENT — ENCOUNTER SYMPTOMS
CHEST TIGHTNESS: 0
SHORTNESS OF BREATH: 0

## 2025-09-02 ENCOUNTER — OFFICE VISIT (OUTPATIENT)
Dept: FAMILY MEDICINE CLINIC | Facility: CLINIC | Age: 75
End: 2025-09-02
Payer: COMMERCIAL

## 2025-09-02 VITALS
DIASTOLIC BLOOD PRESSURE: 70 MMHG | TEMPERATURE: 98.3 F | HEIGHT: 65 IN | OXYGEN SATURATION: 97 % | BODY MASS INDEX: 29.16 KG/M2 | RESPIRATION RATE: 16 BRPM | WEIGHT: 175 LBS | SYSTOLIC BLOOD PRESSURE: 120 MMHG | HEART RATE: 73 BPM

## 2025-09-02 DIAGNOSIS — S41.102D ARM WOUND, LEFT, SUBSEQUENT ENCOUNTER: Primary | ICD-10-CM

## 2025-09-02 DIAGNOSIS — T07.XXXA MULTIPLE CONTUSIONS: ICD-10-CM

## 2025-09-02 DIAGNOSIS — S50.852D FOREIGN BODY IN LEFT FOREARM, SUBSEQUENT ENCOUNTER: ICD-10-CM

## 2025-09-02 PROCEDURE — 99213 OFFICE O/P EST LOW 20 MIN: CPT | Performed by: FAMILY MEDICINE

## 2025-09-02 PROCEDURE — 1123F ACP DISCUSS/DSCN MKR DOCD: CPT | Performed by: FAMILY MEDICINE

## 2025-09-05 DIAGNOSIS — D69.3 AUTOIMMUNE THROMBOCYTOPENIA (HCC): ICD-10-CM

## 2025-09-05 DIAGNOSIS — E11.9 TYPE 2 DIABETES MELLITUS WITHOUT COMPLICATION, WITHOUT LONG-TERM CURRENT USE OF INSULIN (HCC): Primary | ICD-10-CM

## 2025-09-05 DIAGNOSIS — E78.00 HYPERCHOLESTEROLEMIA: ICD-10-CM

## 2025-09-05 DIAGNOSIS — E55.9 VITAMIN D DEFICIENCY: ICD-10-CM
